# Patient Record
Sex: MALE | Race: WHITE | NOT HISPANIC OR LATINO
[De-identification: names, ages, dates, MRNs, and addresses within clinical notes are randomized per-mention and may not be internally consistent; named-entity substitution may affect disease eponyms.]

---

## 2017-01-03 ENCOUNTER — APPOINTMENT (OUTPATIENT)
Dept: GASTROENTEROLOGY | Facility: HOSPITAL | Age: 57
End: 2017-01-03

## 2020-07-17 ENCOUNTER — OUTPATIENT (OUTPATIENT)
Dept: OUTPATIENT SERVICES | Facility: HOSPITAL | Age: 60
LOS: 1 days | End: 2020-07-17

## 2020-07-17 VITALS
OXYGEN SATURATION: 100 % | HEART RATE: 65 BPM | RESPIRATION RATE: 16 BRPM | SYSTOLIC BLOOD PRESSURE: 161 MMHG | HEIGHT: 68 IN | TEMPERATURE: 98 F | DIASTOLIC BLOOD PRESSURE: 66 MMHG | WEIGHT: 207.9 LBS

## 2020-07-17 DIAGNOSIS — N43.3 HYDROCELE, UNSPECIFIED: ICD-10-CM

## 2020-07-17 DIAGNOSIS — I10 ESSENTIAL (PRIMARY) HYPERTENSION: ICD-10-CM

## 2020-07-17 DIAGNOSIS — N43.2 OTHER HYDROCELE: ICD-10-CM

## 2020-07-17 DIAGNOSIS — E11.9 TYPE 2 DIABETES MELLITUS WITHOUT COMPLICATIONS: ICD-10-CM

## 2020-07-17 RX ORDER — CHLORHEXIDINE GLUCONATE 213 G/1000ML
1 SOLUTION TOPICAL
Qty: 118 | Refills: 0
Start: 2020-07-17 | End: 2020-07-17

## 2020-07-17 NOTE — H&P PST ADULT - NEGATIVE OPHTHALMOLOGIC SYMPTOMS
no indicators present
no blurred vision R/no blurred vision L/no loss of vision L/no loss of vision R

## 2020-07-17 NOTE — H&P PST ADULT - NSICDXPASTMEDICALHX_GEN_ALL_CORE_FT
PAST MEDICAL HISTORY:  Diabetes mellitus     Gastritis     Hyperlipidemia     Hypertension PAST MEDICAL HISTORY:  Diabetes mellitus     Gastritis     Hyperlipidemia     Hypertension     NIKKI (obstructive sleep apnea)

## 2020-07-17 NOTE — H&P PST ADULT - PAIN SCALE PREFERRED, PROFILE
direct patient care (not related to procedure)/additional history taking/interpretation of diagnostic studies/documentation/consultation with other physicians/consult w/ pt's family directly relating to pts condition/telephone consultation with the patient's family
none

## 2020-07-17 NOTE — H&P PST ADULT - NSICDXPROBLEM_GEN_ALL_CORE_FT
PROBLEM DIAGNOSES  Problem: Hydrocele  Assessment and Plan: Preoperative instructions (see paper chart) given to patient with understanding verbalized via telephone with assist of [employer]  services ID# [id number] for scheduled left hydrocelectomy. Patient instructions to include picking up e-script rx for dynahex-4 soap from patient designated pharmacy and using it as instructed.    Problem: Diabetes mellitus  Assessment and Plan: Patient instructed via telephone with assist of [employer]  services ID# [id number] to skip hypoglycemics the morning of surgery. Patient verbalized understanding     Problem: Hypertension  Assessment and Plan: Patient instructed via telephone with assist of [employer]  services ID# [id number] to take Enalapril 10mg with a sip of water the morning of surgery. Patient verbalized understanding PROBLEM DIAGNOSES  Problem: NIKKI (obstructive sleep apnea)  Assessment and Plan: Perioperative nikki precautions to be maintained.     Problem: Hydrocele  Assessment and Plan: Preoperative instructions (see paper chart) given to patient with understanding verbalized via telephone with assist of [employer]  services ID# [id number] for scheduled left hydrocelectomy. Patient instructions to include picking up e-script rx for dynahex-4 soap from patient designated pharmacy and using it as instructed.    Problem: Diabetes mellitus  Assessment and Plan: Patient instructed via telephone with assist of [employer]  services ID# [id number] to skip hypoglycemics the morning of surgery. Patient verbalized understanding     Problem: Hypertension  Assessment and Plan: Patient instructed via telephone with assist of [employer]  services ID# [id number] to take Enalapril 10mg with a sip of water the morning of surgery. Patient verbalized understanding

## 2020-07-17 NOTE — H&P PST ADULT - NS PRO AD NO ADVANCE DIRECTIVE
Patient Education   Bronchitis  Otitis media aguda con infecciÃ³n (niÃ±o)    Richardson hijo tiene tacos infecciÃ³n del oÃ­do (otitis media aguda) causada por bacterias o un hongo. El oÃ­do medio es el espacio que se encuentra detrÃ¡s del [place]. La trompa de Josep conecta el oÃ­do con el pasaje nasal. Las trompas de Josep ayudan a drenar el lÃ­quido de [place]. TambiÃ©n mantienen el SEAT 4a [employer] presiÃ³n dentro de los oÃ­dos y fuera de los oÃ­dos. Estas trompas son mÃ¡s cortas y estÃ¡n ubicadas de manera mÃ¡s horizontal en los niÃ±os, por eso, es mÃ¡s probable que se bloqueen. Un bloqueo hace que se acumulen lÃ­quido y presiÃ³n en el oÃ­do medio. En mason lÃ­quido, pueden crecer bacterias u hongos y provocar tacos infecciÃ³n de oÃ­do. Esta infecciÃ³n suele conocerse murphy âdolor de oÃ­doâ. Richardson principal sÃ­ntoma es el dolor en el oÃ­do. Otros sÃ­ntomas pueden incluir tirarse de la oreja, estar mÃ¡s molesto que lo habitual, tener menos apetito, vÃ³glendy o diarrea. TambiÃ©n puede que richardson hijo tenga dificultades para oÃ­r carlos. Es posible que richardson hijo haya tenido jodi tacos infecciÃ³n respiratoria. Tacos infecciÃ³n de oÃ­do puede desaparecer por sÃ­ chavez. O puede que richardson hijo necesite sonia medicamentos. Eleazar Real que se vaya la infecciÃ³n, es posible que richardson hijo siga teniendo lÃ­quido en el oÃ­do medio, el cual puede tardar semanas o meses en desaparecer. Gemma tasha perÃ­odo, es posible que richardson hijo tenga tacos pÃ©rdida temporal de la audiciÃ³n, hodan el carlee de los sÃ­ntomas del dolor de oÃ­do deberÃ­an desaparecer. Cuidados en richardson casa  Siga estos consejos para cuidar de richardson hijo en richardson casa:  Â· El proveedor de atenciÃ³n mÃ©dica probablemente le recetarÃ¡ medicamentos para aliviar el dolor. TambiÃ©n es posible que le recete antibiÃ³ticos o antifÃºngicos para tratar la infecciÃ³n. Pueden ser medicamentos lÃ­quidos que el niÃ±o deberÃ¡ sonia por la boca. O puede que conner gotas para colocarle en los oÃ­dos.  Siga las instrucciones del proveedor al darle estos medicamentos a richardson hijo.  Â· Dado que las infecciones de oÃ­do pueden desaparecer por sÃ­ solas, es posible que el proveedor sugiera esperar algunos dÃ­as antes de darle medicamentos para la infecciÃ³n a richardson hijo. Â· Para aliviar el dolor, rody que richardson hijo descanse sentado en posiciÃ³n recta. Puede colocarle compresas calientes o frÃ­as contra la oreja para ayudar a calmar el dolor. Â· Mantenga el oÃ­do [address] que richardson hijo use tacos gorra de baÃ±o al ducharse o baÃ±arse. Â· Evite fumar cerca de richardson hijo, ya que el humo de segunda mano aumenta los riesgos de tener infecciones de [employer]. Â· AsegÃºrese de que richardson hijo reciba todas las vacunas que corresponda. Â· Cuando le dÃ© el biberÃ³n, richardson bebÃ© no debe estar [place]. (Esta posiciÃ³n puede causar infecciÃ³n del oÃ­do medio porque permite que la [place] pase hacia las trompas de [place]). Â· Si estÃ¡ amamantando a richardson bebÃ©, siga haciÃ©ndolo hasta que richardson hijo tenga entre seis y [address] [facility]. Para aplicar las gotas en los oÃ­dos:  1. Coloque el frasco en [place] si guarda el medicamento en el refrigerador. Las gotas frÃ­as en el oÃ­do causan molestias. 2. Rody que el niÃ±o se acueste sobre tacos superficie plana. Suavemente, sostenga la delvin del niÃ±o hacia un lado. 3. Quite toda secreciÃ³n que pudiese tener el oÃ­do con un paÃ±uelo de papel o un hisopo de algodÃ³n limpios. Limpie solo el oÃ­do externo. No coloque el hisopo de algodÃ³n dentro del canal auditivo. 4. Con suavidad, tire del lÃ³bulo de la oreja hacia arriba y [place] atrÃ¡s para enderezar el canal auditivo. 5. Sostenga el gotero a alrededor de un centÃ­metro del canal auditivo para evitar que el gotero se contamine. [address] contra el costado del canal auditivo. 6. Rody que richardson hijo se quede [facility] o radha minutos para que el medicamento pueda entrar en el canal auditivo. Si richardson hijo no tiene dolor, masajÃ©tomasa suavemente el oÃ­do externo, cerca de la abertura.   7. Limpie el exceso de medicamento del oÃ­do externo con Kevin Rily jose de algodÃ³n limpia. Visitas de control  Programe tacos visita de control con el proveedor de atenciÃ³n mÃ©dica de richardson hijo o segÃºn se le haya indicado. Richardson hijo necesitarÃ¡ que vuelvan a revisarle el oÃ­do para asegurarse de que la infecciÃ³n se ha resuelto. Consulte a richardson mÃ©dico para saber cuÃ¡ndo querrÃ­a volver a tangela a richardson hijo. Nota especial para los padres  Si richardson hijo sigue teniendo dolor de oÃ­do, puede que deban colocarle tubos en los oÃ­dos. El proveedor le colocarÃ¡ pequeÃ±os tubos en el tÃ­mpano de richardson hijo para ayudar a impedir que el lÃ­quido siga acumulÃ¡ndose. [place] procedimiento es simple y [place] CuÃ¡ndo debe buscar atenciÃ³n mÃ©dica  A menos que el proveedor de atenciÃ³n mÃ©dica de richardson hijo le haya indicado otra cosa, llame enseguida al proveedor de atenciÃ³n mÃ©dica de richardson hijo si el niÃ±o presenta cualquiera de los siguientes sÃ­ntomas:  Â· Richardson hijo tiene radha meses de edad o menos y tiene tacos temperatura de 100.4Âº F (38Âº C) o mÃ¡s. (Busque atenciÃ³n mÃ©dica de inmediato. La fiebre en un bebÃ© pequeÃ±o puede significar tacos infecciÃ³n peligrosa). Â· Richardson hijo tiene menos de [address] y richardson fiebre de 100.4Â° F (38Â° C) continÃºa por mÃ¡s de un dÃ­a. Â· Richardson hijo tiene [address] o mÃ¡s y tiene fiebre de 100.4Âº F (38Âº C) que continÃºa por mÃ¡s de radha dÃ­as. Â· Richardson hijo, de cualquier edad, tiene fiebre a repeticiÃ³n por encima de los 104Â° F (40Â° C). TambiÃ©n llame inmediatamente al proveedor de atenciÃ³n mÃ©dica de richardson hijo si se presenta cualquiera de las siguientes situaciones:  Â· SÃ­ntomas nuevos, especialmente, inflamaciÃ³n alrededor de la oreja o debilidad en los mÃºsculos de la tg. Â· Dolor muy brian. Â· La infecciÃ³n parece empeorar en lugar de mejorar. Â· Dolor en el alli. Â· Richardson hijo se ve muy enfermo (no actÃºa murphy siempre). Â· Fiebre o dolor que no mejora con antibiÃ³ticos despuÃ©s de 48 horas. Â© [address] The [address] [place] [address] [address] Todos los derechos reservados.  Esta informaciÃ³n no pretende sustituir la atenciÃ³n mÃ©dica profesional. SÃ³lo richardson mÃ©dico puede diagnosticar y tratar un problema de arvind. No

## 2020-07-17 NOTE — H&P PST ADULT - HISTORY OF PRESENT ILLNESS
59year old male with pmhx of Diabetes Mellitus Type 2, gastritis, hypertension and hyperlipidemia communicated to via telephone with assist of [employer]  services ID#[id number] Cristobal for purpose of preoperative assessment/instructions for schedule left hydrocelectomy on 7/21/2020 59year old male with pmhx of Diabetes Mellitus Type 2, gastritis, hypertension, NIKKI and hyperlipidemia communicated to via telephone with assist of [employer]  services ID#[id number] Cristobal for purpose of preoperative assessment/instructions for schedule left hydrocelectomy on 7/21/2020

## 2020-07-17 NOTE — H&P PST ADULT - NEGATIVE RESPIRATORY AND THORAX SYMPTOMS
[Good] : being in good health [Regular Exercise] : regular exercise [Healthy Diet] : a healthy diet [Last Pap Smear ___] : last Papanicolaou cytology done [unfilled] [Last Mammogram ___] : last mammogram done [unfilled] [Postmenopausal] : is postmenopausal [Colonoscopy Within 10 Years] : a colonoscopy within the past ten years [Menarche Age: ____] : age at menarche was [unfilled] [Dyspareunia] : dyspareunia [Post-Menopause, No Sxs] : post-menopausal, currently without symptoms [unknown] : the patient is unsure of the date of her LMP [Sexually Active] : is sexually active [Monogamous] : is monogamous [Male ___] : [unfilled] male [Burning] : no burning [Itching] : no itching [Mass] : no mass [Lesion] : no lesion [Stinging] : no stinging [Soreness] : no soreness [Discharge] : no discharge [Localized Pain] : no localized pain [Mass (___cm)] : no palpable mass [Diffused Pain] : no diffused pain [Nipple Discharge] : no nipple discharge [Skin Color Change] : no skin color change [Hot Flashes] : no hot flashes [Night Sweats] : no night sweats [Vaginal Itching] : no vaginal itching [Mood Changes] : no mood changes no wheezing/no dyspnea/no hemoptysis/no pleuritic chest pain/no cough

## 2020-07-17 NOTE — H&P PST ADULT - RX
CPAP machine was prescribed but  patient states he declined CPAP machine was prescribed but patient states he declined

## 2020-07-18 ENCOUNTER — APPOINTMENT (OUTPATIENT)
Dept: DISASTER EMERGENCY | Facility: CLINIC | Age: 60
End: 2020-07-18

## 2020-07-18 DIAGNOSIS — Z01.818 ENCOUNTER FOR OTHER PREPROCEDURAL EXAMINATION: ICD-10-CM

## 2020-07-19 LAB — SARS-COV-2 N GENE NPH QL NAA+PROBE: NOT DETECTED

## 2020-07-20 ENCOUNTER — TRANSCRIPTION ENCOUNTER (OUTPATIENT)
Age: 60
End: 2020-07-20

## 2020-07-21 ENCOUNTER — RESULT REVIEW (OUTPATIENT)
Age: 60
End: 2020-07-21

## 2020-07-21 ENCOUNTER — OUTPATIENT (OUTPATIENT)
Dept: OUTPATIENT SERVICES | Facility: HOSPITAL | Age: 60
LOS: 1 days | End: 2020-07-21
Payer: COMMERCIAL

## 2020-07-21 VITALS
TEMPERATURE: 98 F | RESPIRATION RATE: 16 BRPM | OXYGEN SATURATION: 100 % | DIASTOLIC BLOOD PRESSURE: 66 MMHG | SYSTOLIC BLOOD PRESSURE: 161 MMHG | HEART RATE: 65 BPM

## 2020-07-21 VITALS
HEART RATE: 62 BPM | OXYGEN SATURATION: 100 % | SYSTOLIC BLOOD PRESSURE: 152 MMHG | TEMPERATURE: 99 F | RESPIRATION RATE: 15 BRPM

## 2020-07-21 DIAGNOSIS — N43.3 HYDROCELE, UNSPECIFIED: ICD-10-CM

## 2020-07-21 DIAGNOSIS — G47.33 OBSTRUCTIVE SLEEP APNEA (ADULT) (PEDIATRIC): ICD-10-CM

## 2020-07-21 LAB
GLUCOSE BLDC GLUCOMTR-MCNC: 163 MG/DL — HIGH (ref 70–99)
GLUCOSE BLDC GLUCOMTR-MCNC: 216 MG/DL — HIGH (ref 70–99)

## 2020-07-21 PROCEDURE — 55040 REMOVAL OF HYDROCELE: CPT

## 2020-07-21 PROCEDURE — 82962 GLUCOSE BLOOD TEST: CPT

## 2020-07-21 PROCEDURE — 88302 TISSUE EXAM BY PATHOLOGIST: CPT

## 2020-07-21 PROCEDURE — 55500 REMOVAL OF HYDROCELE: CPT | Mod: AS

## 2020-07-21 PROCEDURE — 88302 TISSUE EXAM BY PATHOLOGIST: CPT | Mod: 26

## 2020-07-21 RX ORDER — CIPROFLOXACIN LACTATE 400MG/40ML
1 VIAL (ML) INTRAVENOUS
Qty: 6 | Refills: 0
Start: 2020-07-21 | End: 2020-07-23

## 2020-07-21 RX ORDER — ACETAMINOPHEN 500 MG
1000 TABLET ORAL ONCE
Refills: 0 | Status: DISCONTINUED | OUTPATIENT
Start: 2020-07-21 | End: 2020-07-29

## 2020-07-21 RX ORDER — SODIUM CHLORIDE 9 MG/ML
3 INJECTION INTRAMUSCULAR; INTRAVENOUS; SUBCUTANEOUS EVERY 8 HOURS
Refills: 0 | Status: DISCONTINUED | OUTPATIENT
Start: 2020-07-21 | End: 2020-07-21

## 2020-07-21 NOTE — ASU DISCHARGE PLAN (ADULT/PEDIATRIC) - CARE PROVIDER_API CALL
Stiven Mcduffie  UROLOGY  [address] [place] [address]  Phone: [phone]  Fax: [phone]  Follow Up Time:

## 2020-07-21 NOTE — ASU PATIENT PROFILE, ADULT - NSALCOHOLTYPE_GEN__A_CORE_SD
Group Topic: BH Check-in/Symptom Rating    Date: 3/12/2020  Start Time: 0830  End Time: 0900  Facilitators: Brianna Theodore LPC    Focus: Symptoms and Goals  Number in attendance: 7    The purpose was for pt to assess current symptoms and set a goal for the day.    Pt did not attend group.    Brianna Theodore MS, LPC-IT       liquor/only during holidays

## 2020-07-21 NOTE — ASU DISCHARGE PLAN (ADULT/PEDIATRIC) - PAIN MANAGEMENT
Prescriptions electronically submitted to pharmacy from [place]/Take over the counter pain medication

## 2020-07-21 NOTE — ASU DISCHARGE PLAN (ADULT/PEDIATRIC) - ASU DC SPECIAL INSTRUCTIONSFT
Please follow-up with Dr. Mcduffie on Thursday 7/23/20 to remove your penrose drain. Please take your prescribed antibiotics as directed. You may take over the counter Tylenol or Motrin for your pain in addition to your prescribed narcotic pain medication.

## 2020-11-02 NOTE — ASU PREOP CHECKLIST - DNR CLARIFICATION FORM COMPLETED

## 2020-11-25 ENCOUNTER — INPATIENT (INPATIENT)
Facility: HOSPITAL | Age: 60
LOS: 7 days | Discharge: HOME | DRG: 418 | End: 2020-12-03
Attending: SPECIALIST | Admitting: SPECIALIST
Payer: COMMERCIAL

## 2020-11-25 VITALS
WEIGHT: 210.1 LBS | SYSTOLIC BLOOD PRESSURE: 176 MMHG | RESPIRATION RATE: 16 BRPM | DIASTOLIC BLOOD PRESSURE: 76 MMHG | OXYGEN SATURATION: 97 % | HEART RATE: 78 BPM | TEMPERATURE: 99 F | HEIGHT: 68 IN

## 2020-11-25 DIAGNOSIS — E11.9 TYPE 2 DIABETES MELLITUS WITHOUT COMPLICATIONS: ICD-10-CM

## 2020-11-25 DIAGNOSIS — I10 ESSENTIAL (PRIMARY) HYPERTENSION: ICD-10-CM

## 2020-11-25 DIAGNOSIS — Z29.9 ENCOUNTER FOR PROPHYLACTIC MEASURES, UNSPECIFIED: ICD-10-CM

## 2020-11-25 DIAGNOSIS — K85.90 ACUTE PANCREATITIS WITHOUT NECROSIS OR INFECTION, UNSPECIFIED: ICD-10-CM

## 2020-11-25 DIAGNOSIS — E78.5 HYPERLIPIDEMIA, UNSPECIFIED: ICD-10-CM

## 2020-11-25 DIAGNOSIS — N28.1 CYST OF KIDNEY, ACQUIRED: ICD-10-CM

## 2020-11-25 DIAGNOSIS — D72.829 ELEVATED WHITE BLOOD CELL COUNT, UNSPECIFIED: ICD-10-CM

## 2020-11-25 PROBLEM — G47.33 OBSTRUCTIVE SLEEP APNEA (ADULT) (PEDIATRIC): Chronic | Status: ACTIVE | Noted: 2020-07-21

## 2020-11-25 PROBLEM — K29.70 GASTRITIS, UNSPECIFIED, WITHOUT BLEEDING: Chronic | Status: ACTIVE | Noted: 2020-07-17

## 2020-11-25 LAB
24R-OH-CALCIDIOL SERPL-MCNC: 39.4 NG/ML — SIGNIFICANT CHANGE UP (ref 30–80)
A1C WITH ESTIMATED AVERAGE GLUCOSE RESULT: 9.2 % — HIGH (ref 4–5.6)
ALBUMIN SERPL ELPH-MCNC: 3.5 G/DL — SIGNIFICANT CHANGE UP (ref 3.5–5)
ALBUMIN SERPL ELPH-MCNC: 3.5 G/DL — SIGNIFICANT CHANGE UP (ref 3.5–5)
ALP SERPL-CCNC: 67 U/L — SIGNIFICANT CHANGE UP (ref 40–120)
ALP SERPL-CCNC: 68 U/L — SIGNIFICANT CHANGE UP (ref 40–120)
ALT FLD-CCNC: 19 U/L DA — SIGNIFICANT CHANGE UP (ref 10–60)
ALT FLD-CCNC: 19 U/L DA — SIGNIFICANT CHANGE UP (ref 10–60)
AMMONIA BLD-MCNC: 26 UMOL/L — SIGNIFICANT CHANGE UP (ref 11–32)
AMPHET UR-MCNC: NEGATIVE — SIGNIFICANT CHANGE UP
AMYLASE P1 CFR SERPL: 529 U/L — HIGH (ref 25–115)
ANION GAP SERPL CALC-SCNC: 14 MMOL/L — SIGNIFICANT CHANGE UP (ref 5–17)
ANION GAP SERPL CALC-SCNC: 15 MMOL/L — SIGNIFICANT CHANGE UP (ref 5–17)
APPEARANCE UR: CLEAR — SIGNIFICANT CHANGE UP
AST SERPL-CCNC: 17 U/L — SIGNIFICANT CHANGE UP (ref 10–40)
AST SERPL-CCNC: 18 U/L — SIGNIFICANT CHANGE UP (ref 10–40)
BARBITURATES UR SCN-MCNC: NEGATIVE — SIGNIFICANT CHANGE UP
BASOPHILS # BLD AUTO: 0.03 K/UL — SIGNIFICANT CHANGE UP (ref 0–0.2)
BASOPHILS # BLD AUTO: 0.03 K/UL — SIGNIFICANT CHANGE UP (ref 0–0.2)
BASOPHILS NFR BLD AUTO: 0.1 % — SIGNIFICANT CHANGE UP (ref 0–2)
BASOPHILS NFR BLD AUTO: 0.2 % — SIGNIFICANT CHANGE UP (ref 0–2)
BENZODIAZ UR-MCNC: NEGATIVE — SIGNIFICANT CHANGE UP
BILIRUB SERPL-MCNC: 0.7 MG/DL — SIGNIFICANT CHANGE UP (ref 0.2–1.2)
BILIRUB SERPL-MCNC: 0.7 MG/DL — SIGNIFICANT CHANGE UP (ref 0.2–1.2)
BILIRUB UR-MCNC: NEGATIVE — SIGNIFICANT CHANGE UP
BUN SERPL-MCNC: 25 MG/DL — HIGH (ref 7–18)
BUN SERPL-MCNC: 30 MG/DL — HIGH (ref 7–18)
CA-I BLD-SCNC: 1.25 MMOL/L — SIGNIFICANT CHANGE UP (ref 1.12–1.3)
CALCIUM SERPL-MCNC: 8.5 MG/DL — SIGNIFICANT CHANGE UP (ref 8.4–10.5)
CALCIUM SERPL-MCNC: 8.9 MG/DL — SIGNIFICANT CHANGE UP (ref 8.4–10.5)
CHLORIDE SERPL-SCNC: 100 MMOL/L — SIGNIFICANT CHANGE UP (ref 96–108)
CHLORIDE SERPL-SCNC: 106 MMOL/L — SIGNIFICANT CHANGE UP (ref 96–108)
CHOLEST SERPL-MCNC: 139 MG/DL — SIGNIFICANT CHANGE UP
CO2 SERPL-SCNC: 20 MMOL/L — LOW (ref 22–31)
CO2 SERPL-SCNC: 21 MMOL/L — LOW (ref 22–31)
COCAINE METAB.OTHER UR-MCNC: NEGATIVE — SIGNIFICANT CHANGE UP
COLOR SPEC: YELLOW — SIGNIFICANT CHANGE UP
CREAT SERPL-MCNC: 0.99 MG/DL — SIGNIFICANT CHANGE UP (ref 0.5–1.3)
CREAT SERPL-MCNC: 1.11 MG/DL — SIGNIFICANT CHANGE UP (ref 0.5–1.3)
CRP SERPL-MCNC: 4.68 MG/DL — HIGH (ref 0–0.4)
DIFF PNL FLD: NEGATIVE — SIGNIFICANT CHANGE UP
EOSINOPHIL # BLD AUTO: 0 K/UL — SIGNIFICANT CHANGE UP (ref 0–0.5)
EOSINOPHIL # BLD AUTO: 0.2 K/UL — SIGNIFICANT CHANGE UP (ref 0–0.5)
EOSINOPHIL NFR BLD AUTO: 0 % — SIGNIFICANT CHANGE UP (ref 0–6)
EOSINOPHIL NFR BLD AUTO: 1.1 % — SIGNIFICANT CHANGE UP (ref 0–6)
ERYTHROCYTE [SEDIMENTATION RATE] IN BLOOD: 6 MM/HR — SIGNIFICANT CHANGE UP (ref 0–20)
ESTIMATED AVERAGE GLUCOSE: 217 MG/DL — HIGH (ref 68–114)
ETHANOL SERPL-MCNC: 5 MG/DL — SIGNIFICANT CHANGE UP (ref 0–10)
ETHANOL SERPL-MCNC: <3 MG/DL — SIGNIFICANT CHANGE UP (ref 0–10)
FERRITIN SERPL-MCNC: 197 NG/ML — SIGNIFICANT CHANGE UP (ref 30–400)
FOLATE SERPL-MCNC: 15.1 NG/ML — SIGNIFICANT CHANGE UP
GGT SERPL-CCNC: 17 U/L — SIGNIFICANT CHANGE UP (ref 9–50)
GLUCOSE BLDC GLUCOMTR-MCNC: 148 MG/DL — HIGH (ref 70–99)
GLUCOSE BLDC GLUCOMTR-MCNC: 202 MG/DL — HIGH (ref 70–99)
GLUCOSE BLDC GLUCOMTR-MCNC: 217 MG/DL — HIGH (ref 70–99)
GLUCOSE BLDC GLUCOMTR-MCNC: 224 MG/DL — HIGH (ref 70–99)
GLUCOSE SERPL-MCNC: 235 MG/DL — HIGH (ref 70–99)
GLUCOSE SERPL-MCNC: 339 MG/DL — HIGH (ref 70–99)
GLUCOSE UR QL: 1000 MG/DL
HCT VFR BLD CALC: 50.8 % — HIGH (ref 39–50)
HCT VFR BLD CALC: 51.8 % — HIGH (ref 39–50)
HDLC SERPL-MCNC: 48 MG/DL — SIGNIFICANT CHANGE UP
HGB BLD-MCNC: 16.1 G/DL — SIGNIFICANT CHANGE UP (ref 13–17)
HGB BLD-MCNC: 16.7 G/DL — SIGNIFICANT CHANGE UP (ref 13–17)
IMM GRANULOCYTES NFR BLD AUTO: 0.3 % — SIGNIFICANT CHANGE UP (ref 0–1.5)
IMM GRANULOCYTES NFR BLD AUTO: 0.5 % — SIGNIFICANT CHANGE UP (ref 0–1.5)
KETONES UR-MCNC: ABNORMAL
LDH SERPL L TO P-CCNC: 215 U/L — SIGNIFICANT CHANGE UP (ref 120–225)
LEUKOCYTE ESTERASE UR-ACNC: NEGATIVE — SIGNIFICANT CHANGE UP
LIDOCAIN IGE QN: 6435 U/L — HIGH (ref 73–393)
LIDOCAIN IGE QN: HIGH U/L (ref 73–393)
LIPID PNL WITH DIRECT LDL SERPL: 77 MG/DL — SIGNIFICANT CHANGE UP
LYMPHOCYTES # BLD AUTO: 0.74 K/UL — LOW (ref 1–3.3)
LYMPHOCYTES # BLD AUTO: 0.86 K/UL — LOW (ref 1–3.3)
LYMPHOCYTES # BLD AUTO: 3.7 % — LOW (ref 13–44)
LYMPHOCYTES # BLD AUTO: 4.9 % — LOW (ref 13–44)
MAGNESIUM SERPL-MCNC: 2.3 MG/DL — SIGNIFICANT CHANGE UP (ref 1.6–2.6)
MCHC RBC-ENTMCNC: 28 PG — SIGNIFICANT CHANGE UP (ref 27–34)
MCHC RBC-ENTMCNC: 28.6 PG — SIGNIFICANT CHANGE UP (ref 27–34)
MCHC RBC-ENTMCNC: 31.7 GM/DL — LOW (ref 32–36)
MCHC RBC-ENTMCNC: 32.2 GM/DL — SIGNIFICANT CHANGE UP (ref 32–36)
MCV RBC AUTO: 88.3 FL — SIGNIFICANT CHANGE UP (ref 80–100)
MCV RBC AUTO: 88.9 FL — SIGNIFICANT CHANGE UP (ref 80–100)
METHADONE UR-MCNC: NEGATIVE — SIGNIFICANT CHANGE UP
MONOCYTES # BLD AUTO: 0.65 K/UL — SIGNIFICANT CHANGE UP (ref 0–0.9)
MONOCYTES # BLD AUTO: 1.33 K/UL — HIGH (ref 0–0.9)
MONOCYTES NFR BLD AUTO: 3.7 % — SIGNIFICANT CHANGE UP (ref 2–14)
MONOCYTES NFR BLD AUTO: 6.6 % — SIGNIFICANT CHANGE UP (ref 2–14)
NEUTROPHILS # BLD AUTO: 15.6 K/UL — HIGH (ref 1.8–7.4)
NEUTROPHILS # BLD AUTO: 18.04 K/UL — HIGH (ref 1.8–7.4)
NEUTROPHILS NFR BLD AUTO: 89.3 % — HIGH (ref 43–77)
NEUTROPHILS NFR BLD AUTO: 89.6 % — HIGH (ref 43–77)
NITRITE UR-MCNC: NEGATIVE — SIGNIFICANT CHANGE UP
NON HDL CHOLESTEROL: 91 MG/DL — SIGNIFICANT CHANGE UP
NRBC # BLD: 0 /100 WBCS — SIGNIFICANT CHANGE UP (ref 0–0)
NRBC # BLD: 0 /100 WBCS — SIGNIFICANT CHANGE UP (ref 0–0)
OPIATES UR-MCNC: POSITIVE
PCP SPEC-MCNC: SIGNIFICANT CHANGE UP
PCP UR-MCNC: NEGATIVE — SIGNIFICANT CHANGE UP
PH UR: 5 — SIGNIFICANT CHANGE UP (ref 5–8)
PHOSPHATE SERPL-MCNC: 4 MG/DL — SIGNIFICANT CHANGE UP (ref 2.5–4.5)
PLATELET # BLD AUTO: 224 K/UL — SIGNIFICANT CHANGE UP (ref 150–400)
PLATELET # BLD AUTO: 241 K/UL — SIGNIFICANT CHANGE UP (ref 150–400)
POTASSIUM SERPL-MCNC: 4.2 MMOL/L — SIGNIFICANT CHANGE UP (ref 3.5–5.3)
POTASSIUM SERPL-MCNC: 4.5 MMOL/L — SIGNIFICANT CHANGE UP (ref 3.5–5.3)
POTASSIUM SERPL-SCNC: 4.2 MMOL/L — SIGNIFICANT CHANGE UP (ref 3.5–5.3)
POTASSIUM SERPL-SCNC: 4.5 MMOL/L — SIGNIFICANT CHANGE UP (ref 3.5–5.3)
PROCALCITONIN SERPL-MCNC: 0.27 NG/ML — HIGH (ref 0.02–0.1)
PROT SERPL-MCNC: 7.2 G/DL — SIGNIFICANT CHANGE UP (ref 6–8.3)
PROT SERPL-MCNC: 7.4 G/DL — SIGNIFICANT CHANGE UP (ref 6–8.3)
PROT UR-MCNC: NEGATIVE — SIGNIFICANT CHANGE UP
RBC # BLD: 5.75 M/UL — SIGNIFICANT CHANGE UP (ref 4.2–5.8)
RBC # BLD: 5.83 M/UL — HIGH (ref 4.2–5.8)
RBC # FLD: 13.4 % — SIGNIFICANT CHANGE UP (ref 10.3–14.5)
RBC # FLD: 13.7 % — SIGNIFICANT CHANGE UP (ref 10.3–14.5)
SARS-COV-2 RNA SPEC QL NAA+PROBE: SIGNIFICANT CHANGE UP
SODIUM SERPL-SCNC: 136 MMOL/L — SIGNIFICANT CHANGE UP (ref 135–145)
SODIUM SERPL-SCNC: 140 MMOL/L — SIGNIFICANT CHANGE UP (ref 135–145)
SP GR SPEC: 1.01 — SIGNIFICANT CHANGE UP (ref 1.01–1.02)
THC UR QL: NEGATIVE — SIGNIFICANT CHANGE UP
TRIGL SERPL-MCNC: 71 MG/DL — SIGNIFICANT CHANGE UP
TRIGL SERPL-MCNC: 95 MG/DL — SIGNIFICANT CHANGE UP
TROPONIN I SERPL-MCNC: <0.015 NG/ML — SIGNIFICANT CHANGE UP (ref 0–0.04)
TSH SERPL-MCNC: 0.82 UU/ML — SIGNIFICANT CHANGE UP (ref 0.34–4.82)
URATE SERPL-MCNC: 6.1 MG/DL — SIGNIFICANT CHANGE UP (ref 3.4–8.8)
UROBILINOGEN FLD QL: NEGATIVE — SIGNIFICANT CHANGE UP
VIT B12 SERPL-MCNC: 704 PG/ML — SIGNIFICANT CHANGE UP (ref 232–1245)
WBC # BLD: 17.43 K/UL — HIGH (ref 3.8–10.5)
WBC # BLD: 20.21 K/UL — HIGH (ref 3.8–10.5)
WBC # FLD AUTO: 17.43 K/UL — HIGH (ref 3.8–10.5)
WBC # FLD AUTO: 20.21 K/UL — HIGH (ref 3.8–10.5)

## 2020-11-25 PROCEDURE — 71045 X-RAY EXAM CHEST 1 VIEW: CPT | Mod: 26

## 2020-11-25 PROCEDURE — 99222 1ST HOSP IP/OBS MODERATE 55: CPT

## 2020-11-25 PROCEDURE — 76705 ECHO EXAM OF ABDOMEN: CPT | Mod: 26

## 2020-11-25 PROCEDURE — 74177 CT ABD & PELVIS W/CONTRAST: CPT | Mod: 26

## 2020-11-25 PROCEDURE — 99285 EMERGENCY DEPT VISIT HI MDM: CPT

## 2020-11-25 PROCEDURE — 47563 LAPARO CHOLECYSTECTOMY/GRAPH: CPT | Mod: AS

## 2020-11-25 RX ORDER — GABAPENTIN 400 MG/1
100 CAPSULE ORAL THREE TIMES A DAY
Refills: 0 | Status: DISCONTINUED | OUTPATIENT
Start: 2020-11-25 | End: 2020-12-02

## 2020-11-25 RX ORDER — GABAPENTIN 400 MG/1
0 CAPSULE ORAL
Qty: 0 | Refills: 0 | DISCHARGE

## 2020-11-25 RX ORDER — MORPHINE SULFATE 50 MG/1
4 CAPSULE, EXTENDED RELEASE ORAL ONCE
Refills: 0 | Status: DISCONTINUED | OUTPATIENT
Start: 2020-11-25 | End: 2020-11-25

## 2020-11-25 RX ORDER — ROSUVASTATIN CALCIUM 5 MG/1
1 TABLET ORAL
Qty: 0 | Refills: 0 | DISCHARGE

## 2020-11-25 RX ORDER — GLUCAGON INJECTION, SOLUTION 0.5 MG/.1ML
1 INJECTION, SOLUTION SUBCUTANEOUS ONCE
Refills: 0 | Status: DISCONTINUED | OUTPATIENT
Start: 2020-11-25 | End: 2020-12-02

## 2020-11-25 RX ORDER — DESOXIMETASONE 0.05 %
0 CREAM (GRAM) TOPICAL
Qty: 0 | Refills: 4 | DISCHARGE

## 2020-11-25 RX ORDER — ATORVASTATIN CALCIUM 80 MG/1
20 TABLET, FILM COATED ORAL AT BEDTIME
Refills: 0 | Status: DISCONTINUED | OUTPATIENT
Start: 2020-11-25 | End: 2020-12-02

## 2020-11-25 RX ORDER — ONDANSETRON 8 MG/1
4 TABLET, FILM COATED ORAL EVERY 4 HOURS
Refills: 0 | Status: DISCONTINUED | OUTPATIENT
Start: 2020-11-25 | End: 2020-12-02

## 2020-11-25 RX ORDER — DAPAGLIFLOZIN 10 MG/1
0 TABLET, FILM COATED ORAL
Qty: 0 | Refills: 2 | DISCHARGE

## 2020-11-25 RX ORDER — MORPHINE SULFATE 50 MG/1
1 CAPSULE, EXTENDED RELEASE ORAL EVERY 4 HOURS
Refills: 0 | Status: DISCONTINUED | OUTPATIENT
Start: 2020-11-25 | End: 2020-11-30

## 2020-11-25 RX ORDER — ROSUVASTATIN CALCIUM 5 MG/1
0 TABLET ORAL
Qty: 0 | Refills: 3 | DISCHARGE

## 2020-11-25 RX ORDER — INSULIN LISPRO 100/ML
VIAL (ML) SUBCUTANEOUS EVERY 6 HOURS
Refills: 0 | Status: DISCONTINUED | OUTPATIENT
Start: 2020-11-25 | End: 2020-11-29

## 2020-11-25 RX ORDER — SODIUM CHLORIDE 9 MG/ML
1000 INJECTION, SOLUTION INTRAVENOUS
Refills: 0 | Status: DISCONTINUED | OUTPATIENT
Start: 2020-11-25 | End: 2020-11-26

## 2020-11-25 RX ORDER — PANTOPRAZOLE SODIUM 20 MG/1
40 TABLET, DELAYED RELEASE ORAL DAILY
Refills: 0 | Status: DISCONTINUED | OUTPATIENT
Start: 2020-11-25 | End: 2020-12-02

## 2020-11-25 RX ORDER — SODIUM CHLORIDE 9 MG/ML
1000 INJECTION INTRAMUSCULAR; INTRAVENOUS; SUBCUTANEOUS ONCE
Refills: 0 | Status: COMPLETED | OUTPATIENT
Start: 2020-11-25 | End: 2020-11-25

## 2020-11-25 RX ORDER — DEXTROSE 50 % IN WATER 50 %
15 SYRINGE (ML) INTRAVENOUS ONCE
Refills: 0 | Status: DISCONTINUED | OUTPATIENT
Start: 2020-11-25 | End: 2020-12-02

## 2020-11-25 RX ORDER — LIRAGLUTIDE 6 MG/ML
0 INJECTION SUBCUTANEOUS
Qty: 0 | Refills: 0 | DISCHARGE

## 2020-11-25 RX ORDER — ONDANSETRON 8 MG/1
4 TABLET, FILM COATED ORAL ONCE
Refills: 0 | Status: COMPLETED | OUTPATIENT
Start: 2020-11-25 | End: 2020-11-25

## 2020-11-25 RX ORDER — PIPERACILLIN AND TAZOBACTAM 4; .5 G/20ML; G/20ML
3.38 INJECTION, POWDER, LYOPHILIZED, FOR SOLUTION INTRAVENOUS ONCE
Refills: 0 | Status: COMPLETED | OUTPATIENT
Start: 2020-11-25 | End: 2020-11-25

## 2020-11-25 RX ORDER — HYDRALAZINE HCL 50 MG
5 TABLET ORAL EVERY 4 HOURS
Refills: 0 | Status: DISCONTINUED | OUTPATIENT
Start: 2020-11-25 | End: 2020-11-26

## 2020-11-25 RX ORDER — MORPHINE SULFATE 50 MG/1
1 CAPSULE, EXTENDED RELEASE ORAL ONCE
Refills: 0 | Status: DISCONTINUED | OUTPATIENT
Start: 2020-11-25 | End: 2020-11-25

## 2020-11-25 RX ORDER — ENOXAPARIN SODIUM 100 MG/ML
40 INJECTION SUBCUTANEOUS DAILY
Refills: 0 | Status: DISCONTINUED | OUTPATIENT
Start: 2020-11-25 | End: 2020-12-02

## 2020-11-25 RX ORDER — HYDROMORPHONE HYDROCHLORIDE 2 MG/ML
0.5 INJECTION INTRAMUSCULAR; INTRAVENOUS; SUBCUTANEOUS EVERY 6 HOURS
Refills: 0 | Status: DISCONTINUED | OUTPATIENT
Start: 2020-11-25 | End: 2020-11-27

## 2020-11-25 RX ORDER — METFORMIN HYDROCHLORIDE 850 MG/1
1 TABLET ORAL
Qty: 0 | Refills: 0 | DISCHARGE

## 2020-11-25 RX ADMIN — HYDROMORPHONE HYDROCHLORIDE 0.5 MILLIGRAM(S): 2 INJECTION INTRAMUSCULAR; INTRAVENOUS; SUBCUTANEOUS at 11:49

## 2020-11-25 RX ADMIN — GABAPENTIN 100 MILLIGRAM(S): 400 CAPSULE ORAL at 22:23

## 2020-11-25 RX ADMIN — Medication 2: at 11:17

## 2020-11-25 RX ADMIN — ONDANSETRON 4 MILLIGRAM(S): 8 TABLET, FILM COATED ORAL at 08:29

## 2020-11-25 RX ADMIN — MORPHINE SULFATE 1 MILLIGRAM(S): 50 CAPSULE, EXTENDED RELEASE ORAL at 08:32

## 2020-11-25 RX ADMIN — PANTOPRAZOLE SODIUM 40 MILLIGRAM(S): 20 TABLET, DELAYED RELEASE ORAL at 11:54

## 2020-11-25 RX ADMIN — ONDANSETRON 4 MILLIGRAM(S): 8 TABLET, FILM COATED ORAL at 04:42

## 2020-11-25 RX ADMIN — HYDROMORPHONE HYDROCHLORIDE 0.5 MILLIGRAM(S): 2 INJECTION INTRAMUSCULAR; INTRAVENOUS; SUBCUTANEOUS at 18:00

## 2020-11-25 RX ADMIN — HYDROMORPHONE HYDROCHLORIDE 0.5 MILLIGRAM(S): 2 INJECTION INTRAMUSCULAR; INTRAVENOUS; SUBCUTANEOUS at 11:19

## 2020-11-25 RX ADMIN — MORPHINE SULFATE 4 MILLIGRAM(S): 50 CAPSULE, EXTENDED RELEASE ORAL at 04:42

## 2020-11-25 RX ADMIN — PIPERACILLIN AND TAZOBACTAM 3.38 GRAM(S): 4; .5 INJECTION, POWDER, LYOPHILIZED, FOR SOLUTION INTRAVENOUS at 03:18

## 2020-11-25 RX ADMIN — ENOXAPARIN SODIUM 40 MILLIGRAM(S): 100 INJECTION SUBCUTANEOUS at 11:54

## 2020-11-25 RX ADMIN — SODIUM CHLORIDE 1000 MILLILITER(S): 9 INJECTION INTRAMUSCULAR; INTRAVENOUS; SUBCUTANEOUS at 01:20

## 2020-11-25 RX ADMIN — ATORVASTATIN CALCIUM 20 MILLIGRAM(S): 80 TABLET, FILM COATED ORAL at 22:24

## 2020-11-25 RX ADMIN — Medication 2: at 17:13

## 2020-11-25 RX ADMIN — MORPHINE SULFATE 1 MILLIGRAM(S): 50 CAPSULE, EXTENDED RELEASE ORAL at 23:00

## 2020-11-25 RX ADMIN — MORPHINE SULFATE 4 MILLIGRAM(S): 50 CAPSULE, EXTENDED RELEASE ORAL at 03:48

## 2020-11-25 RX ADMIN — GABAPENTIN 100 MILLIGRAM(S): 400 CAPSULE ORAL at 13:57

## 2020-11-25 RX ADMIN — MORPHINE SULFATE 1 MILLIGRAM(S): 50 CAPSULE, EXTENDED RELEASE ORAL at 10:19

## 2020-11-25 RX ADMIN — SODIUM CHLORIDE 150 MILLILITER(S): 9 INJECTION, SOLUTION INTRAVENOUS at 11:22

## 2020-11-25 RX ADMIN — Medication 2: at 08:29

## 2020-11-25 RX ADMIN — MORPHINE SULFATE 1 MILLIGRAM(S): 50 CAPSULE, EXTENDED RELEASE ORAL at 22:23

## 2020-11-25 RX ADMIN — MORPHINE SULFATE 1 MILLIGRAM(S): 50 CAPSULE, EXTENDED RELEASE ORAL at 20:30

## 2020-11-25 RX ADMIN — PIPERACILLIN AND TAZOBACTAM 200 GRAM(S): 4; .5 INJECTION, POWDER, LYOPHILIZED, FOR SOLUTION INTRAVENOUS at 03:18

## 2020-11-25 RX ADMIN — MORPHINE SULFATE 1 MILLIGRAM(S): 50 CAPSULE, EXTENDED RELEASE ORAL at 19:48

## 2020-11-25 RX ADMIN — MORPHINE SULFATE 4 MILLIGRAM(S): 50 CAPSULE, EXTENDED RELEASE ORAL at 05:29

## 2020-11-25 RX ADMIN — MORPHINE SULFATE 4 MILLIGRAM(S): 50 CAPSULE, EXTENDED RELEASE ORAL at 03:18

## 2020-11-25 RX ADMIN — HYDROMORPHONE HYDROCHLORIDE 0.5 MILLIGRAM(S): 2 INJECTION INTRAMUSCULAR; INTRAVENOUS; SUBCUTANEOUS at 17:12

## 2020-11-25 NOTE — H&P ADULT - HISTORY OF PRESENT ILLNESS
Patient is a 59 year old male [place] Irish speaking from home live with wife walk independantly with a PMH of HTN, HLD, DM, NIKKI, h/o Left Hydrocelectomy on 7/21/2020  who presented with a chief complaint of abdominal pain.  Patient states that beginning at approximately 20:00 on the evening of 11/24, after eating dinner of steak and tomatoes. patient endorsed experiencing a severe 9/10 pain, constant, non-radiating. Pt reports pain to be in epigastric and sometimes in RUQ abdominal pain that come sand goes.  Patient denies ever experiencing symptoms such as this before. Pt deneis any recent alcohol intake, drug or smokng.

## 2020-11-25 NOTE — ED PROVIDER NOTE - CONTEXT
Hpi Title: Evaluation of Skin Lesions How Severe Are Your Spot(S)?: mild Have Your Spot(S) Been Treated In The Past?: has not been treated unknown

## 2020-11-25 NOTE — ED PROVIDER NOTE - CLINICAL SUMMARY MEDICAL DECISION MAKING FREE TEXT BOX
58 y/o male with HTN, HLD, DM, presents with abdominal pain and vomiting. Will obtain labs, CT abdomen, and UA. Given morphine for pain and zofran for nausea. Will reassess. 60 y/o male with HTN, HLD, DM, presents with abdominal pain and vomiting. Will obtain labs, CT abdomen, and UA. Given morphine for pain and zofran for nausea. Will reassess.    labs show wbc 17K-given zosyn, cmp unremarkable, lipase 11K, triglycerides wnl,   CT shows Findings compatible with acute interstitial pancreatitis. No peripancreatic abscess. Mild gallbladder wall edema. Consider correlation with right upper quadrant ultrasound if there is concern for gallbladder disease.  Discussed above with patient using AMT  ID#[id number], patient agrees with plan for admission.

## 2020-11-25 NOTE — H&P ADULT - ASSESSMENT
Patient is a 59 year old male [place] speaking from home live with wife walk independantly with a PMH of HTN, HLD, DM, NIKKI, h/o Left Hydrocelectomy on 7/21/2020  who presented with a chief complaint of abdominal pain. Found  to have elevated lipase and admitted for pancreatitis.       ************PRIMARY TEAM PLEASE CONFIRM MEDS IN AM******

## 2020-11-25 NOTE — CONSULT NOTE ADULT - PROBLEM SELECTOR RECOMMENDATION 9
lipase 11,046  CT Abdomen/Pelvis with IV contrast findings compatible with acute interstitial pancreatitis. No peripancreatic abscess.  Mild gallbladder wall edema. +Steatosis also noted on CT  LFT's WNL lipase 11,046  CT Abdomen/Pelvis with IV contrast findings compatible with acute interstitial pancreatitis. No peripancreatic abscess.  Mild gallbladder wall edema. +Steatosis also noted on CT  LFT's WNL  NPO  IV hydration lipase 11,046  CT Abdomen/Pelvis with IV contrast findings compatible with acute interstitial pancreatitis. No peripancreatic abscess.  Mild gallbladder wall edema. +Steatosis also noted on CT  LFT's WNL  NPO  IV hydration  f/u US  pain management lipase 11,046  CT Abdomen/Pelvis with IV contrast findings compatible with acute interstitial pancreatitis. No peripancreatic abscess.  Mild gallbladder wall edema. +Steatosis also noted on CT  LFT's WNL  NPO  IV hydration  f/u US  pain management  monitor WBC lipase 11,046 - unclear etiology  CT Abdomen/Pelvis with IV contrast findings compatible with acute interstitial pancreatitis. No peripancreatic abscess.  Mild gallbladder wall edema. +Steatosis also noted on CT  LFT's WNL  NPO  IV hydration  f/u US  pain management  WBC trending up may need HIDA unclear etiology  lipase downtrending   CT Abdomen/Pelvis with IV contrast findings compatible with acute interstitial pancreatitis. No peripancreatic abscess.  Mild gallbladder wall edema. +Steatosis also noted on CT  LFT's WNL  NPO - can try clear liquid diet as tolerated  IV hydration  f/u US  pain management  WBC trending up may need HIDA

## 2020-11-25 NOTE — H&P ADULT - ATTENDING COMMENTS
Patient is a 59 year old male with a PMH of HTN, HLD, DM, NIKKI, h/o Left Hydrocelectomy on 7/21/2020 who presented with a chief complaint of abdominal pain.  ([place]  ID: [id number])  Patient states that beginning at approximately 20:00 on the evening of current presentation, he after eating dinner, patient endorses experiencing a severe, constant, non-radiating, epigastric and RUQ abdominal pain described as "colicky" pain.  Patient denies ever experiencing symptoms such as this before.    At time of examination in the ED, patient continued to complain of epigastric and RUQ abdominal pain.  However, patient denied any headache, dizziness, chest pain, palpitations, shortness of breath, nausea/vomiting/diarrhea.    T(C): 36.6 (11-25-20 @ 05:30), Max: 37.1 (11-25-20 @ 00:32)  T(F): 97.9 (11-25-20 @ 05:30), Max: 98.8 (11-25-20 @ 00:32)  HR: 80 (11-25-20 @ 05:30) (78 - 80)  BP: 148/62 (11-25-20 @ 05:30) (148/62 - 176/76)  RR: 15 (11-25-20 @ 05:30) (15 - 16)  SpO2: 95% (11-25-20 @ 05:30) (95% - 97%)  Wt(kg): --    P/E: As above MAR    A/P:    Acute Pancreatitis:  -Lipase= 11,046  -CT Abdomen/Pelvis with IV contrast identified findings compatible with acute interstitial pancreatitis. No peripancreatic abscess.  Mild gallbladder wall edema.  -BISAP Score= 2 (BUN>25, Age>60), Patients with a BISAP Score >0 had an increasing risk of mortality, with mortality increasing significantly with a score of 3 or greater.    -Will admit to the medical floor  -Will need to send Alcohol, Blood as well as UDS  -Will send Amylase, Lipid Panel, GGT, Vitamin D, ESR, CRP with AM labs  -If above studies entirely unremarkable, can potentially consider also sending IgG4, RF, HUGO, antismooth muscle antibodies, though will hold for now  -NPO (except meds), for now (for bowel rest) then should slowly resume PO feedings as tolerated within 24 hours  -Will order Morphine 1mg Q4H PRN  -Will start Lactated Ringer at a rate of 150mL/h (for now)  -Strict I&O's with a goal urine output of 1cc/kg/hour  -Close monitoring and correction of electrolyte derangements (including glucose) as necessary  -Will hold additional antimicrobials, for now, as there is no suspicion of infected pancreatic necrosis  -Vital Signs Q4H    Leukocytosis:  -SIRS Criteria= 1 (WBC>12,000) + no definitive source of infection (?Gallbladder)  -Patient received Zosyn 3.375gm IV Once in the ED.  -Will hold additional antimicrobials, for now  -Though will send ESR, CRP, Procalcitonin  -Will obtain Ultrasound of RUQ (with attention to Gallbladder, CBD)    DM:  -Hemoglobin A1c with AM labs  -Blood Glucose Monitoring ACHS  -Regular Insulin Sliding Scale ACHS  -NPO (except meds), for now (for bowel rest) then should slowly resume PO feedings as tolerated within 24 hours  -Patient should follow-up with Endocrinology as an outpatient after discharge    Right Renal Cysts:  -CT Abdomen/Pelvis with IV contrast identified Right renal cysts as well as too small to characterize bilateral hypodensities.  -Should obtain Bilateral Renal Sonogram  -Patient should follow-up with Urology as an outpatient after discharge    HTN:  -Will resume patient's home medication  -Vital Signs Q4H    HLD:  -Lipid Panel with AM labs  -Will resume patient's home medication    GI/DVT PPx:  -Heparin  -Pepcid

## 2020-11-25 NOTE — H&P ADULT - PROBLEM SELECTOR PLAN 1
pT P/W ABD PAIN AND elevated lipase 11,046, meet 2/3 criterion for acute pancreatitis  -CT Abdomen/Pelvis with IV contrast identified findings compatible with acute interstitial pancreatitis. No peripancreatic abscess.  Mild gallbladder wall edema.  -BISAP Score= 2 (BUN>25, Age>60), Patients with a BISAP Score >0 had an increasing risk of mortality, with mortality increasing significantly with a score of 3 or greater.  -f/u Alcohol, Blood , UDS  -f/u Amylase, Lipid Panel, GGT, Vitamin D, ESR, CRP   -If above studies entirely unremarkable, can potentially consider also sending IgG4, RF, HUGO, antismooth muscle antibodies, though will hold for now  -NPO (except meds), for now (for bowel rest) then should slowly resume PO feedings as tolerated within 24 hours  - Morphine 1mg Q4H PRN for pain managment  -Will start Lactated Ringer at a rate of 150mL/h (for now)  -Strict I&O's with a goal urine output of 1cc/kg/hour  -Close monitoring and correction of electrolyte derangements  -Will hold additional antimicrobials, for now, as there is no suspicion of infected pancreatic necrosis  -Vital Signs monitoring pT P/W ABD PAIN AND elevated lipase 11,046, meet 2/3 criterion for acute pancreatitis  -CT Abdomen/Pelvis with IV contrast identified findings compatible with acute interstitial pancreatitis. No peripancreatic abscess.  Mild gallbladder wall edema.  -BISAP Score= 2 (BUN>25, Age>60), Patients with a BISAP Score >0 had an increasing risk of mortality, with mortality increasing significantly with a score of 3 or greater.  -f/u Alcohol, Blood , UDS  -f/u Amylase, Lipid Panel, GGT, Vitamin D, ESR, CRP   -If above studies entirely unremarkable, can potentially consider also sending IgG4, RF, HUGO, antismooth muscle antibodies, though will hold for now  -NPO (except meds), for now (for bowel rest) then should slowly resume PO feedings as tolerated within 24 hours  - Morphine 1mg Q4H PRN for pain managment  -Will start Lactated Ringer at a rate of 150mL/h (for now)  -Strict I&O's with a goal urine output of 1cc/kg/hour  -Close monitoring and correction of electrolyte derangements  -Will hold additional antimicrobials, for now, as there is no suspicion of infected pancreatic necrosis  -Vital Signs monitoring  GI Dr. Hill

## 2020-11-25 NOTE — ED PROVIDER NOTE - PMH
Diabetes mellitus    Gastritis    Hyperlipidemia    Hypertension    NIKKI (obstructive sleep apnea)

## 2020-11-25 NOTE — ED ADULT NURSE NOTE - OBJECTIVE STATEMENT
pt presents to ED with complaints of generalized abdominal pain with nausea and vomiting x2 at home. Last BM at 8 pm. Denies chest pain, sob and fever.  # [id number]  name: Zita

## 2020-11-25 NOTE — H&P ADULT - PROBLEM SELECTOR PLAN 5
-Lipid Panel with AM labs  -Will resume patient's home medication STATIN , will hold fenofibrate for now

## 2020-11-25 NOTE — H&P ADULT - PROBLEM SELECTOR PLAN 3
PT ON MULTIPLE MEDICATION AT HOME , WILL HOLD ALL   Hemoglobin A1c with AM labs  -Blood Glucose Monitoring ACHS  - Sliding Scale ACHS  -NPO (except meds), for now (for bowel rest) then should slowly resume PO feedings as tolerated within 24 hours  -Patient should follow-up with Endocrinology as an outpatient after discharge

## 2020-11-25 NOTE — ED ADULT NURSE NOTE - IN THE PAST 12 MONTHS HAVE YOU USED DRUGS OTHER THAN THOSE REQUIRED FOR MEDICAL REASON?
[place] 
 
 
 [address] [facility] [address] [facility] 
[phone] Patient: Justo Dubois MRN: [id number] [id number] Visit Information Date & Time Provider Department Dept. Phone Encounter #  
 2018  8:40 AM Charu Henriquez MD [facility] [address] [id number] Upcoming Health Maintenance Date Due FOBT Q 1 YEAR AGE 50-75 2006 Influenza Age 5 to Adult 2017 BREAST CANCER SCRN MAMMOGRAM 2019 DTaP/Tdap/Td series (2 - Td) 2019 PAP AKA CERVICAL CYTOLOGY 2020 Allergies as of 2018  Review Complete On: 2018 By: Charu Henriquez MD  
 No Known Allergies Current Immunizations  Reviewed on 2017 Name Date Tdap 2009 Not reviewed this visit You Were Diagnosed With   
  
 Codes Comments Mixed hyperlipidemia    -  Primary ICD-10-CM: D54.0 ICD-9-CM: 272.2 Elevated blood pressure reading without diagnosis of hypertension     ICD-10-CM: R03.0 ICD-9-CM: 796.2 Vitals BP Pulse Temp Resp Height(growth percentile) Weight(growth percentile) 143/80 (BP 1 Location: Left arm, BP Patient Position: Sitting) 99 98.2 °F (36.8 °C) (Oral) 16 5' 9\" (1.753 m) 167 lb (75.8 kg) SpO2 BMI OB Status Smoking Status 98% 24.66 kg/m2 Postmenopausal Never Smoker Vitals History BMI and BSA Data Body Mass Index Body Surface Area  
 24.66 kg/m 2 1.92 m 2 Preferred Pharmacy Pharmacy Name Phone [address] [address] [phone] Your Updated Medication List  
  
   
This list is accurate as of: 18  9:37 AM.  Always use your most recent med list.  
  
  
  
  
 atorvastatin 10 mg tablet Commonly known as:  LIPITOR Take 0.5 Tabs by mouth daily. Prescriptions Sent to Pharmacy  Refills  
 atorvastatin (LIPITOR) 10 mg tablet 0  
 Sig: Take 0.5 Tabs by mouth daily. Class: Normal  
 Pharmacy: [id number], [address] #: [phone] Route: Oral  
  
To-Do List   
 01/30/2018 Lab:  LIPID PANEL   
  
 01/30/2018 Lab:  METABOLIC PANEL, COMPREHENSIVE Introducing [facility]! Alison Hightower introduces [employer] patient portal. Now you can access parts of your medical record, email your doctor's office, and request medication refills online. 1. In your internet browser, go to [email or web address] 2. Click on the First Time User? Click Here link in the Sign In box. You will see the New Member Sign Up page. 3. Enter your [employer] Access Code exactly as it appears below. You will not need to use this code after youve completed the sign-up process. If you do not sign up before the expiration date, you must request a new code. · [employer] Access Code: [email or web address] Expires: 4/30/2018  9:36 AM 
 
4. Enter the last four digits of your Social Security Number (xxxx) and Date of Birth (mm/dd/yyyy) as indicated and click Submit. You will be taken to the next sign-up page. 5. Create a [employer] ID. This will be your [employer] login ID and cannot be changed, so think of one that is secure and easy to remember. 6. Create a [employer] password. You can change your password at any time. 7. Enter your Password Reset Question and Answer. This can be used at a later time if you forget your password. 8. Enter your e-mail address. You will receive e-mail notification when new information is available in [address] 9. Click Sign Up. You can now view and download portions of your medical record. 10. Click the Download Summary menu link to download a portable copy of your medical information. If you have questions, please visit the Frequently Asked Questions section of the [employer] website. Remember, [employer] is NOT to be used for urgent needs. For medical emergencies, dial 911. Now available from your iPhone and Android! Please provide this summary of care documentation to your next provider. Your primary care clinician is listed as Megha Eisenberg. If you have any questions after today's visit, please call [phone]. No

## 2020-11-25 NOTE — H&P ADULT - PROBLEM SELECTOR PLAN 2
Pt has wbc 17k   SIRS Criteria= 1 (WBC>12,000) + no definitive source of infection (?Gallbladder)  -Patient received Zosyn 3.375gm IV Once in the ED.  -Will hold additional antimicrobials, for now  -f/u ESR, CRP, Procalcitonin  -f/u  Ultrasound of RUQ (with attention to Gallbladder, CBD)  monitor vitals for now

## 2020-11-25 NOTE — H&P ADULT - PROBLEM SELECTOR PLAN 6
-CT Abdomen/Pelvis with IV contrast identified Right renal cysts as well as too small to characterize bilateral hypodensities.  -Should obtain Bilateral Renal Sonogram  -Patient might need Urology as an outpatient after discharge

## 2020-11-25 NOTE — CONSULT NOTE ADULT - SUBJECTIVE AND OBJECTIVE BOX
[facility]    Patient is a 59y old  Male who presents with a chief complaint of pancreatitis (25 Nov 2020 04:59)    HPI:  Patient is a 59 year old male Bolivian speaking from home live with wife walk independantly with a PMH of HTN, HLD, DM, NIKKI, h/o Left Hydrocelectomy on 7/21/2020  who presented with a chief complaint of abdominal pain.  Patient states that beginning at approximately 20:00 on the evening of 11/24, after eating dinner of steak and tomatoes. patient endorsed experiencing a severe 9/10 pain, constant, non-radiating. Pt reports pain to be in epigastric and sometimes in RUQ abdominal pain that come sand goes.  Patient denies ever experiencing symptoms such as this before. Pt deneis any recent alcohol intake, drug or smokng.  (25 Nov 2020 04:59)    PMH/PSH:  PAST MEDICAL & SURGICAL HISTORY:  NIKKI (obstructive sleep apnea)    Gastritis    Diabetes mellitus    Hyperlipidemia    Hypertension    No significant past surgical history      FH:  FAMILY HISTORY:  Family history of diabetes mellitus        MEDS:  MEDICATIONS  (STANDING):  atorvastatin 20 milliGRAM(s) Oral at bedtime  dextrose 40% Gel 15 Gram(s) Oral once  dextrose 5%. 1000 milliLiter(s) (50 mL/Hr) IV Continuous <Continuous>  dextrose 5%. 1000 milliLiter(s) (100 mL/Hr) IV Continuous <Continuous>  enalapril 20 milliGRAM(s) Oral daily  enoxaparin Injectable 40 milliGRAM(s) SubCutaneous daily  gabapentin 100 milliGRAM(s) Oral three times a day  glucagon  Injectable 1 milliGRAM(s) IntraMuscular once  HYDROmorphone  Injectable 0.5 milliGRAM(s) IV Push every 6 hours  insulin lispro (ADMELOG) corrective regimen sliding scale   SubCutaneous every 6 hours  lactated ringers. 1000 milliLiter(s) (150 mL/Hr) IV Continuous <Continuous>  pantoprazole  Injectable 40 milliGRAM(s) IV Push daily    MEDICATIONS  (PRN):  hydrALAZINE Injectable 5 milliGRAM(s) IV Push every 4 hours PRN sbp >160  morphine  - Injectable 1 milliGRAM(s) IV Push every 4 hours PRN Severe Pain (7 - 10)  ondansetron Injectable 4 milliGRAM(s) IV Push every 4 hours PRN Nausea and/or Vomiting    Allergies    No Known Allergies    Intolerances            CONSTITUTIONAL:  No weight loss, fever, chills, weakness or fatigue.  HEENT:  Eyes:  No visual loss, blurred vision, double vision or yellow sclerae. Ears, Nose, Throat:  No hearing loss, sneezing, congestion, runny nose or sore throat.  SKIN:  No rash or itching.  CARDIOVASCULAR:  No chest pain, chest pressure or chest discomfort. No palpitations or edema.  RESPIRATORY:  No shortness of breath, cough or sputum.  GASTROINTESTINAL:  SEE HPI  GENITOURINARY:  No dysuria, hematuria, urinary frequency  NEUROLOGICAL:  No headache, dizziness, syncope, paralysis, ataxia, numbness or tingling in the extremities. No change in bowel or bladder control.  MUSCULOSKELETAL:  No muscle, back pain, joint pain or stiffness.  HEMATOLOGIC:  No anemia, bleeding or bruising.  LYMPHATICS:  No enlarged nodes. No history of splenectomy.  PSYCHIATRIC:  No history of depression or anxiety.  ENDOCRINOLOGIC:  No reports of sweating, cold or heat intolerance. No polyuria or polydipsia.      ______________________________________________________________________  PHYSICAL EXAM:  T(C): 36.6 (11-25-20 @ 07:15), Max: 37.1 (11-25-20 @ 00:32)  HR: 85 (11-25-20 @ 07:15)  BP: 151/74 (11-25-20 @ 07:15)  RR: 17 (11-25-20 @ 07:15)  SpO2: 96% (11-25-20 @ 07:15)  Wt(kg): --      GEN: NAD, normocephalic  CVS: S1S2+  CHEST: clear to auscultation  ABD: soft , nontender, nondistended, bowel sounds present  EXTR: no cyanosis, no clubbing, no edema  NEURO: Awake and alert; oriented .....  SKIN:  warm;  non icteric    ______________________________________________________________________  LABS:                        16.1   17.43 )-----------( 224      ( 25 Nov 2020 01:24 )             50.8     11-25    136  |  100  |  30<H>  ----------------------------<  339<H>  4.5   |  21<L>  |  1.11    Ca    8.9      25 Nov 2020 01:24    TPro  7.2  /  Alb  3.5  /  TBili  0.7  /  DBili  x   /  AST  17  /  ALT  19  /  AlkPhos  67  11-25    LIVER FUNCTIONS - ( 25 Nov 2020 01:24 )  Alb: 3.5 g/dL / Pro: 7.2 g/dL / ALK PHOS: 67 U/L / ALT: 19 U/L DA / AST: 17 U/L / GGT: x                       [facility]    Patient is a 59y old  Male who presents with a chief complaint of pancreatitis (25 Nov 2020 04:59)    HPI:  Patient is a 59 year old male Ghanaian speaking from home live with wife walk independantly with a PMH of HTN, HLD, DM, NIKKI, h/o Left Hydrocelectomy on 7/21/2020  who presented with a chief complaint of abdominal pain.  Patient states that beginning at approximately 20:00 on the evening of 11/24, after eating dinner of steak and tomatoes. patient endorsed experiencing a severe 9/10 pain, constant, non-radiating. Pt reports pain to be in epigastric and sometimes in RUQ abdominal pain that come sand goes.  Patient denies ever experiencing symptoms such as this before. Pt deneis any recent alcohol intake, drug or smokng.  (25 Nov 2020 04:59)    PMH/PSH:  PAST MEDICAL & SURGICAL HISTORY:  NIKKI (obstructive sleep apnea)    Gastritis    Diabetes mellitus    Hyperlipidemia    Hypertension    No significant past surgical history      FH:  FAMILY HISTORY:  Family history of diabetes mellitus        MEDS:  MEDICATIONS  (STANDING):  atorvastatin 20 milliGRAM(s) Oral at bedtime  dextrose 40% Gel 15 Gram(s) Oral once  dextrose 5%. 1000 milliLiter(s) (50 mL/Hr) IV Continuous <Continuous>  dextrose 5%. 1000 milliLiter(s) (100 mL/Hr) IV Continuous <Continuous>  enalapril 20 milliGRAM(s) Oral daily  enoxaparin Injectable 40 milliGRAM(s) SubCutaneous daily  gabapentin 100 milliGRAM(s) Oral three times a day  glucagon  Injectable 1 milliGRAM(s) IntraMuscular once  HYDROmorphone  Injectable 0.5 milliGRAM(s) IV Push every 6 hours  insulin lispro (ADMELOG) corrective regimen sliding scale   SubCutaneous every 6 hours  lactated ringers. 1000 milliLiter(s) (150 mL/Hr) IV Continuous <Continuous>  pantoprazole  Injectable 40 milliGRAM(s) IV Push daily    MEDICATIONS  (PRN):  hydrALAZINE Injectable 5 milliGRAM(s) IV Push every 4 hours PRN sbp >160  morphine  - Injectable 1 milliGRAM(s) IV Push every 4 hours PRN Severe Pain (7 - 10)  ondansetron Injectable 4 milliGRAM(s) IV Push every 4 hours PRN Nausea and/or Vomiting    Allergies    No Known Allergies    Intolerances            CONSTITUTIONAL:  No weight loss, fever, chills, weakness or fatigue.  HEENT:  Eyes:  No visual loss, blurred vision, double vision or yellow sclerae. Ears, Nose, Throat:  No hearing loss, sneezing, congestion, runny nose or sore throat.  SKIN:  No rash or itching.  CARDIOVASCULAR:  No chest pain, chest pressure or chest discomfort. No palpitations or edema.  RESPIRATORY:  No shortness of breath, cough or sputum.  GASTROINTESTINAL:  SEE HPI  GENITOURINARY:  No dysuria, hematuria, urinary frequency  NEUROLOGICAL:  No headache, dizziness, syncope, paralysis, ataxia, numbness or tingling in the extremities. No change in bowel or bladder control.  MUSCULOSKELETAL:  No muscle, back pain, joint pain or stiffness.  HEMATOLOGIC:  No anemia, bleeding or bruising.  LYMPHATICS:  No enlarged nodes. No history of splenectomy.  PSYCHIATRIC:  No history of depression or anxiety.  ENDOCRINOLOGIC:  No reports of sweating, cold or heat intolerance. No polyuria or polydipsia.      ______________________________________________________________________  PHYSICAL EXAM:  T(C): 36.6 (11-25-20 @ 07:15), Max: 37.1 (11-25-20 @ 00:32)  HR: 85 (11-25-20 @ 07:15)  BP: 151/74 (11-25-20 @ 07:15)  RR: 17 (11-25-20 @ 07:15)  SpO2: 96% (11-25-20 @ 07:15)  Wt(kg): --      GEN: NAD, normocephalic  CVS: S1S2+  CHEST: clear to auscultation  ABD: soft , diffuse tenderness, nondistended, bowel sounds present, + guarding  EXTR: no cyanosis, no clubbing, no edema  NEURO: Awake and alert; non-focal exam   SKIN:  warm;  non icteric    ______________________________________________________________________  LABS:                        16.1   17.43 )-----------( 224      ( 25 Nov 2020 01:24 )             50.8     11-25    136  |  100  |  30<H>  ----------------------------<  339<H>  4.5   |  21<L>  |  1.11    Ca    8.9      25 Nov 2020 01:24    TPro  7.2  /  Alb  3.5  /  TBili  0.7  /  DBili  x   /  AST  17  /  ALT  19  /  AlkPhos  67  11-25    LIVER FUNCTIONS - ( 25 Nov 2020 01:24 )  Alb: 3.5 g/dL / Pro: 7.2 g/dL / ALK PHOS: 67 U/L / ALT: 19 U/L DA / AST: 17 U/L / GGT: x

## 2020-11-25 NOTE — CONSULT NOTE ADULT - ASSESSMENT
GI asked to evaluate this 59 year old Prydein[place] speaking male from home lives with wife walks independently.  PMH of HTN, HLD, DM, NIKKI, and  h/o Left Hydrocelectomy on 7/21/2020  who presented with a chief complaint of abdominal pain. Found  to have elevated lipase and admitted for pancreatitis. GI asked to evaluate this 59 year old Malaysian speaking male from home lives with wife walks independently.  PMH of HTN, HLD, DM, NIKKI, and  h/o Left Hydrocelectomy on 7/21/2020  who presented with a chief complaint of abdominal pain. Found  to have elevated lipase and admitted for pancreatitis.     Malaysian  # [id number]

## 2020-11-25 NOTE — H&P ADULT - NSHPPHYSICALEXAM_GEN_ALL_CORE
PHYSICAL EXAM:  GENERAL: , speaks in full sentences, no signs of respiratory distress, in distress from abd pain  HEAD:  Atraumatic, Normocephalic  EYES: EOMI, PERRLA, conjunctiva and sclera clear  NECK: Supple, No JVD  CHEST/LUNG: Clear to auscultation bilaterally; No wheeze; No crackles; No accessory muscles used  HEART: Regular rate and rhythm; No murmurs;   ABDOMEN: Soft, tender in epigastric region, distended; Bowel sounds sluggish; No guarding  EXTREMITIES:  2+ Peripheral Pulses, No cyanosis or edema  PSYCH: AAOx3  NEUROLOGY: no-focal  SKIN: No rashes or lesions

## 2020-11-25 NOTE — H&P ADULT - NSICDXPASTMEDICALHX_GEN_ALL_CORE_FT
PAST MEDICAL HISTORY:  Diabetes mellitus     Gastritis     Hyperlipidemia     Hypertension     NIKKI (obstructive sleep apnea)

## 2020-11-25 NOTE — ED PROVIDER NOTE - OBJECTIVE STATEMENT
58 y/o male h/o HTN, HLD, DM, presents with abdominal pain, onset 8pm. Endorses pain is mostly on the R side of abdomen. Reports vomiting but no diarrhea. Denies any fever, chest pain, cough, urinary sx, similar pain in the past, hx of abdominal surgery, sick contacts, or recent travel.

## 2020-11-26 LAB
ALBUMIN SERPL ELPH-MCNC: 2.8 G/DL — LOW (ref 3.5–5)
ALP SERPL-CCNC: 50 U/L — SIGNIFICANT CHANGE UP (ref 40–120)
ALT FLD-CCNC: 12 U/L DA — SIGNIFICANT CHANGE UP (ref 10–60)
ANION GAP SERPL CALC-SCNC: 14 MMOL/L — SIGNIFICANT CHANGE UP (ref 5–17)
AST SERPL-CCNC: 17 U/L — SIGNIFICANT CHANGE UP (ref 10–40)
BILIRUB SERPL-MCNC: 0.9 MG/DL — SIGNIFICANT CHANGE UP (ref 0.2–1.2)
BUN SERPL-MCNC: 16 MG/DL — SIGNIFICANT CHANGE UP (ref 7–18)
CALCIUM SERPL-MCNC: 8.7 MG/DL — SIGNIFICANT CHANGE UP (ref 8.4–10.5)
CHLORIDE SERPL-SCNC: 104 MMOL/L — SIGNIFICANT CHANGE UP (ref 96–108)
CO2 SERPL-SCNC: 23 MMOL/L — SIGNIFICANT CHANGE UP (ref 22–31)
CREAT SERPL-MCNC: 0.71 MG/DL — SIGNIFICANT CHANGE UP (ref 0.5–1.3)
CULTURE RESULTS: SIGNIFICANT CHANGE UP
GLUCOSE BLDC GLUCOMTR-MCNC: 135 MG/DL — HIGH (ref 70–99)
GLUCOSE BLDC GLUCOMTR-MCNC: 139 MG/DL — HIGH (ref 70–99)
GLUCOSE BLDC GLUCOMTR-MCNC: 150 MG/DL — HIGH (ref 70–99)
GLUCOSE SERPL-MCNC: 157 MG/DL — HIGH (ref 70–99)
HCT VFR BLD CALC: 45.5 % — SIGNIFICANT CHANGE UP (ref 39–50)
HCV AB S/CO SERPL IA: 0.06 S/CO — SIGNIFICANT CHANGE UP (ref 0–0.99)
HCV AB SERPL-IMP: SIGNIFICANT CHANGE UP
HGB BLD-MCNC: 14.5 G/DL — SIGNIFICANT CHANGE UP (ref 13–17)
LACTATE SERPL-SCNC: 1.1 MMOL/L — SIGNIFICANT CHANGE UP (ref 0.7–2)
MCHC RBC-ENTMCNC: 28.5 PG — SIGNIFICANT CHANGE UP (ref 27–34)
MCHC RBC-ENTMCNC: 31.9 GM/DL — LOW (ref 32–36)
MCV RBC AUTO: 89.4 FL — SIGNIFICANT CHANGE UP (ref 80–100)
NRBC # BLD: 0 /100 WBCS — SIGNIFICANT CHANGE UP (ref 0–0)
PLATELET # BLD AUTO: 191 K/UL — SIGNIFICANT CHANGE UP (ref 150–400)
POTASSIUM SERPL-MCNC: 4 MMOL/L — SIGNIFICANT CHANGE UP (ref 3.5–5.3)
POTASSIUM SERPL-SCNC: 4 MMOL/L — SIGNIFICANT CHANGE UP (ref 3.5–5.3)
PROT SERPL-MCNC: 6.4 G/DL — SIGNIFICANT CHANGE UP (ref 6–8.3)
RBC # BLD: 5.09 M/UL — SIGNIFICANT CHANGE UP (ref 4.2–5.8)
RBC # FLD: 14.1 % — SIGNIFICANT CHANGE UP (ref 10.3–14.5)
SARS-COV-2 IGG SERPL QL IA: NEGATIVE — SIGNIFICANT CHANGE UP
SARS-COV-2 IGM SERPL IA-ACNC: 0.07 INDEX — SIGNIFICANT CHANGE UP
SODIUM SERPL-SCNC: 141 MMOL/L — SIGNIFICANT CHANGE UP (ref 135–145)
SPECIMEN SOURCE: SIGNIFICANT CHANGE UP
WBC # BLD: 27.19 K/UL — HIGH (ref 3.8–10.5)
WBC # FLD AUTO: 27.19 K/UL — HIGH (ref 3.8–10.5)

## 2020-11-26 PROCEDURE — 99233 SBSQ HOSP IP/OBS HIGH 50: CPT | Mod: GC

## 2020-11-26 RX ORDER — PIPERACILLIN AND TAZOBACTAM 4; .5 G/20ML; G/20ML
3.38 INJECTION, POWDER, LYOPHILIZED, FOR SOLUTION INTRAVENOUS EVERY 8 HOURS
Refills: 0 | Status: DISCONTINUED | OUTPATIENT
Start: 2020-11-26 | End: 2020-11-28

## 2020-11-26 RX ORDER — PIPERACILLIN AND TAZOBACTAM 4; .5 G/20ML; G/20ML
3.38 INJECTION, POWDER, LYOPHILIZED, FOR SOLUTION INTRAVENOUS ONCE
Refills: 0 | Status: COMPLETED | OUTPATIENT
Start: 2020-11-26 | End: 2020-11-26

## 2020-11-26 RX ORDER — HYDRALAZINE HCL 50 MG
25 TABLET ORAL THREE TIMES A DAY
Refills: 0 | Status: DISCONTINUED | OUTPATIENT
Start: 2020-11-26 | End: 2020-11-27

## 2020-11-26 RX ORDER — SODIUM CHLORIDE 9 MG/ML
1000 INJECTION, SOLUTION INTRAVENOUS
Refills: 0 | Status: DISCONTINUED | OUTPATIENT
Start: 2020-11-26 | End: 2020-11-30

## 2020-11-26 RX ORDER — PIPERACILLIN AND TAZOBACTAM 4; .5 G/20ML; G/20ML
INJECTION, POWDER, LYOPHILIZED, FOR SOLUTION INTRAVENOUS
Refills: 0 | Status: DISCONTINUED | OUTPATIENT
Start: 2020-11-26 | End: 2020-11-28

## 2020-11-26 RX ADMIN — Medication 20 MILLIGRAM(S): at 06:48

## 2020-11-26 RX ADMIN — PIPERACILLIN AND TAZOBACTAM 200 GRAM(S): 4; .5 INJECTION, POWDER, LYOPHILIZED, FOR SOLUTION INTRAVENOUS at 13:38

## 2020-11-26 RX ADMIN — HYDROMORPHONE HYDROCHLORIDE 0.5 MILLIGRAM(S): 2 INJECTION INTRAMUSCULAR; INTRAVENOUS; SUBCUTANEOUS at 00:45

## 2020-11-26 RX ADMIN — GABAPENTIN 100 MILLIGRAM(S): 400 CAPSULE ORAL at 06:48

## 2020-11-26 RX ADMIN — PANTOPRAZOLE SODIUM 40 MILLIGRAM(S): 20 TABLET, DELAYED RELEASE ORAL at 11:51

## 2020-11-26 RX ADMIN — HYDROMORPHONE HYDROCHLORIDE 0.5 MILLIGRAM(S): 2 INJECTION INTRAMUSCULAR; INTRAVENOUS; SUBCUTANEOUS at 17:59

## 2020-11-26 RX ADMIN — Medication 25 MILLIGRAM(S): at 13:38

## 2020-11-26 RX ADMIN — PIPERACILLIN AND TAZOBACTAM 25 GRAM(S): 4; .5 INJECTION, POWDER, LYOPHILIZED, FOR SOLUTION INTRAVENOUS at 21:17

## 2020-11-26 RX ADMIN — MORPHINE SULFATE 1 MILLIGRAM(S): 50 CAPSULE, EXTENDED RELEASE ORAL at 12:30

## 2020-11-26 RX ADMIN — HYDROMORPHONE HYDROCHLORIDE 0.5 MILLIGRAM(S): 2 INJECTION INTRAMUSCULAR; INTRAVENOUS; SUBCUTANEOUS at 17:29

## 2020-11-26 RX ADMIN — HYDROMORPHONE HYDROCHLORIDE 0.5 MILLIGRAM(S): 2 INJECTION INTRAMUSCULAR; INTRAVENOUS; SUBCUTANEOUS at 06:48

## 2020-11-26 RX ADMIN — HYDROMORPHONE HYDROCHLORIDE 0.5 MILLIGRAM(S): 2 INJECTION INTRAMUSCULAR; INTRAVENOUS; SUBCUTANEOUS at 07:11

## 2020-11-26 RX ADMIN — ENOXAPARIN SODIUM 40 MILLIGRAM(S): 100 INJECTION SUBCUTANEOUS at 11:51

## 2020-11-26 RX ADMIN — MORPHINE SULFATE 1 MILLIGRAM(S): 50 CAPSULE, EXTENDED RELEASE ORAL at 20:19

## 2020-11-26 RX ADMIN — SODIUM CHLORIDE 150 MILLILITER(S): 9 INJECTION, SOLUTION INTRAVENOUS at 00:47

## 2020-11-26 RX ADMIN — ONDANSETRON 4 MILLIGRAM(S): 8 TABLET, FILM COATED ORAL at 11:57

## 2020-11-26 RX ADMIN — GABAPENTIN 100 MILLIGRAM(S): 400 CAPSULE ORAL at 13:38

## 2020-11-26 RX ADMIN — HYDROMORPHONE HYDROCHLORIDE 0.5 MILLIGRAM(S): 2 INJECTION INTRAMUSCULAR; INTRAVENOUS; SUBCUTANEOUS at 01:10

## 2020-11-26 RX ADMIN — HYDROMORPHONE HYDROCHLORIDE 0.5 MILLIGRAM(S): 2 INJECTION INTRAMUSCULAR; INTRAVENOUS; SUBCUTANEOUS at 14:15

## 2020-11-26 RX ADMIN — GABAPENTIN 100 MILLIGRAM(S): 400 CAPSULE ORAL at 21:17

## 2020-11-26 RX ADMIN — Medication 25 MILLIGRAM(S): at 21:17

## 2020-11-26 RX ADMIN — ATORVASTATIN CALCIUM 20 MILLIGRAM(S): 80 TABLET, FILM COATED ORAL at 21:17

## 2020-11-26 RX ADMIN — HYDROMORPHONE HYDROCHLORIDE 0.5 MILLIGRAM(S): 2 INJECTION INTRAMUSCULAR; INTRAVENOUS; SUBCUTANEOUS at 23:56

## 2020-11-26 RX ADMIN — MORPHINE SULFATE 1 MILLIGRAM(S): 50 CAPSULE, EXTENDED RELEASE ORAL at 19:49

## 2020-11-26 RX ADMIN — MORPHINE SULFATE 1 MILLIGRAM(S): 50 CAPSULE, EXTENDED RELEASE ORAL at 11:51

## 2020-11-26 RX ADMIN — HYDROMORPHONE HYDROCHLORIDE 0.5 MILLIGRAM(S): 2 INJECTION INTRAMUSCULAR; INTRAVENOUS; SUBCUTANEOUS at 13:38

## 2020-11-26 NOTE — PROGRESS NOTE ADULT - PROBLEM SELECTOR PLAN 6
-CT Abdomen/Pelvis with IV contrast identified Right renal cysts as well as too small to characterize bilateral hypodensities.  -Should obtain Bilateral Renal Sonogram  -Patient might need Urology as an outpatient after discharge - CT Abdomen/Pelvis with IV contrast identified Right renal cysts as well as too small to characterize bilateral hypodensities.  - Should obtain Bilateral Renal Sonogram  - Patient might need Urology as an outpatient after discharge

## 2020-11-26 NOTE — PROGRESS NOTE ADULT - PROBLEM SELECTOR PLAN 3
PT ON MULTIPLE MEDICATION AT HOME , WILL HOLD ALL   Hemoglobin A1c with AM labs  -Blood Glucose Monitoring ACHS  - Sliding Scale ACHS  -NPO (except meds), for now (for bowel rest) then should slowly resume PO feedings as tolerated within 24 hours  -Patient should follow-up with Endocrinology as an outpatient after discharge - Pt taking farxiga, metformin-glyburide BD, victoza at home, holding all as NPO  - farxiga has case reports of DIP  - Hemoglobin A1c 9.2  - Blood Glucose Monitoring Q6h while NPO  - Sliding Scale Q6h  - NPO (except meds) will attempt reinitiation of diet as tolerated within 24 hours    - Endocrinology f/u outpatient

## 2020-11-26 NOTE — PROGRESS NOTE ADULT - ASSESSMENT
Patient is a 59 year old male Iranian speaking from home live with wife walk independantly with a PMH of HTN, HLD, DM, NIKKI, h/o Left Hydrocelectomy on 7/21/2020  who presented with a chief complaint of abdominal pain. Found  to have elevated lipase and admitted for pancreatitis.       ************PRIMARY TEAM PLEASE CONFIRM MEDS IN AM****** Patient is 59M, Nigerien speaking, lives at home with his wife, walks independently with a PMHx of HTN, HLD, DM, NIKKI, h/o Left Hydrocelectomy on 7/21/2020 who presented with a chief complaint of severe abdominal pain x 1 day; found to have elevated lipase and acute interstitial pancreatitis on CT, likely etiology gallstones vs drug induced.

## 2020-11-26 NOTE — PROGRESS NOTE ADULT - PROBLEM SELECTOR PLAN 5
-Lipid Panel with AM labs  -Will resume patient's home medication STATIN , will hold fenofibrate for now - Lipid Panel wnl, TG 92 at admission  - Statin resumed , fenofibrate held (Class 1A risk for DIP)

## 2020-11-26 NOTE — PROGRESS NOTE ADULT - SUBJECTIVE AND OBJECTIVE BOX
PGY-1 Progress Note discussed with attending    PAGER #: [[phone]] TILL 5:00 PM  PLEASE CONTACT ON CALL TEAM:  - On Call Team (Please refer to Melodie) FROM 5:00 PM - 8:30PM  - Nightfloat Team FROM 8:30 -7:30 AM    CHIEF COMPLAINT & BRIEF HOSPITAL COURSE:    INTERVAL HPI/OVERNIGHT EVENTS:       REVIEW OF SYSTEMS:  CONSTITUTIONAL: No fever, weight loss, or fatigue  RESPIRATORY: No cough, wheezing, chills or hemoptysis; No shortness of breath  CARDIOVASCULAR: No chest pain, palpitations, dizziness, or leg swelling  GASTROINTESTINAL: No abdominal pain. No nausea, vomiting, or hematemesis; No diarrhea or constipation. No melena or hematochezia.  GENITOURINARY: No dysuria or hematuria, urinary frequency  NEUROLOGICAL: No headaches, memory loss, loss of strength, numbness, or tremors  SKIN: No itching, burning, rashes, or lesions     MEDICATIONS  (STANDING):  atorvastatin 20 milliGRAM(s) Oral at bedtime  dextrose 40% Gel 15 Gram(s) Oral once  enalapril 20 milliGRAM(s) Oral daily  enoxaparin Injectable 40 milliGRAM(s) SubCutaneous daily  gabapentin 100 milliGRAM(s) Oral three times a day  glucagon  Injectable 1 milliGRAM(s) IntraMuscular once  hydrALAZINE 25 milliGRAM(s) Oral three times a day  HYDROmorphone  Injectable 0.5 milliGRAM(s) IV Push every 6 hours  insulin lispro (ADMELOG) corrective regimen sliding scale   SubCutaneous every 6 hours  lactated ringers. 1000 milliLiter(s) (150 mL/Hr) IV Continuous <Continuous>  pantoprazole  Injectable 40 milliGRAM(s) IV Push daily  piperacillin/tazobactam IVPB. 3.375 Gram(s) IV Intermittent once  piperacillin/tazobactam IVPB..        MEDICATIONS  (PRN):  morphine  - Injectable 1 milliGRAM(s) IV Push every 4 hours PRN Severe Pain (7 - 10)  ondansetron Injectable 4 milliGRAM(s) IV Push every 4 hours PRN Nausea and/or Vomiting      Vital Signs Last 24 Hrs  T(C): 36.7 (26 Nov 2020 05:40), Max: 37.1 (25 Nov 2020 20:25)  T(F): 98 (26 Nov 2020 05:40), Max: 98.7 (25 Nov 2020 20:25)  HR: 83 (26 Nov 2020 05:40) (82 - 88)  BP: 159/62 (26 Nov 2020 05:40) (148/72 - 172/80)  BP(mean): --  RR: 18 (26 Nov 2020 05:40) (16 - 18)  SpO2: 97% (26 Nov 2020 05:40) (95% - 97%)    PHYSICAL EXAMINATION:  GENERAL: NAD, well built  HEAD:  Atraumatic, Normocephalic  EYES:  conjunctiva and sclera clear  NECK: Supple, No JVD, Normal thyroid  CHEST/LUNG: Clear to auscultation. Clear to percussion bilaterally; No rales, rhonchi, wheezing, or rubs  HEART: Regular rate and rhythm; No murmurs, rubs, or gallops  ABDOMEN: Soft, Nontender, Nondistended; Bowel sounds present  NERVOUS SYSTEM:  Alert & Oriented X3,    EXTREMITIES:  2+ Peripheral Pulses, No clubbing, cyanosis, or edema  SKIN: warm dry                          14.5   27.19 )-----------( 191      ( 26 Nov 2020 06:01 )             45.5     11-26    141  |  104  |  16  ----------------------------<  157<H>  4.0   |  23  |  0.71    Ca    8.7      26 Nov 2020 06:01  Phos  4.0     11-25  Mg     2.3     11-25    TPro  6.4  /  Alb  2.8<L>  /  TBili  0.9  /  DBili  x   /  AST  17  /  ALT  12  /  AlkPhos  50  11-26    LIVER FUNCTIONS - ( 26 Nov 2020 06:01 )  Alb: 2.8 g/dL / Pro: 6.4 g/dL / ALK PHOS: 50 U/L / ALT: 12 U/L DA / AST: 17 U/L / GGT: x           CARDIAC MARKERS ( 25 Nov 2020 01:24 )  <0.015 ng/mL / x     / x     / x     / x              CAPILLARY BLOOD GLUCOSE      RADIOLOGY & ADDITIONAL TESTS:                   PGY-1 Progress Note discussed with attending    PAGER #: [[phone]] TILL 5:00 PM  PLEASE CONTACT ON CALL TEAM:  - On Call Team (Please refer to Melodie) FROM 5:00 PM - 8:30PM  - Nightfloat Team FROM 8:30 -7:30 AM    CHIEF COMPLAINT & BRIEF HOSPITAL COURSE:  Patient is 59M, Serbian speaking, lives at home with his wife, walks independently with a PMHx of HTN, HLD, DM, NIKKI, h/o Left Hydrocelectomy on 7/21/2020 who presented with a chief complaint of sever abdominal pain x 1 day. Patient states that it began around 20:00 on 11/24, after eating a dinner of steak and tomatoes. He endorsed severe 9/10 pain, constant, located mostly in the epigastric area radiating to flanks, and associated with nausea, and vomiting. He denies prior episodes of these symptoms, any recent alcohol intake, drug or smoking.     INTERVAL HPI/OVERNIGHT EVENTS:       REVIEW OF SYSTEMS:  CONSTITUTIONAL: No fever, weight loss, or fatigue  RESPIRATORY: No cough, wheezing, chills or hemoptysis; No shortness of breath  CARDIOVASCULAR: No chest pain, palpitations, dizziness, or leg swelling  GASTROINTESTINAL: No abdominal pain. No nausea, vomiting, or hematemesis; No diarrhea or constipation. No melena or hematochezia.  GENITOURINARY: No dysuria or hematuria, urinary frequency  NEUROLOGICAL: No headaches, memory loss, loss of strength, numbness, or tremors  SKIN: No itching, burning, rashes, or lesions     MEDICATIONS  (STANDING):  atorvastatin 20 milliGRAM(s) Oral at bedtime  dextrose 40% Gel 15 Gram(s) Oral once  enalapril 20 milliGRAM(s) Oral daily  enoxaparin Injectable 40 milliGRAM(s) SubCutaneous daily  gabapentin 100 milliGRAM(s) Oral three times a day  glucagon  Injectable 1 milliGRAM(s) IntraMuscular once  hydrALAZINE 25 milliGRAM(s) Oral three times a day  HYDROmorphone  Injectable 0.5 milliGRAM(s) IV Push every 6 hours  insulin lispro (ADMELOG) corrective regimen sliding scale   SubCutaneous every 6 hours  lactated ringers. 1000 milliLiter(s) (150 mL/Hr) IV Continuous <Continuous>  pantoprazole  Injectable 40 milliGRAM(s) IV Push daily  piperacillin/tazobactam IVPB. 3.375 Gram(s) IV Intermittent once  piperacillin/tazobactam IVPB..        MEDICATIONS  (PRN):  morphine  - Injectable 1 milliGRAM(s) IV Push every 4 hours PRN Severe Pain (7 - 10)  ondansetron Injectable 4 milliGRAM(s) IV Push every 4 hours PRN Nausea and/or Vomiting      Vital Signs Last 24 Hrs  T(C): 36.7 (26 Nov 2020 05:40), Max: 37.1 (25 Nov 2020 20:25)  T(F): 98 (26 Nov 2020 05:40), Max: 98.7 (25 Nov 2020 20:25)  HR: 83 (26 Nov 2020 05:40) (82 - 88)  BP: 159/62 (26 Nov 2020 05:40) (148/72 - 172/80)  BP(mean): --  RR: 18 (26 Nov 2020 05:40) (16 - 18)  SpO2: 97% (26 Nov 2020 05:40) (95% - 97%)    PHYSICAL EXAMINATION:  GENERAL: NAD, well built  HEAD:  Atraumatic, Normocephalic  EYES:  conjunctiva and sclera clear  NECK: Supple, No JVD, Normal thyroid  CHEST/LUNG: Clear to auscultation. Clear to percussion bilaterally; No rales, rhonchi, wheezing, or rubs  HEART: Regular rate and rhythm; No murmurs, rubs, or gallops  ABDOMEN: Soft, Nontender, Nondistended; Bowel sounds present  NERVOUS SYSTEM:  Alert & Oriented X3,    EXTREMITIES:  2+ Peripheral Pulses, No clubbing, cyanosis, or edema  SKIN: warm dry                          14.5   27.19 )-----------( 191      ( 26 Nov 2020 06:01 )             45.5     11-26    141  |  104  |  16  ----------------------------<  157<H>  4.0   |  23  |  0.71    Ca    8.7      26 Nov 2020 06:01  Phos  4.0     11-25  Mg     2.3     11-25    TPro  6.4  /  Alb  2.8<L>  /  TBili  0.9  /  DBili  x   /  AST  17  /  ALT  12  /  AlkPhos  50  11-26    LIVER FUNCTIONS - ( 26 Nov 2020 06:01 )  Alb: 2.8 g/dL / Pro: 6.4 g/dL / ALK PHOS: 50 U/L / ALT: 12 U/L DA / AST: 17 U/L / GGT: x           CARDIAC MARKERS ( 25 Nov 2020 01:24 )  <0.015 ng/mL / x     / x     / x     / x              CAPILLARY BLOOD GLUCOSE      RADIOLOGY & ADDITIONAL TESTS:                   PGY-1 Progress Note discussed with attending    PAGER #: [[phone]] TILL 5:00 PM  PLEASE CONTACT ON CALL TEAM:  - On Call Team (Please refer to Melodie) FROM 5:00 PM - 8:30PM  - Nightfloat Team FROM 8:30 -7:30 AM    CHIEF COMPLAINT & BRIEF HOSPITAL COURSE:  Patient is 59M, Haitian speaking, lives at home with his wife, walks independently with a PMHx of HTN, HLD, DM, NIKKI, h/o Left Hydrocelectomy on 7/21/2020 who presented with a chief complaint of severe abdominal pain x 1 day. Patient states that it began around 20:00 on 11/24, after eating a dinner of steak and tomatoes. He endorsed severe 9/10 pain, constant, located mostly in the epigastric area radiating to flanks, and associated with nausea, and vomiting. He denies prior episodes of these symptoms, any recent alcohol intake, drug or smoking. He was found to have Ct findings consistent with acute interstitial pancreatitis, BISAP 2, Janki's 3. RUQ US was equivocal for gallstones, and patient is planned for HIDA scan 11/27. Currently on aggressive IV hydration, and pain control; started on antibiotics for worsening wbc count, pain and unknown source. Awaiting blood cultures.     INTERVAL HPI/OVERNIGHT EVENTS:   Patient was examined while he was lying in bed, Aox3, responding appropriately to questions, in NAD. He has normal bowel and bladder movements, and denies any episodes of nausea, vomiting, diarrhea.     REVIEW OF SYSTEMS:  CONSTITUTIONAL: Fatigue + No fever, weight loss  RESPIRATORY: No cough, wheezing, chills or hemoptysis; No shortness of breath  CARDIOVASCULAR: No chest pain, palpitations, dizziness, or leg swelling  GASTROINTESTINAL: Abdominal Pain +, Nausea + No vomiting, or hematemesis; No diarrhea or constipation. No melena or hematochezia.  GENITOURINARY: No dysuria or hematuria, urinary frequency  NEUROLOGICAL: No headaches, memory loss, loss of strength, numbness, or tremors  SKIN: No itching, burning, rashes, or lesions     MEDICATIONS  (STANDING):  atorvastatin 20 milliGRAM(s) Oral at bedtime  dextrose 40% Gel 15 Gram(s) Oral once  enalapril 20 milliGRAM(s) Oral daily  enoxaparin Injectable 40 milliGRAM(s) SubCutaneous daily  gabapentin 100 milliGRAM(s) Oral three times a day  glucagon  Injectable 1 milliGRAM(s) IntraMuscular once  hydrALAZINE 25 milliGRAM(s) Oral three times a day  HYDROmorphone  Injectable 0.5 milliGRAM(s) IV Push every 6 hours  insulin lispro (ADMELOG) corrective regimen sliding scale   SubCutaneous every 6 hours  lactated ringers. 1000 milliLiter(s) (150 mL/Hr) IV Continuous <Continuous>  pantoprazole  Injectable 40 milliGRAM(s) IV Push daily  piperacillin/tazobactam IVPB. 3.375 Gram(s) IV Intermittent once  piperacillin/tazobactam IVPB..        MEDICATIONS  (PRN):  morphine  - Injectable 1 milliGRAM(s) IV Push every 4 hours PRN Severe Pain (7 - 10)  ondansetron Injectable 4 milliGRAM(s) IV Push every 4 hours PRN Nausea and/or Vomiting      Vital Signs Last 24 Hrs  T(C): 36.7 (26 Nov 2020 05:40), Max: 37.1 (25 Nov 2020 20:25)  T(F): 98 (26 Nov 2020 05:40), Max: 98.7 (25 Nov 2020 20:25)  HR: 83 (26 Nov 2020 05:40) (82 - 88)  BP: 159/62 (26 Nov 2020 05:40) (148/72 - 172/80)  BP(mean): --  RR: 18 (26 Nov 2020 05:40) (16 - 18)  SpO2: 97% (26 Nov 2020 05:40) (95% - 97%)    PHYSICAL EXAMINATION:  GENERAL: Well built [place] man, appears stated age, obese, in some pain   HEAD:  Atraumatic, Normocephalic  EYES:  conjunctiva and sclera clear  NECK: Supple, No JVD, Normal thyroid  CHEST/LUNG: Clear to auscultation. Clear to percussion bilaterally; No rales, rhonchi, wheezing, or rubs  HEART: Regular rate and rhythm; No murmurs, rubs, or gallops  ABDOMEN: Soft, tender in epigastrium, RUQ, distended with fat, kim's sign +; Bowel sounds present  NERVOUS SYSTEM:  Alert & Oriented X3, no motor or sensory loss   EXTREMITIES:  2+ Peripheral Pulses, No clubbing, cyanosis, or edema  SKIN: warm dry                          14.5   27.19 )-----------( 191      ( 26 Nov 2020 06:01 )             45.5     11-26    141  |  104  |  16  ----------------------------<  157<H>  4.0   |  23  |  0.71    Ca    8.7      26 Nov 2020 06:01  Phos  4.0     11-25  Mg     2.3     11-25    TPro  6.4  /  Alb  2.8<L>  /  TBili  0.9  /  DBili  x   /  AST  17  /  ALT  12  /  AlkPhos  50  11-26    LIVER FUNCTIONS - ( 26 Nov 2020 06:01 )  Alb: 2.8 g/dL / Pro: 6.4 g/dL / ALK PHOS: 50 U/L / ALT: 12 U/L DA / AST: 17 U/L / GGT: x           CARDIAC MARKERS ( 25 Nov 2020 01:24 )  <0.015 ng/mL / x     / x     / x     / x              CAPILLARY BLOOD GLUCOSE      RADIOLOGY & ADDITIONAL TESTS:

## 2020-11-26 NOTE — PROGRESS NOTE ADULT - PROBLEM SELECTOR PLAN 1
pT P/W ABD PAIN AND elevated lipase 11,046, meet 2/3 criterion for acute pancreatitis  -CT Abdomen/Pelvis with IV contrast identified findings compatible with acute interstitial pancreatitis. No peripancreatic abscess.  Mild gallbladder wall edema.  -BISAP Score= 2 (BUN>25, Age>60), Patients with a BISAP Score >0 had an increasing risk of mortality, with mortality increasing significantly with a score of 3 or greater.  -f/u Alcohol, Blood , UDS  -f/u Amylase, Lipid Panel, GGT, Vitamin D, ESR, CRP   -If above studies entirely unremarkable, can potentially consider also sending IgG4, RF, HUGO, antismooth muscle antibodies, though will hold for now  -NPO (except meds), for now (for bowel rest) then should slowly resume PO feedings as tolerated within 24 hours  - Morphine 1mg Q4H PRN for pain managment  -Will start Lactated Ringer at a rate of 150mL/h (for now)  -Strict I&O's with a goal urine output of 1cc/kg/hour  -Close monitoring and correction of electrolyte derangements  -Will hold additional antimicrobials, for now, as there is no suspicion of infected pancreatic necrosis  -Vital Signs monitoring  GI Dr. Hill - Pt p/w abdominal pain and elevated lipase 11,046 -> downtrending  - CT Abdomen/Pelvis with IV contrast identified findings compatible with acute interstitial pancreatitis. No peripancreatic abscess.  Mild gallbladder wall edema.  - RUQ US shows biliary sludge, no evidence of GS, biliary dilatation   - HIDA Scan planned for   - BISAP Score= 2 (BUN>25, Age>60), Loraine score: 3  - COLE<3, UDS +'ve for opiates   - Amylase elevated (529)   - Lipid Panel: Wnl, T, pt on statin and fenofibrate at home  - ESR 6, CRP elevated 4.68   - Holding autoimmune workup for now: IgG4, RF, HUGO, antismooth muscle antibodies, until more common etiologies ruled out   - NPO (except meds), to advance diet as tolerated within 24 hours  - Dilaudid 0.5mg Q6h, Morphine 1mg Q4H PRN for pain management  - C/w Lactated Ringer at a rate of 150mL/h (for now)  - Strict I&O's with a goal urine output of 1cc/kg/hour  - Close monitoring and correction of electrolyte derangements  - As patient WBC I trending up to 27, and pain is worsening, started zosyn.  - Vital Signs monitoring  - GI Dr. Hill

## 2020-11-26 NOTE — PROGRESS NOTE ADULT - PROBLEM SELECTOR PLAN 4
Will resume patient's home medication ENALAPRIL  -Vital Signs Q6 - Will resume patient's home medication enalapril (reports of DIP)  - Vital Signs Q6

## 2020-11-26 NOTE — PROGRESS NOTE ADULT - PROBLEM SELECTOR PLAN 2
Pt has wbc 17k   SIRS Criteria= 1 (WBC>12,000) + no definitive source of infection (?Gallbladder)  -Patient received Zosyn 3.375gm IV Once in the ED.  -Will hold additional antimicrobials, for now  -f/u ESR, CRP, Procalcitonin  -f/u  Ultrasound of RUQ (with attention to Gallbladder, CBD)  monitor vitals for now - Pt has wbc uptrending 17k -> 27k, no fevers, abdominal pain status quo  SIRS Criteria= 1 (WBC>12,000) + no definitive source of infection (?Gallbladder)  - F/u Blood cultures   - C/w Zosyn 3.375gm for now   - ESR 6, CRP elevated 4.68, Procalcitonin 0.27  - f/u HIDA as RUQ US is equivocal  - monitor vitals for now

## 2020-11-27 LAB
ALBUMIN SERPL ELPH-MCNC: 2.4 G/DL — LOW (ref 3.5–5)
ALP SERPL-CCNC: 51 U/L — SIGNIFICANT CHANGE UP (ref 40–120)
ALT FLD-CCNC: 9 U/L DA — LOW (ref 10–60)
ANION GAP SERPL CALC-SCNC: 11 MMOL/L — SIGNIFICANT CHANGE UP (ref 5–17)
AST SERPL-CCNC: 16 U/L — SIGNIFICANT CHANGE UP (ref 10–40)
BILIRUB SERPL-MCNC: 0.9 MG/DL — SIGNIFICANT CHANGE UP (ref 0.2–1.2)
BUN SERPL-MCNC: 14 MG/DL — SIGNIFICANT CHANGE UP (ref 7–18)
CA-I BLD-SCNC: 1.23 MMOL/L — SIGNIFICANT CHANGE UP (ref 1.12–1.3)
CALCIUM SERPL-MCNC: 8.6 MG/DL — SIGNIFICANT CHANGE UP (ref 8.4–10.5)
CHLORIDE SERPL-SCNC: 99 MMOL/L — SIGNIFICANT CHANGE UP (ref 96–108)
CO2 SERPL-SCNC: 27 MMOL/L — SIGNIFICANT CHANGE UP (ref 22–31)
CREAT SERPL-MCNC: 0.51 MG/DL — SIGNIFICANT CHANGE UP (ref 0.5–1.3)
GLUCOSE BLDC GLUCOMTR-MCNC: 101 MG/DL — HIGH (ref 70–99)
GLUCOSE BLDC GLUCOMTR-MCNC: 111 MG/DL — HIGH (ref 70–99)
GLUCOSE BLDC GLUCOMTR-MCNC: 122 MG/DL — HIGH (ref 70–99)
GLUCOSE SERPL-MCNC: 103 MG/DL — HIGH (ref 70–99)
HCT VFR BLD CALC: 41.6 % — SIGNIFICANT CHANGE UP (ref 39–50)
HGB BLD-MCNC: 13.3 G/DL — SIGNIFICANT CHANGE UP (ref 13–17)
LDH SERPL L TO P-CCNC: 285 U/L — HIGH (ref 120–225)
MCHC RBC-ENTMCNC: 29 PG — SIGNIFICANT CHANGE UP (ref 27–34)
MCHC RBC-ENTMCNC: 32 GM/DL — SIGNIFICANT CHANGE UP (ref 32–36)
MCV RBC AUTO: 90.6 FL — SIGNIFICANT CHANGE UP (ref 80–100)
NRBC # BLD: 0 /100 WBCS — SIGNIFICANT CHANGE UP (ref 0–0)
PLATELET # BLD AUTO: 165 K/UL — SIGNIFICANT CHANGE UP (ref 150–400)
POTASSIUM SERPL-MCNC: 3.8 MMOL/L — SIGNIFICANT CHANGE UP (ref 3.5–5.3)
POTASSIUM SERPL-SCNC: 3.8 MMOL/L — SIGNIFICANT CHANGE UP (ref 3.5–5.3)
PROT SERPL-MCNC: 6.2 G/DL — SIGNIFICANT CHANGE UP (ref 6–8.3)
RBC # BLD: 4.59 M/UL — SIGNIFICANT CHANGE UP (ref 4.2–5.8)
RBC # FLD: 14 % — SIGNIFICANT CHANGE UP (ref 10.3–14.5)
SODIUM SERPL-SCNC: 137 MMOL/L — SIGNIFICANT CHANGE UP (ref 135–145)
WBC # BLD: 23.02 K/UL — HIGH (ref 3.8–10.5)
WBC # FLD AUTO: 23.02 K/UL — HIGH (ref 3.8–10.5)

## 2020-11-27 PROCEDURE — 78226 HEPATOBILIARY SYSTEM IMAGING: CPT | Mod: 26

## 2020-11-27 PROCEDURE — 99233 SBSQ HOSP IP/OBS HIGH 50: CPT | Mod: GC

## 2020-11-27 PROCEDURE — 99223 1ST HOSP IP/OBS HIGH 75: CPT | Mod: GC

## 2020-11-27 RX ORDER — MORPHINE SULFATE 50 MG/1
4 CAPSULE, EXTENDED RELEASE ORAL ONCE
Refills: 0 | Status: DISCONTINUED | OUTPATIENT
Start: 2020-11-27 | End: 2020-11-27

## 2020-11-27 RX ORDER — INFLUENZA VIRUS VACCINE 15; 15; 15; 15 UG/.5ML; UG/.5ML; UG/.5ML; UG/.5ML
0.5 SUSPENSION INTRAMUSCULAR ONCE
Refills: 0 | Status: COMPLETED | OUTPATIENT
Start: 2020-11-27 | End: 2020-11-27

## 2020-11-27 RX ORDER — HYDRALAZINE HCL 50 MG
50 TABLET ORAL THREE TIMES A DAY
Refills: 0 | Status: DISCONTINUED | OUTPATIENT
Start: 2020-11-27 | End: 2020-11-30

## 2020-11-27 RX ADMIN — HYDROMORPHONE HYDROCHLORIDE 0.5 MILLIGRAM(S): 2 INJECTION INTRAMUSCULAR; INTRAVENOUS; SUBCUTANEOUS at 00:26

## 2020-11-27 RX ADMIN — MORPHINE SULFATE 1 MILLIGRAM(S): 50 CAPSULE, EXTENDED RELEASE ORAL at 04:27

## 2020-11-27 RX ADMIN — PIPERACILLIN AND TAZOBACTAM 25 GRAM(S): 4; .5 INJECTION, POWDER, LYOPHILIZED, FOR SOLUTION INTRAVENOUS at 21:23

## 2020-11-27 RX ADMIN — PIPERACILLIN AND TAZOBACTAM 25 GRAM(S): 4; .5 INJECTION, POWDER, LYOPHILIZED, FOR SOLUTION INTRAVENOUS at 15:13

## 2020-11-27 RX ADMIN — GABAPENTIN 100 MILLIGRAM(S): 400 CAPSULE ORAL at 15:19

## 2020-11-27 RX ADMIN — ATORVASTATIN CALCIUM 20 MILLIGRAM(S): 80 TABLET, FILM COATED ORAL at 21:23

## 2020-11-27 RX ADMIN — GABAPENTIN 100 MILLIGRAM(S): 400 CAPSULE ORAL at 21:23

## 2020-11-27 RX ADMIN — GABAPENTIN 100 MILLIGRAM(S): 400 CAPSULE ORAL at 06:03

## 2020-11-27 RX ADMIN — MORPHINE SULFATE 4 MILLIGRAM(S): 50 CAPSULE, EXTENDED RELEASE ORAL at 12:38

## 2020-11-27 RX ADMIN — Medication 50 MILLIGRAM(S): at 21:23

## 2020-11-27 RX ADMIN — MORPHINE SULFATE 1 MILLIGRAM(S): 50 CAPSULE, EXTENDED RELEASE ORAL at 04:57

## 2020-11-27 RX ADMIN — HYDROMORPHONE HYDROCHLORIDE 0.5 MILLIGRAM(S): 2 INJECTION INTRAMUSCULAR; INTRAVENOUS; SUBCUTANEOUS at 06:33

## 2020-11-27 RX ADMIN — MORPHINE SULFATE 1 MILLIGRAM(S): 50 CAPSULE, EXTENDED RELEASE ORAL at 20:29

## 2020-11-27 RX ADMIN — PIPERACILLIN AND TAZOBACTAM 25 GRAM(S): 4; .5 INJECTION, POWDER, LYOPHILIZED, FOR SOLUTION INTRAVENOUS at 06:08

## 2020-11-27 RX ADMIN — Medication 50 MILLIGRAM(S): at 15:21

## 2020-11-27 RX ADMIN — Medication 20 MILLIGRAM(S): at 06:02

## 2020-11-27 RX ADMIN — HYDROMORPHONE HYDROCHLORIDE 0.5 MILLIGRAM(S): 2 INJECTION INTRAMUSCULAR; INTRAVENOUS; SUBCUTANEOUS at 06:03

## 2020-11-27 RX ADMIN — PANTOPRAZOLE SODIUM 40 MILLIGRAM(S): 20 TABLET, DELAYED RELEASE ORAL at 15:13

## 2020-11-27 RX ADMIN — MORPHINE SULFATE 1 MILLIGRAM(S): 50 CAPSULE, EXTENDED RELEASE ORAL at 16:58

## 2020-11-27 RX ADMIN — Medication 25 MILLIGRAM(S): at 06:02

## 2020-11-27 RX ADMIN — MORPHINE SULFATE 1 MILLIGRAM(S): 50 CAPSULE, EXTENDED RELEASE ORAL at 21:07

## 2020-11-27 RX ADMIN — MORPHINE SULFATE 1 MILLIGRAM(S): 50 CAPSULE, EXTENDED RELEASE ORAL at 16:28

## 2020-11-27 NOTE — PROGRESS NOTE ADULT - SUBJECTIVE AND OBJECTIVE BOX
[facility] Progress Note discussed with attending and covering resident      CHIEF COMPLAINT & BRIEF HOSPITAL COURSE:    Patient is a 59 y old male with a PMH of T2DM, HTN, hyperlipidemia, and NIKKI who is complaining of epigastric abdominal pain, rated 9/10, constant, and radiating to the flanks that began 3 days ago after eating steak and potatoes for dinner. In the ED, he was found to have a soft, distended abdomen with sluggish bowel sounds and tenderness in his epigastric region.  Labs showed an elevated [place] of [address], elevated BUN of 30, elevated glucose of 339, and elevated WBC count of 17.43.  His BISAP score was 2 and ARMADNO score was 3 on admission.  CT showed acute interstitial pancreatitis with no abscesses and US of liver and pancreas showed no gallstones.  Following admission, he was given dilaudid .5 mg push every 6 hours, LR at 150 ml/hr, and 1 mg morphine push as needed.  He has been NPO with small sips of water and ice cubes.  Today, he reports that he has only had mild improvement in pain since admission and that he only experiences 2 hours of pain relief after receiving dilaudid.  Due to lack of improvement of his pain, his dilaudid is being discontinued today and his pain will be managed with 1 mg morphine push every 4 hours.  He is scheduled to undergo HIDA scan today to assess for gallstone etiology.      INTERVAL HPI/OVERNIGHT EVENTS:       REVIEW OF SYSTEMS:  CONSTITUTIONAL: No fever, weight loss, or fatigue  RESPIRATORY: No cough, wheezing, chills or hemoptysis; No shortness of breath  CARDIOVASCULAR: No chest pain, palpitations, dizziness, or leg swelling  GASTROINTESTINAL: No abdominal pain. No nausea, vomiting, or hematemesis; No diarrhea or constipation. No melena or hematochezia.  GENITOURINARY: No dysuria or hematuria, urinary frequency  NEUROLOGICAL: No headaches, memory loss, loss of strength, numbness, or tremors  SKIN: No itching, burning, rashes, or lesions     MEDICATIONS  (STANDING):  atorvastatin 20 milliGRAM(s) Oral at bedtime  dextrose 40% Gel 15 Gram(s) Oral once  enoxaparin Injectable 40 milliGRAM(s) SubCutaneous daily  gabapentin 100 milliGRAM(s) Oral three times a day  glucagon  Injectable 1 milliGRAM(s) IntraMuscular once  hydrALAZINE 50 milliGRAM(s) Oral three times a day  insulin lispro (ADMELOG) corrective regimen sliding scale   SubCutaneous every 6 hours  lactated ringers. 1000 milliLiter(s) (150 mL/Hr) IV Continuous <Continuous>  pantoprazole  Injectable 40 milliGRAM(s) IV Push daily  piperacillin/tazobactam IVPB..      piperacillin/tazobactam IVPB.. 3.375 Gram(s) IV Intermittent every 8 hours    MEDICATIONS  (PRN):  morphine  - Injectable 1 milliGRAM(s) IV Push every 4 hours PRN Severe Pain (7 - 10)  ondansetron Injectable 4 milliGRAM(s) IV Push every 4 hours PRN Nausea and/or Vomiting      Vital Signs Last 24 Hrs  T(C): 36.6 (27 Nov 2020 05:15), Max: 36.8 (26 Nov 2020 20:25)  T(F): 97.9 (27 Nov 2020 05:15), Max: 98.3 (26 Nov 2020 20:25)  HR: 81 (27 Nov 2020 05:15) (81 - 88)  BP: 152/59 (27 Nov 2020 05:15) (152/59 - 163/61)  BP(mean): --  RR: 18 (27 Nov 2020 05:15) (17 - 18)  SpO2: 97% (27 Nov 2020 05:15) (94% - 98%)    PHYSICAL EXAMINATION:  GENERAL: NAD, well built  HEAD:  Atraumatic, Normocephalic  EYES:  conjunctiva and sclera clear  NECK: Supple, No JVD, Normal thyroid  CHEST/LUNG: Clear to auscultation. Clear to percussion bilaterally; No rales, rhonchi, wheezing, or rubs  HEART: Regular rate and rhythm; No murmurs, rubs, or gallops  ABDOMEN: Soft, Nontender, Nondistended; Bowel sounds present  NERVOUS SYSTEM:  Alert & Oriented X3,    EXTREMITIES:  2+ Peripheral Pulses, No clubbing, cyanosis, or edema  SKIN: warm dry                          13.3   23.02 )-----------( 165      ( 27 Nov 2020 07:10 )             41.6     11-27    137  |  99  |  14  ----------------------------<  103<H>  3.8   |  27  |  0.51    Ca    8.6      27 Nov 2020 07:10    TPro  6.2  /  Alb  2.4<L>  /  TBili  0.9  /  DBili  x   /  AST  16  /  ALT  9<L>  /  AlkPhos  51  11-27    LIVER FUNCTIONS - ( 27 Nov 2020 07:10 )  Alb: 2.4 g/dL / Pro: 6.2 g/dL / ALK PHOS: 51 U/L / ALT: 9 U/L DA / AST: 16 U/L / GGT: x                   CAPILLARY BLOOD GLUCOSE      RADIOLOGY & ADDITIONAL TESTS:                   [facility] Progress Note discussed with attending and covering resident      CHIEF COMPLAINT & BRIEF HOSPITAL COURSE:    Patient is a 59 y old male with a PMH of T2DM, HTN, hyperlipidemia, and NIKKI who is complaining of epigastric abdominal pain, rated 9/10, constant,non-radiating that began 3 days ago 11/24 after eating steak and potatoes for dinner. In the ED, he was found to have a soft, distended abdomen with sluggish bowel sounds and tenderness in epigastric region. Labs showed an elevated Lipase level of [address], elevated BUN of 30, elevated glucose of 339, and an elevated WBC count of 17.43.  His BISAP score was 2(Elevated BUN, Age >60) and ARMANDO score was 3(Elevated WBC >16k,Age >55, Glucose >200) on admission.  CT showed acute interstitial pancreatitis with no abscesses and US of gallbladder showed no gallstones.  Following admission, he was given dilaudid .5 mg push every 6 hours, LR at 150 ml/hr, and 1 mg morphine push as needed.  He has been NPO with small sips of water.  His WBC count increased to 27.3 on 11/26, however he has remained afebrile and his count went down to 20.3 today.  Today, he reports that he has had only mild improvement in pain since admission and that he only experiences 2 hours of pain relief after receiving dilaudid push. He is scheduled to undergo HIDA scan today to assess for gallstone etiology.  The Dilaudid is being discontinued today and his pain will be managed with 1 mg morphine push every 4 hours.        INTERVAL HPI/OVERNIGHT EVENTS:       REVIEW OF SYSTEMS:  CONSTITUTIONAL: No fever, weight loss, or fatigue  RESPIRATORY: No cough, wheezing, chills or hemoptysis; No shortness of breath  CARDIOVASCULAR: No chest pain, palpitations, dizziness, or leg swelling  GASTROINTESTINAL: 9/10 epigastric pain, No nausea, vomiting, or hematemesis; No diarrhea or constipation. No melena or hematochezia.  GENITOURINARY: No dysuria or hematuria, urinary frequency  NEUROLOGICAL: No headaches, memory loss, loss of strength, numbness, or tremors  SKIN: No itching, burning, rashes, or lesions     MEDICATIONS  (STANDING):  atorvastatin 20 milliGRAM(s) Oral at bedtime  dextrose 40% Gel 15 Gram(s) Oral once  enoxaparin Injectable 40 milliGRAM(s) SubCutaneous daily  gabapentin 100 milliGRAM(s) Oral three times a day  glucagon  Injectable 1 milliGRAM(s) IntraMuscular once  hydrALAZINE 50 milliGRAM(s) Oral three times a day  insulin lispro (ADMELOG) corrective regimen sliding scale   SubCutaneous every 6 hours  lactated ringers. 1000 milliLiter(s) (150 mL/Hr) IV Continuous <Continuous>  pantoprazole  Injectable 40 milliGRAM(s) IV Push daily  piperacillin/tazobactam IVPB..      piperacillin/tazobactam IVPB.. 3.375 Gram(s) IV Intermittent every 8 hours    MEDICATIONS  (PRN):  morphine  - Injectable 1 milliGRAM(s) IV Push every 4 hours PRN Severe Pain (7 - 10)  ondansetron Injectable 4 milliGRAM(s) IV Push every 4 hours PRN Nausea and/or Vomiting      Vital Signs Last 24 Hrs  T(C): 36.6 (27 Nov 2020 05:15), Max: 36.8 (26 Nov 2020 20:25)  T(F): 97.9 (27 Nov 2020 05:15), Max: 98.3 (26 Nov 2020 20:25)  HR: 81 (27 Nov 2020 05:15) (81 - 88)  BP: 152/59 (27 Nov 2020 05:15) (152/59 - 163/61)  BP(mean): --  RR: 18 (27 Nov 2020 05:15) (17 - 18)  SpO2: 97% (27 Nov 2020 05:15) (94% - 98%)    PHYSICAL EXAMINATION:  GENERAL: NAD, well built  HEAD:  Atraumatic, Normocephalic  EYES:  conjunctiva and sclera clear  NECK: Supple, No JVD, Normal thyroid  CHEST/LUNG: Clear to auscultation. Clear to percussion bilaterally; No rales, rhonchi, wheezing, or rubs  HEART: Regular rate and rhythm; No murmurs, rubs, or gallops  ABDOMEN: Soft, tenderness in epigastric region, Nondistended; Bowel sounds present  NERVOUS SYSTEM:  Alert & Oriented X3,    EXTREMITIES:  2+ Peripheral Pulses, No clubbing, cyanosis, or edema  SKIN: warm dry                          13.3   23.02 )-----------( 165      ( 27 Nov 2020 07:10 )             41.6     11-27    137  |  99  |  14  ----------------------------<  103<H>  3.8   |  27  |  0.51    Ca    8.6      27 Nov 2020 07:10    TPro  6.2  /  Alb  2.4<L>  /  TBili  0.9  /  DBili  x   /  AST  16  /  ALT  9<L>  /  AlkPhos  51  11-27    LIVER FUNCTIONS - ( 27 Nov 2020 07:10 )  Alb: 2.4 g/dL / Pro: 6.2 g/dL / ALK PHOS: 51 U/L / ALT: 9 U/L DA / AST: 16 U/L / GGT: x                   CAPILLARY BLOOD GLUCOSE      RADIOLOGY & ADDITIONAL TESTS:

## 2020-11-27 NOTE — PROGRESS NOTE ADULT - PROBLEM SELECTOR PLAN 6
- CT Abdomen/Pelvis with IV contrast identified Right renal cysts as well as too small to characterize bilateral hypodensities.  - Should obtain Bilateral Renal Sonogram  - Patient might need Urology as an outpatient after discharge - CT Abdomen/Pelvis with IV contrast identified Right renal cysts as well as too small to characterize bilateral hypodensities.  - May need Bilateral Renal Sonogram at a later date  - Nephrology outpatient f/u

## 2020-11-27 NOTE — PROGRESS NOTE ADULT - PROBLEM SELECTOR PLAN 1
- Pt p/w abdominal pain and elevated lipase 11,046 -> downtrending  - CT Abdomen/Pelvis with IV contrast identified findings compatible with acute interstitial pancreatitis. No peripancreatic abscess.  Mild gallbladder wall edema.  - RUQ US shows biliary sludge, no evidence of GS, biliary dilatation   - HIDA Scan planned for   - BISAP Score= 2 (BUN>25, Age>60), [place] score: 3  - COLE<3, UDS +'ve for opiates   - Amylase elevated (529)   - Lipid Panel: Wnl, T, pt on statin and fenofibrate at home  - ESR 6, CRP elevated 4.68   - Holding autoimmune workup for now: IgG4, RF, HUGO, antismooth muscle antibodies, until more common etiologies ruled out   - NPO (except meds), to advance diet as tolerated within 24 hours  - Dilaudid 0.5mg Q6h, Morphine 1mg Q4H PRN for pain management  - C/w Lactated Ringer at a rate of 150mL/h (for now)  - Strict I&O's with a goal urine output of 1cc/kg/hour  - Close monitoring and correction of electrolyte derangements  - As patient WBC I trending up to 27, and pain is worsening, started zosyn.  - Vital Signs monitoring  - GI Dr. Hill - Pt p/w abdominal pain and elevated lipase 11,046 -> downtrending  - CT Abdomen/Pelvis with IV contrast identified findings compatible with acute interstitial pancreatitis. No peripancreatic abscess.  Mild gallbladder wall edema.  - RUQ US shows biliary sludge, no evidence of GS, biliary dilatation   - HIDA Scan done, with 4mg morphine, awaiting results   - BISAP Score= 2 (BUN>25, Age>60), [place] score: 3  - COLE<3, UDS +'ve for opiates   - Amylase elevated (529)   - Lipid Panel: Wnl, T, pt on statin and fenofibrate at home  - ESR 6, CRP elevated 4.68   - Holding autoimmune workup for now: IgG4, RF, HUGO, antismooth muscle antibodies, until more common etiologies ruled out   - NPO (except meds), to advance diet as tolerated within 24 hours  - Dilaudid 0.5mg Q6h, Morphine 1mg Q4H PRN for pain management  - C/w Lactated Ringer at a rate of 150mL/h (for now)  - Strict I&O's with a goal urine output of 1cc/kg/hour  - Close monitoring and correction of electrolyte derangements  - As patient WBC I trending up to 27, and pain is worsening, started zosyn.  - Vital Signs monitoring  - GI Dr. Hill

## 2020-11-27 NOTE — CONSULT NOTE ADULT - ASSESSMENT
Patient is a 59 year old  Citizen of [place] speaking M from home live with wife walks independently with a PMH of HTN, HLD, DM and NIKKI and PSHx of  Left Hydrocelectomy on 7/21/2020  who presented with a chief complaint of abdominal pain since Tuesday 11/24 ,admitted to Medicine for Acute Pancreatitis. Found to have Lipase 11k--> 6k, wbc count 20 k on admission with normal lactate, TG and no h/o alcohol use or any evidence of GB stones on imaging. CT a/p noted for acute pancreatitis with no evidence of necrosis or abscess. Pt was started on Zosyn yesterday after WBC trended up to 27k. Pt has been afebrile since admission , wbc count started to trend down today. PCT 0.27. Day 2 of zosyn     #1. Acute Pancreatitis without necrosis or Abscess : Since CT a/p showed no evidence of necrosis or any abscess,there is no clear indication of Antibiotics but in the setting of worsening leucocytosis     -- will recommend continuing with Zosyn 3.375 g q8 till BCx come back ( Testing) . If BCx are negative would recommend d/c Antibiotics   -- c/w IVF   -- f/u HIDA scan to r/o Cholecystitis or GB stone   -- f/u IgG subset  -- f/u GI recommendations     #2. DM-2   --A1c 9.2   -- c/w sliding scale    Patient is a 59 year old M with a PMH of HTN, HLD, DM and NIKKI and PSHx of  Left Hydrocelectomy who presented with a chief complaint of abdominal pain, admitted to Medicine for Acute Pancreatitis. Found to have Lipase 11k--> 6k, wbc count 17.4 k on admission with normal lactate, TG and no h/o alcohol use or any evidence of GB stones on imaging. Pt was started on Zosyn yesterday after WBC trended up to 27k with concern for necrotizing process. Pt has been afebrile since admission, wbc count decreased today. PCT 0.27.       #1. Acute Pancreatitis without necrosis or Abscess. However, patient with abd U/S and CT scan suggestive of GB inflammation.    Recommend:   -- continuing with Zosyn 3.375 g q8 for now.  ( Testing) .   --f/u BCs  -- c/w IVF   -- f/u HIDA scan to r/o Cholecystitis or GB stone   -- f/u IgG subset  -- f/u GI recommendations     #2. DM-2   --A1c 9.2   -- c/w sliding scale

## 2020-11-27 NOTE — DIETITIAN INITIAL EVALUATION ADULT. - PROBLEM SELECTOR PLAN 1
pT P/W ABD PAIN AND elevated lipase 11,046, meet 2/3 criterion for acute pancreatitis  -CT Abdomen/Pelvis with IV contrast identified findings compatible with acute interstitial pancreatitis. No peripancreatic abscess.  Mild gallbladder wall edema.  -BISAP Score= 2 (BUN>25, Age>60), Patients with a BISAP Score >0 had an increasing risk of mortality, with mortality increasing significantly with a score of 3 or greater.  -f/u Alcohol, Blood , UDS  -f/u Amylase, Lipid Panel, GGT, Vitamin D, ESR, CRP   -If above studies entirely unremarkable, can potentially consider also sending IgG4, RF, HUGO, antismooth muscle antibodies, though will hold for now  -NPO (except meds), for now (for bowel rest) then should slowly resume PO feedings as tolerated within 24 hours  - Morphine 1mg Q4H PRN for pain managment  -Will start Lactated Ringer at a rate of 150mL/h (for now)  -Strict I&O's with a goal urine output of 1cc/kg/hour  -Close monitoring and correction of electrolyte derangements  -Will hold additional antimicrobials, for now, as there is no suspicion of infected pancreatic necrosis  -Vital Signs monitoring  GI Dr. Hill

## 2020-11-27 NOTE — PROGRESS NOTE ADULT - ASSESSMENT
Patient is 59M, Malagasy speaking, lives at home with his wife, walks independently with a PMHx of HTN, HLD, DM, NIKKI, h/o Left Hydrocelectomy on 7/21/2020 who presented with a chief complaint of severe abdominal pain x 1 day; found to have elevated lipase and acute interstitial pancreatitis on CT, likely etiology gallstones vs drug induced.

## 2020-11-27 NOTE — PROGRESS NOTE ADULT - SUBJECTIVE AND OBJECTIVE BOX
PGY-1 Progress Note discussed with attending    PAGER #: [[phone]] TILL 5:00 PM  PLEASE CONTACT ON CALL TEAM:  - On Call Team (Please refer to Melodie) FROM 5:00 PM - 8:30PM  - Nightfloat Team FROM 8:30 -7:30 AM    CHIEF COMPLAINT & BRIEF HOSPITAL COURSE:  Patient is 59M, Danish speaking, lives at home with his wife, walks independently with a PMHx of HTN, HLD, DM, NIKKI, h/o Left Hydrocelectomy on 7/21/2020 who presented with a chief complaint of severe abdominal pain x 1 day. Patient states that it began around 20:00 on 11/24, after eating a dinner of steak and tomatoes. He endorsed severe 9/10 pain, constant, located mostly in the epigastric area radiating to flanks, and associated with nausea, and vomiting. He denies prior episodes of these symptoms, any recent alcohol intake, drug or smoking. He was found to have Ct findings consistent with acute interstitial pancreatitis, BISAP 2, Janki's 3. RUQ US was equivocal for gallstones, and patient is planned for HIDA scan 11/27. Currently on aggressive IV hydration, and pain control; started on Zosyn for worsening wbc count, pain and unknown source. Awaiting blood cultures, results of HIDA scan.     INTERVAL HPI/OVERNIGHT EVENTS:   Prydeinig  ID: [id number]  Patient was examined while he was lying in bed, Aox3, responding appropriately to questions, in NAD. He  normal bowel and bladder movements, and denies any other acute complaints.     REVIEW OF SYSTEMS:  CONSTITUTIONAL: Fatigue + No fever, weight loss  RESPIRATORY: No cough, wheezing, chills or hemoptysis; No shortness of breath  CARDIOVASCULAR: No chest pain, palpitations, dizziness, or leg swelling  GASTROINTESTINAL: Abdominal Pain +, Nausea + No vomiting, or hematemesis; No diarrhea or constipation. No melena or hematochezia.  GENITOURINARY: No dysuria or hematuria, urinary frequency  NEUROLOGICAL: No headaches, memory loss, loss of strength, numbness, or tremors  SKIN: No itching, burning, rashes, or lesions     MEDICATIONS  (STANDING):  atorvastatin 20 milliGRAM(s) Oral at bedtime  dextrose 40% Gel 15 Gram(s) Oral once  enoxaparin Injectable 40 milliGRAM(s) SubCutaneous daily  gabapentin 100 milliGRAM(s) Oral three times a day  glucagon  Injectable 1 milliGRAM(s) IntraMuscular once  hydrALAZINE 50 milliGRAM(s) Oral three times a day  insulin lispro (ADMELOG) corrective regimen sliding scale   SubCutaneous every 6 hours  lactated ringers. 1000 milliLiter(s) (150 mL/Hr) IV Continuous <Continuous>  pantoprazole  Injectable 40 milliGRAM(s) IV Push daily  piperacillin/tazobactam IVPB..      piperacillin/tazobactam IVPB.. 3.375 Gram(s) IV Intermittent every 8 hours    MEDICATIONS  (PRN):  morphine  - Injectable 1 milliGRAM(s) IV Push every 4 hours PRN Severe Pain (7 - 10)  ondansetron Injectable 4 milliGRAM(s) IV Push every 4 hours PRN Nausea and/or Vomiting      Vital Signs Last 24 Hrs  T(C): 36.6 (27 Nov 2020 05:15), Max: 36.8 (26 Nov 2020 20:25)  T(F): 97.9 (27 Nov 2020 05:15), Max: 98.3 (26 Nov 2020 20:25)  HR: 81 (27 Nov 2020 05:15) (81 - 88)  BP: 152/59 (27 Nov 2020 05:15) (152/59 - 152/64)  BP(mean): --  RR: 18 (27 Nov 2020 05:15) (18 - 18)  SpO2: 97% (27 Nov 2020 05:15) (97% - 98%)    PHYSICAL EXAMINATION:  GENERAL: Well built Prydeinig man, appears stated age, obese, in some pain   HEAD:  Atraumatic, Normocephalic  EYES:  conjunctiva and sclera clear  NECK: Supple, No JVD, Normal thyroid  CHEST/LUNG: Clear to auscultation. Clear to percussion bilaterally; No rales, rhonchi, wheezing, or rubs  HEART: Regular rate and rhythm; No murmurs, rubs, or gallops  ABDOMEN: Soft, tender in epigastrium, RUQ, distended with fat, kim's sign +, no rebound tenderness; Bowel sounds present  NERVOUS SYSTEM:  Alert & Oriented X3, no motor or sensory loss   EXTREMITIES:  2+ Peripheral Pulses, No clubbing, cyanosis, or edema  SKIN: warm dry                        13.3   23.02 )-----------( 165      ( 27 Nov 2020 07:10 )             41.6     11-27    137  |  99  |  14  ----------------------------<  103<H>  3.8   |  27  |  0.51    Ca    8.6      27 Nov 2020 07:10    TPro  6.2  /  Alb  2.4<L>  /  TBili  0.9  /  DBili  x   /  AST  16  /  ALT  9<L>  /  AlkPhos  51  11-27    LIVER FUNCTIONS - ( 27 Nov 2020 07:10 )  Alb: 2.4 g/dL / Pro: 6.2 g/dL / ALK PHOS: 51 U/L / ALT: 9 U/L DA / AST: 16 U/L / GGT: x                   CAPILLARY BLOOD GLUCOSE      RADIOLOGY & ADDITIONAL TESTS:                   PGY-1 Progress Note discussed with attending    PAGER #: [[phone]] TILL 5:00 PM  PLEASE CONTACT ON CALL TEAM:  - On Call Team (Please refer to Melodie) FROM 5:00 PM - 8:30PM  - Nightfloat Team FROM 8:30 -7:30 AM    CHIEF COMPLAINT & BRIEF HOSPITAL COURSE:  Patient is 59M, [place]ian speaking, lives at home with his wife, walks independently with a PMHx of HTN, HLD, DM, NIKKI, h/o Left Hydrocelectomy on 7/21/2020 who presented with a chief complaint of severe abdominal pain x 1 day. Patient states that it began around 20:00 on 11/24, after eating a dinner of steak and tomatoes. He endorsed severe 9/10 pain, constant, located mostly in the epigastric area radiating to flanks, and associated with nausea, and vomiting. He denies prior episodes of these symptoms, any recent alcohol intake, drug or smoking. He was found to have Ct findings consistent with acute interstitial pancreatitis, BISAP 2, Janki's 3. RUQ US was equivocal for gallstones, and patient is planned for HIDA scan 11/27. Currently on aggressive IV hydration, and pain control; started on Zosyn for worsening wbc count, pain and unknown source. Awaiting blood cultures, results of HIDA scan.     INTERVAL HPI/OVERNIGHT EVENTS:   Prydeinig  ID: [id number]  Patient was examined while he was lying in bed, Aox3, responding appropriately to questions, in NAD. He complains of ongoing abdominal pain (increased in flanks) since admission, but it has improved. There is still breakthrough pain 3-4 hours after pain injections. He continues to be NPO, and   has no appetite. He otherwise has normal bowel and bladder movements. Completed the HIDA scan today with 4mg of morphine for sphincter of oddi contraction.     REVIEW OF SYSTEMS:  CONSTITUTIONAL: Fatigue + No fever, weight loss  RESPIRATORY: No cough, wheezing, chills or hemoptysis; No shortness of breath  CARDIOVASCULAR: No chest pain, palpitations, dizziness, or leg swelling  GASTROINTESTINAL: Abdominal Pain +, Nausea + No vomiting, or hematemesis; No diarrhea or constipation. No melena or hematochezia.  GENITOURINARY: No dysuria or hematuria, urinary frequency  NEUROLOGICAL: No headaches, memory loss, loss of strength, numbness, or tremors  SKIN: No itching, burning, rashes, or lesions     MEDICATIONS  (STANDING):  atorvastatin 20 milliGRAM(s) Oral at bedtime  dextrose 40% Gel 15 Gram(s) Oral once  enoxaparin Injectable 40 milliGRAM(s) SubCutaneous daily  gabapentin 100 milliGRAM(s) Oral three times a day  glucagon  Injectable 1 milliGRAM(s) IntraMuscular once  hydrALAZINE 50 milliGRAM(s) Oral three times a day  insulin lispro (ADMELOG) corrective regimen sliding scale   SubCutaneous every 6 hours  lactated ringers. 1000 milliLiter(s) (150 mL/Hr) IV Continuous <Continuous>  pantoprazole  Injectable 40 milliGRAM(s) IV Push daily  piperacillin/tazobactam IVPB..      piperacillin/tazobactam IVPB.. 3.375 Gram(s) IV Intermittent every 8 hours    MEDICATIONS  (PRN):  morphine  - Injectable 1 milliGRAM(s) IV Push every 4 hours PRN Severe Pain (7 - 10)  ondansetron Injectable 4 milliGRAM(s) IV Push every 4 hours PRN Nausea and/or Vomiting      Vital Signs Last 24 Hrs  T(C): 36.6 (27 Nov 2020 05:15), Max: 36.8 (26 Nov 2020 20:25)  T(F): 97.9 (27 Nov 2020 05:15), Max: 98.3 (26 Nov 2020 20:25)  HR: 81 (27 Nov 2020 05:15) (81 - 88)  BP: 152/59 (27 Nov 2020 05:15) (152/59 - 152/64)  BP(mean): --  RR: 18 (27 Nov 2020 05:15) (18 - 18)  SpO2: 97% (27 Nov 2020 05:15) (97% - 98%)    PHYSICAL EXAMINATION:  GENERAL: Well built Prydeinig man, appears stated age, obese, in some pain   HEAD:  Atraumatic, Normocephalic  EYES:  conjunctiva and sclera clear  NECK: Supple, No JVD, Normal thyroid  CHEST/LUNG: Clear to auscultation. Clear to percussion bilaterally; No rales, rhonchi, wheezing, or rubs  HEART: Regular rate and rhythm; No murmurs, rubs, or gallops  ABDOMEN: Soft, tender in epigastrium, RUQ, distended with fat, kim's sign +, no rebound tenderness; Bowel sounds present  NERVOUS SYSTEM:  Alert & Oriented X3, no motor or sensory loss   EXTREMITIES:  2+ Peripheral Pulses, No clubbing, cyanosis, or edema  SKIN: warm dry                        13.3   23.02 )-----------( 165      ( 27 Nov 2020 07:10 )             41.6     11-27    137  |  99  |  14  ----------------------------<  103<H>  3.8   |  27  |  0.51    Ca    8.6      27 Nov 2020 07:10    TPro  6.2  /  Alb  2.4<L>  /  TBili  0.9  /  DBili  x   /  AST  16  /  ALT  9<L>  /  AlkPhos  51  11-27    LIVER FUNCTIONS - ( 27 Nov 2020 07:10 )  Alb: 2.4 g/dL / Pro: 6.2 g/dL / ALK PHOS: 51 U/L / ALT: 9 U/L DA / AST: 16 U/L / GGT: x                   CAPILLARY BLOOD GLUCOSE      RADIOLOGY & ADDITIONAL TESTS:

## 2020-11-27 NOTE — DIETITIAN INITIAL EVALUATION ADULT. - FACTORS AFF FOOD INTAKE
other (specify)/vomiting/pain/weakness, abdominal pain, acute pancreatitis w/out infection/necrosis, gastritis, diabetes, HTN, hyperlipidemia,  h/o Left Hydrocelectomy on 7/21/20

## 2020-11-27 NOTE — DIETITIAN INITIAL EVALUATION ADULT. - PERTINENT MEDS FT
MEDICATIONS  (STANDING):  atorvastatin 20 milliGRAM(s) Oral at bedtime  dextrose 40% Gel 15 Gram(s) Oral once  enoxaparin Injectable 40 milliGRAM(s) SubCutaneous daily  gabapentin 100 milliGRAM(s) Oral three times a day  glucagon  Injectable 1 milliGRAM(s) IntraMuscular once  hydrALAZINE 50 milliGRAM(s) Oral three times a day  insulin lispro (ADMELOG) corrective regimen sliding scale   SubCutaneous every 6 hours  lactated ringers. 1000 milliLiter(s) (150 mL/Hr) IV Continuous <Continuous>  pantoprazole  Injectable 40 milliGRAM(s) IV Push daily  piperacillin/tazobactam IVPB..      piperacillin/tazobactam IVPB.. 3.375 Gram(s) IV Intermittent every 8 hours    MEDICATIONS  (PRN):  morphine  - Injectable 1 milliGRAM(s) IV Push every 4 hours PRN Severe Pain (7 - 10)  ondansetron Injectable 4 milliGRAM(s) IV Push every 4 hours PRN Nausea and/or Vomiting

## 2020-11-27 NOTE — PROGRESS NOTE ADULT - PROBLEM SELECTOR PLAN 2
- Pt has wbc uptrending 17k -> 27k, no fevers, abdominal pain status quo  SIRS Criteria= 1 (WBC>12,000) + no definitive source of infection (?Gallbladder)  - F/u Blood cultures   - C/w Zosyn 3.375gm for now   - ESR 6, CRP elevated 4.68, Procalcitonin 0.27  - f/u HIDA as RUQ US is equivocal  - monitor vitals for now

## 2020-11-27 NOTE — PROGRESS NOTE ADULT - ASSESSMENT
Patient is 59M, [place]izean speaking, lives at home with his wife, walks independently with a PMHx of HTN, HLD, DM, NIKKI, h/o Left Hydrocelectomy on 7/21/2020 who presented with a chief complaint of severe abdominal pain x 1 day; found to have elevated lipase and acute interstitial pancreatitis on CT, likely etiology gallstones vs drug induced.

## 2020-11-27 NOTE — PROGRESS NOTE ADULT - PROBLEM SELECTOR PLAN 3
- Pt taking farxiga, metformin-glyburide BD, victoza at home, holding all as NPO  - farxiga has case reports of DIP  - Hemoglobin A1c 9.2  - Blood Glucose Monitoring Q6h while NPO  - Sliding Scale Q6h  - NPO (except meds) will attempt reinitiation of diet as tolerated within 24 hours    - Endocrinology f/u outpatient

## 2020-11-27 NOTE — DIETITIAN INITIAL EVALUATION ADULT. - OTHER INFO
Patient from home lives family. Visited pt. alert but in mild pain, presently NPO with IV fluids, pending HIDA scan today, followed by GI/team, on pain management, skin intact, no edema.

## 2020-11-27 NOTE — CONSULT NOTE ADULT - SUBJECTIVE AND OBJECTIVE BOX
HPI:  Patient is a 59 year old male Brazilian speaking from home live with wife walk independantly with a PMH of HTN, HLD, DM, NIKKI, h/o Left Hydrocelectomy on 7/21/2020  who presented with a chief complaint of abdominal pain.  Patient states that beginning at approximately 20:00 on the evening of 11/24, after eating dinner of steak and tomatoes. patient endorsed experiencing a severe 9/10 pain, constant, non-radiating. Pt reports pain to be in epigastric and sometimes in RUQ abdominal pain that come sand goes.  Patient denies ever experiencing symptoms such as this before. Pt deneis any recent alcohol intake, drug or smokng.  (25 Nov 2020 04:59)      PAST MEDICAL & SURGICAL HISTORY:  NIKKI (obstructive sleep apnea)    Gastritis    Diabetes mellitus    Hyperlipidemia    Hypertension    No significant past surgical history        MEDS:  atorvastatin 20 milliGRAM(s) Oral at bedtime  dextrose 40% Gel 15 Gram(s) Oral once  enoxaparin Injectable 40 milliGRAM(s) SubCutaneous daily  gabapentin 100 milliGRAM(s) Oral three times a day  glucagon  Injectable 1 milliGRAM(s) IntraMuscular once  hydrALAZINE 50 milliGRAM(s) Oral three times a day  insulin lispro (ADMELOG) corrective regimen sliding scale   SubCutaneous every 6 hours  lactated ringers. 1000 milliLiter(s) IV Continuous <Continuous>  morphine  - Injectable 1 milliGRAM(s) IV Push every 4 hours PRN  ondansetron Injectable 4 milliGRAM(s) IV Push every 4 hours PRN  pantoprazole  Injectable 40 milliGRAM(s) IV Push daily  piperacillin/tazobactam IVPB..      piperacillin/tazobactam IVPB.. 3.375 Gram(s) IV Intermittent every 8 hours      ALLERGIES  No Known Allergies      SOCIAL HISTORY:    FAMILY HISTORY:  Family history of diabetes mellitus        ROS:    General:	    Skin:  	  HEENT:    Respiratory and Thorax:  	  Cardiovascular:	    Gastrointestinal:	    Genitourinary:	    Musculoskeletal:	    Neurological:	    Psychiatric:	    Hematology/Lymphatics:	    Endocrine:	    PHYSICAL EXAM:    Vital Signs Last 24 Hrs  T(C): 36.6 (27 Nov 2020 05:15), Max: 36.8 (26 Nov 2020 20:25)  T(F): 97.9 (27 Nov 2020 05:15), Max: 98.3 (26 Nov 2020 20:25)  HR: 81 (27 Nov 2020 05:15) (81 - 88)  BP: 152/59 (27 Nov 2020 05:15) (152/59 - 163/61)  BP(mean): --  RR: 18 (27 Nov 2020 05:15) (17 - 18)  SpO2: 97% (27 Nov 2020 05:15) (94% - 98%)      Gen:    HEENT:    Neck:    Lymph Nodes:    Breasts:    Back:    Chest/Thorax:    Cardiovascular:    Gastrointestinal:    Genitourinary:    Rectal:    Extremities:    Vascular:    Neurological:    Skin:    Psychiatric:    LABS/DIAGNOSTIC TESTS:                          13.3   23.02 )-----------( 165      ( 27 Nov 2020 07:10 )             41.6     WBC Count: 23.02 K/uL (11-27 @ 07:10)  WBC Count: 27.19 K/uL (11-26 @ 06:01)  WBC Count: 20.21 K/uL (11-25 @ 09:53)  WBC Count: 17.43 K/uL (11-25 @ 01:24)      11-27    137  |  99  |  14  ----------------------------<  103<H>  3.8   |  27  |  0.51    Ca    8.6      27 Nov 2020 07:10    TPro  6.2  /  Alb  2.4<L>  /  TBili  0.9  /  DBili  x   /  AST  16  /  ALT  9<L>  /  AlkPhos  51  11-27          LIVER FUNCTIONS - ( 27 Nov 2020 07:10 )  Alb: 2.4 g/dL / Pro: 6.2 g/dL / ALK PHOS: 51 U/L / ALT: 9 U/L DA / AST: 16 U/L / GGT: x                 LACTATE:Lactate, Blood: 1.1 mmol/L (11-26 @ 18:49)      ABG -     CULTURES:     Culture - Urine (collected 11-25-20 @ 06:29)  Source: .Urine Clean Catch (Midstream)  Final Report (11-26-20 @ 06:18):    <10,000 CFU/mL Normal Urogenital Raiza        RADIOLOGY               HPI:  Cuban  ID : [id number]   Patient is a 59 year old  Cuban speaking M from home live with wife walks independently with a PMH of HTN, HLD, DM and NIKKI and PSHx of  Left Hydrocelectomy on 7/21/2020  who presented with a chief complaint of abdominal pain since Tuesday 11/24.  Patient states that beginning at approximately 20:00 on the evening of 11/24, after eating dinner of steak and tomatoes, he started experiencing a severe 9/10  abd pain, constant, non-radiating along with 2 episodes of Vomiting but no fever, chills , diarrhea or any other complaint at that time. Pt never had any previous episode like this before. Denies taking any new drug or alcohol .   At the time of ID encounter, Pt states his abd pain has improved from before but pt is not able to lie on the left or right side due to pain. Denies any CP, SOB, Nausea, vomiting, diarrhea or any other complaint at this time     PAST MEDICAL & SURGICAL HISTORY:  NIKKI (obstructive sleep apnea)    Gastritis    Diabetes mellitus    Hyperlipidemia    Hypertension    Diabetes      SURGICAL SURGERY : L Hydrocelectomy         MEDS:  atorvastatin 20 milliGRAM(s) Oral at bedtime  dextrose 40% Gel 15 Gram(s) Oral once  enoxaparin Injectable 40 milliGRAM(s) SubCutaneous daily  gabapentin 100 milliGRAM(s) Oral three times a day  glucagon  Injectable 1 milliGRAM(s) IntraMuscular once  hydrALAZINE 50 milliGRAM(s) Oral three times a day  insulin lispro (ADMELOG) corrective regimen sliding scale   SubCutaneous every 6 hours  lactated ringers. 1000 milliLiter(s) IV Continuous <Continuous>  morphine  - Injectable 1 milliGRAM(s) IV Push every 4 hours PRN  ondansetron Injectable 4 milliGRAM(s) IV Push every 4 hours PRN  pantoprazole  Injectable 40 milliGRAM(s) IV Push daily  piperacillin/tazobactam IVPB..      piperacillin/tazobactam IVPB.. 3.375 Gram(s) IV Intermittent every 8 hours      ALLERGIES  No Known Allergies      SOCIAL HISTORY: No smoking, Alcohol or Drug use . Was born in [facility] , came to [place] in [place]. Lives with wife .     FAMILY HISTORY:  Family history of diabetes mellitus        ROS:    General: No fever , chills 	    Skin: No rash, itching   	  HEENT: no oral ulcers, eyes or ear pain or discharge     Respiratory and Thorax: no SOB, wheezing   	  Cardiovascular: no CP, palpitations, leg swelling     Gastrointestinal:	Abd pain + , no nausea, vomiting, diarrhea  or constipation     Genitourinary: no dysuria, increased frequency or hesitancy     Musculoskeletal:	 no joint or muscle pain     Neurological: no headache or numbness 	    Endocrine: No heat or cold intolerance     PHYSICAL EXAM:    Vital Signs Last 24 Hrs  T(C): 36.6 (27 Nov 2020 05:15), Max: 36.8 (26 Nov 2020 20:25)  T(F): 97.9 (27 Nov 2020 05:15), Max: 98.3 (26 Nov 2020 20:25)  HR: 81 (27 Nov 2020 05:15) (81 - 88)  BP: 152/59 (27 Nov 2020 05:15) (152/59 - 163/61)  BP(mean): --  RR: 18 (27 Nov 2020 05:15) (17 - 18)  SpO2: 97% (27 Nov 2020 05:15) (94% - 98%)      Gen: WD mod obese M in mild distress due to pain     HEENT: Normal oral mucosa with good oral hygiene , conjunctiva clear     Neck: supple, no palpable lymph nodes , no thyroid enlargement     Lymph Nodes: not palpable     Back: No spinal tenderness     Chest/Thorax: Clear to auscultate b/l , no wheezing or rales     Cardiovascular: Normal rate and rhythm, normal S1 S2 heard , no murmur     Gastrointestinal: Soft ,non- distended, mild RUQ, epigastric and RLQ tenderness , BS not heard     Extremities: no clubbing , cyanosis or edema     Neurological: AAOx 3 , normal strength and sensations     Skin: no rash       LABS/DIAGNOSTIC TESTS:                          13.3   23.02 )-----------( 165      ( 27 Nov 2020 07:10 )             41.6     WBC Count: 23.02 K/uL (11-27 @ 07:10)  WBC Count: 27.19 K/uL (11-26 @ 06:01)  WBC Count: 20.21 K/uL (11-25 @ 09:53)  WBC Count: 17.43 K/uL (11-25 @ 01:24)      11-27    137  |  99  |  14  ----------------------------<  103<H>  3.8   |  27  |  0.51    Ca    8.6      27 Nov 2020 07:10    TPro  6.2  /  Alb  2.4<L>  /  TBili  0.9  /  DBili  x   /  AST  16  /  ALT  9<L>  /  AlkPhos  51  11-27          LIVER FUNCTIONS - ( 27 Nov 2020 07:10 )  Alb: 2.4 g/dL / Pro: 6.2 g/dL / ALK PHOS: 51 U/L / ALT: 9 U/L DA / AST: 16 U/L / GGT: x                 LACTATE:Lactate, Blood: 1.1 mmol/L (11-26 @ 18:49)      CULTURES:     Culture - Urine (collected 11-25-20 @ 06:29)  Source: .Urine Clean Catch (Midstream)  Final Report (11-26-20 @ 06:18):    <10,000 CFU/mL Normal Urogenital Raiza        RADIOLOGY:     < from: CT Abdomen and Pelvis w/ IV Cont (11.25.20 @ 04:05) >    EXAM:  CT ABDOMEN AND PELVIS IC                            Lower Thorax: No consolidation or effusion. No cardiomegaly or pericardial effusion. Coronary arterycalcification and/or stenting.    Liver: Steatosis.  Biliary: No significant dilatation. Mild gallbladder wall edema. Consider correlation with right upper quadrant ultrasound if there is concern for gallbladder disease.  Spleen: No suspicious lesions.  Pancreas: Edematous pancreas with peripancreatic stranding and mild fluid extending along the retroperitoneum spaces, compatible with acute pancreatitis. No evidence of necrosis. No significant ductal dilatation.  Adrenals: Normal.  Kidneys: Symmetrically enhancing without hydronephrosis. Right renal cysts as well as too small to characterize bilateral hypodensities.  Vessels: Normal caliber. Atherosclerotic disease of the aorta and its branches.    GI tract: No evidence of small bowel obstruction. No significant wall thickening or inflammatory changes though detailed evaluation distal GI tract is limited secondary to inadequate distention. Normal appendix.    Peritoneum/retroperitoneum and mesentery: Trace ascites. No free air. No organized fluid collection. No adenopathy.    Pelvic organs/Bladder: No significant prostatomegaly. Mildly distended bladder.    Abdominal wall: A small fat containing umbilical hernia is noted.  Bones and soft tissues: Mild multilevel degenerative changes of the spine are noted.    IMPRESSION:    Findings compatible with acute interstitial pancreatitis. No peripancreatic abscess.    Mild gallbladder wall edema. Consider correlation with right upper quadrant ultrasound if there is concern for gallbladderdisease.      < from: US Hepatic & Pancreatic (11.25.20 @ 14:37) >  INTERPRETATION:  Pancreatitis. Right-sided abdominal pain.    Right upper quadrant ultrasound.    Gallbladder sludge noted. No gallstones, significant gallbladder wall thickening or pericholecystic fluid. No biliary dilatation. Common duct measures 5 mm. Diffuse fatty liver noted. Pancreas not adequately visualized. Right kidney 13.3 cm with 2 simple cysts largest measuring up to  2.1 cm. No ascites.    Impression: Gallbladder sludge. If there is suspicion for cholecystitis, recommend HIDA scan. No biliary dilatation. Limited evaluation pancreas.        < from: Xray Chest 1 View-PORTABLE IMMEDIATE (Xray Chest 1 View-PORTABLE IMMEDIATE .) (11.25.20 @ 06:26) >  Impression: The examination limited by poor inspiratory effort, resulting in vascular crowding. No gross pulmonary consolidation, pleural effusion or pneumothorax                     HPI/Course in Hospital (Hx obtained through chart and with Botswanan  ID : [id number]):     Patient is a 59 year old  Botswanan speaking M from home, lives with wife, walks independently with a PMH of HTN, HLD, DM and NIKKI and PSHx of  Left Hydrocelectomy on 7/21/2020 who presented with a chief complaint of abdominal pain since 11/24.  Patient states that around 20:00 on the evening of 11/24, after eating dinner of steak and tomatoes, he started experiencing severe 9/10  abd pain which was constant, non-radiating along with 2 episodes of Vomiting but no fever, chills, diarrhea or other complaints. Pt never had any previous episodes like this. Denies taking any new drugs or alcohol. Pt admitted 11/25 with Dx of pancreatitis.    At the time of ID encounter, Pt states his abd pain has improved but pt is not able to lie on the left or right side due to pain. Denies CP, SOB, nausea, vomiting, diarrhea or any other complaints. Pt started on zosyn yesterday b/o concern for possible necrotizing pancreatitis.     PAST MEDICAL & SURGICAL HISTORY:  NIKKI (obstructive sleep apnea)    Gastritis    Diabetes mellitus    Hyperlipidemia    Hypertension       SURGICAL SURGERY : L Hydrocelectomy       MEDS:  atorvastatin 20 milliGRAM(s) Oral at bedtime  dextrose 40% Gel 15 Gram(s) Oral once  enoxaparin Injectable 40 milliGRAM(s) SubCutaneous daily  gabapentin 100 milliGRAM(s) Oral three times a day  glucagon  Injectable 1 milliGRAM(s) IntraMuscular once  hydrALAZINE 50 milliGRAM(s) Oral three times a day  insulin lispro (ADMELOG) corrective regimen sliding scale   SubCutaneous every 6 hours  lactated ringers. 1000 milliLiter(s) IV Continuous <Continuous>  morphine  - Injectable 1 milliGRAM(s) IV Push every 4 hours PRN  ondansetron Injectable 4 milliGRAM(s) IV Push every 4 hours PRN  pantoprazole  Injectable 40 milliGRAM(s) IV Push daily  piperacillin/tazobactam IVPB..  (D2)     piperacillin/tazobactam IVPB.. 3.375 Gram(s) IV Intermittent every 8 hours      ALLERGIES  No Known Allergies      SOCIAL HISTORY: No smoking, Alcohol or Drug use . Was born in [facility] , came [place] in [place]. Lives with wife; retired.     FAMILY HISTORY:  Family history of diabetes mellitus        ROS:    General: No fever , chills 	    Skin: No rash, itching   	  HEENT: no oral ulcers, eyes or ear pain or discharge     Respiratory and Thorax: no SOB, wheezing   	  Cardiovascular: no CP, palpitations, leg swelling     Gastrointestinal:	Abd pain + , no nausea or vomiting since admission, no diarrhea  or constipation     Genitourinary: no dysuria, increased frequency or hesitancy     Musculoskeletal:	 no joint or muscle pain     Neurological: no headache or numbness 	    Endocrine: No heat or cold intolerance     PHYSICAL EXAM:    Vital Signs Last 24 Hrs  T(C): 36.6 (27 Nov 2020 05:15), Max: 36.8 (26 Nov 2020 20:25)  T(F): 97.9 (27 Nov 2020 05:15), Max: 98.3 (26 Nov 2020 20:25)  HR: 81 (27 Nov 2020 05:15) (81 - 88)  BP: 152/59 (27 Nov 2020 05:15) (152/59 - 163/61)  BP(mean): --  RR: 18 (27 Nov 2020 05:15) (17 - 18)  SpO2: 97% (27 Nov 2020 05:15) (94% - 98%)      Gen: WD mod obese M in mild distress due to pain     HEENT: Normal oral mucosa with good oral hygiene , conjunctivae clear     Neck: supple, no thyroid enlargement     Lymph Nodes: no enlarged submental, submandibular, supraclav LNs palpable     Back: No spinal tenderness     Chest/Thorax: Clear to auscultation b/l , no wheezing or rales     Cardiovascular: S1 S2 regular with no murmurs appreciated     Gastrointestinal: Soft ,non- distended, mod RUQ, epigastric and RLQ tenderness , BS not heard     Extremities: no clubbing , cyanosis or edema     Neurological: AAOx 3 , normal strength and sensations     Skin: no rash       LABS/DIAGNOSTIC TESTS:                          13.3   23.02 )-----------( 165      ( 27 Nov 2020 07:10 )             41.6     WBC Count: 23.02 K/uL (11-27 @ 07:10)  WBC Count: 27.19 K/uL (11-26 @ 06:01)  WBC Count: 20.21 K/uL (11-25 @ 09:53)  WBC Count: 17.43 K/uL (11-25 @ 01:24)      11-27    137  |  99  |  14  ----------------------------<  103<H>  3.8   |  27  |  0.51    Ca    8.6      27 Nov 2020 07:10    TPro  6.2  /  Alb  2.4<L>  /  TBili  0.9  /  DBili  x   /  AST  16  /  ALT  9<L>  /  AlkPhos  51  11-27          LIVER FUNCTIONS - ( 27 Nov 2020 07:10 )  Alb: 2.4 g/dL / Pro: 6.2 g/dL / ALK PHOS: 51 U/L / ALT: 9 U/L DA / AST: 16 U/L / GGT: x         Urinalysis (11.25.20 @ 04:16)   Glucose Qualitative, Urine: 1000 mg/dL   Blood, Urine: Negative   pH Urine: 5.0   Color: Yellow   Urine Appearance: Clear   Bilirubin: Negative   Ketone - Urine: Moderate   Specific Gravity: 1.015   Protein, Urine: Negative   Urobilinogen: Negative   Nitrite: Negative   Leukocyte Esterase Concentration: Negative     LACTATE:Lactate, Blood: 1.1 mmol/L (11-26 @ 18:49)      CULTURES:     Culture - Urine (collected 11-25-20 @ 06:29)  Source: .Urine Clean Catch (Midstream)  Final Report (11-26-20 @ 06:18):    <10,000 CFU/mL Normal Urogenital Raiza        RADIOLOGY:     < from: CT Abdomen and Pelvis w/ IV Cont (11.25.20 @ 04:05) >    EXAM:  CT ABDOMEN AND PELVIS IC                            Lower Thorax: No consolidation or effusion. No cardiomegaly or pericardial effusion. Coronary arterycalcification and/or stenting.    Liver: Steatosis.  Biliary: No significant dilatation. Mild gallbladder wall edema. Consider correlation with right upper quadrant ultrasound if there is concern for gallbladder disease.  Spleen: No suspicious lesions.  Pancreas: Edematous pancreas with peripancreatic stranding and mild fluid extending along the retroperitoneum spaces, compatible with acute pancreatitis. No evidence of necrosis. No significant ductal dilatation.  Adrenals: Normal.  Kidneys: Symmetrically enhancing without hydronephrosis. Right renal cysts as well as too small to characterize bilateral hypodensities.  Vessels: Normal caliber. Atherosclerotic disease of the aorta and its branches.    GI tract: No evidence of small bowel obstruction. No significant wall thickening or inflammatory changes though detailed evaluation distal GI tract is limited secondary to inadequate distention. Normal appendix.    Peritoneum/retroperitoneum and mesentery: Trace ascites. No free air. No organized fluid collection. No adenopathy.    Pelvic organs/Bladder: No significant prostatomegaly. Mildly distended bladder.    Abdominal wall: A small fat containing umbilical hernia is noted.  Bones and soft tissues: Mild multilevel degenerative changes of the spine are noted.    IMPRESSION:    Findings compatible with acute interstitial pancreatitis. No peripancreatic abscess.    Mild gallbladder wall edema. Consider correlation with right upper quadrant ultrasound if there is concern for gallbladderdisease.      < from: US Hepatic & Pancreatic (11.25.20 @ 14:37) >  INTERPRETATION:  Pancreatitis. Right-sided abdominal pain.  ________________________________________________________________________________________________________________________________________  < from: US Hepatic & Pancreatic (11.25.20 @ 14:37) >  EXAM:  US LIVER AND PANCREAS                            PROCEDURE DATE:  11/25/2020          INTERPRETATION:  Pancreatitis. Right-sided abdominal pain.    Right upper quadrant ultrasound.    Gallbladder sludge noted. No gallstones, significant gallbladder wall thickening or pericholecystic fluid. No biliary dilatation. Common duct measures 5 mm. Diffuse fatty liver noted. Pancreas not adequately visualized. Right kidney 13.3 cm with 2 simple cysts largest measuring up to  2.1 cm. No ascites.    Impression: Gallbladder sludge. If there is suspicion for cholecystitis, recommend HIDA scan. No biliary dilatation. Limited evaluation pancreas.    ________________________________________________________________________________________________________________________________________________________    < from: Xray Chest 1 View-PORTABLE IMMEDIATE (Xray Chest 1 View-PORTABLE IMMEDIATE .) (11.25.20 @ 06:26) >  Impression: The examination limited by poor inspiratory effort, resulting in vascular crowding. No gross pulmonary consolidation, pleural effusion or pneumothorax

## 2020-11-27 NOTE — PATIENT PROFILE ADULT - [id number]_GEN_A_NUR
routine screenings/medication therapy/adequate rest/diet modification/blood glucose testing/activity

## 2020-11-28 LAB
ANION GAP SERPL CALC-SCNC: 15 MMOL/L — SIGNIFICANT CHANGE UP (ref 5–17)
BUN SERPL-MCNC: 14 MG/DL — SIGNIFICANT CHANGE UP (ref 7–18)
CALCIUM SERPL-MCNC: 8.7 MG/DL — SIGNIFICANT CHANGE UP (ref 8.4–10.5)
CHLORIDE SERPL-SCNC: 98 MMOL/L — SIGNIFICANT CHANGE UP (ref 96–108)
CO2 SERPL-SCNC: 23 MMOL/L — SIGNIFICANT CHANGE UP (ref 22–31)
CREAT SERPL-MCNC: 0.52 MG/DL — SIGNIFICANT CHANGE UP (ref 0.5–1.3)
GLUCOSE BLDC GLUCOMTR-MCNC: 106 MG/DL — HIGH (ref 70–99)
GLUCOSE BLDC GLUCOMTR-MCNC: 107 MG/DL — HIGH (ref 70–99)
GLUCOSE BLDC GLUCOMTR-MCNC: 124 MG/DL — HIGH (ref 70–99)
GLUCOSE BLDC GLUCOMTR-MCNC: 158 MG/DL — HIGH (ref 70–99)
GLUCOSE SERPL-MCNC: 103 MG/DL — HIGH (ref 70–99)
HCT VFR BLD CALC: 42.9 % — SIGNIFICANT CHANGE UP (ref 39–50)
HGB BLD-MCNC: 13.4 G/DL — SIGNIFICANT CHANGE UP (ref 13–17)
MCHC RBC-ENTMCNC: 28.2 PG — SIGNIFICANT CHANGE UP (ref 27–34)
MCHC RBC-ENTMCNC: 31.2 GM/DL — LOW (ref 32–36)
MCV RBC AUTO: 90.1 FL — SIGNIFICANT CHANGE UP (ref 80–100)
NRBC # BLD: 0 /100 WBCS — SIGNIFICANT CHANGE UP (ref 0–0)
PLATELET # BLD AUTO: 193 K/UL — SIGNIFICANT CHANGE UP (ref 150–400)
POTASSIUM SERPL-MCNC: 3.5 MMOL/L — SIGNIFICANT CHANGE UP (ref 3.5–5.3)
POTASSIUM SERPL-SCNC: 3.5 MMOL/L — SIGNIFICANT CHANGE UP (ref 3.5–5.3)
RBC # BLD: 4.76 M/UL — SIGNIFICANT CHANGE UP (ref 4.2–5.8)
RBC # FLD: 13.9 % — SIGNIFICANT CHANGE UP (ref 10.3–14.5)
SODIUM SERPL-SCNC: 136 MMOL/L — SIGNIFICANT CHANGE UP (ref 135–145)
WBC # BLD: 21 K/UL — HIGH (ref 3.8–10.5)
WBC # FLD AUTO: 21 K/UL — HIGH (ref 3.8–10.5)

## 2020-11-28 PROCEDURE — 99233 SBSQ HOSP IP/OBS HIGH 50: CPT

## 2020-11-28 PROCEDURE — 99233 SBSQ HOSP IP/OBS HIGH 50: CPT | Mod: GC

## 2020-11-28 RX ORDER — SODIUM CHLORIDE 9 MG/ML
1000 INJECTION, SOLUTION INTRAVENOUS
Refills: 0 | Status: DISCONTINUED | OUTPATIENT
Start: 2020-11-28 | End: 2020-11-30

## 2020-11-28 RX ORDER — CYCLOBENZAPRINE HYDROCHLORIDE 10 MG/1
5 TABLET, FILM COATED ORAL ONCE
Refills: 0 | Status: COMPLETED | OUTPATIENT
Start: 2020-11-28 | End: 2020-11-28

## 2020-11-28 RX ADMIN — ENOXAPARIN SODIUM 40 MILLIGRAM(S): 100 INJECTION SUBCUTANEOUS at 12:11

## 2020-11-28 RX ADMIN — ATORVASTATIN CALCIUM 20 MILLIGRAM(S): 80 TABLET, FILM COATED ORAL at 21:16

## 2020-11-28 RX ADMIN — PIPERACILLIN AND TAZOBACTAM 25 GRAM(S): 4; .5 INJECTION, POWDER, LYOPHILIZED, FOR SOLUTION INTRAVENOUS at 05:24

## 2020-11-28 RX ADMIN — MORPHINE SULFATE 1 MILLIGRAM(S): 50 CAPSULE, EXTENDED RELEASE ORAL at 02:21

## 2020-11-28 RX ADMIN — GABAPENTIN 100 MILLIGRAM(S): 400 CAPSULE ORAL at 13:14

## 2020-11-28 RX ADMIN — CYCLOBENZAPRINE HYDROCHLORIDE 5 MILLIGRAM(S): 10 TABLET, FILM COATED ORAL at 23:01

## 2020-11-28 RX ADMIN — PANTOPRAZOLE SODIUM 40 MILLIGRAM(S): 20 TABLET, DELAYED RELEASE ORAL at 12:12

## 2020-11-28 RX ADMIN — MORPHINE SULFATE 1 MILLIGRAM(S): 50 CAPSULE, EXTENDED RELEASE ORAL at 17:40

## 2020-11-28 RX ADMIN — MORPHINE SULFATE 1 MILLIGRAM(S): 50 CAPSULE, EXTENDED RELEASE ORAL at 21:16

## 2020-11-28 RX ADMIN — Medication 1: at 17:40

## 2020-11-28 RX ADMIN — MORPHINE SULFATE 1 MILLIGRAM(S): 50 CAPSULE, EXTENDED RELEASE ORAL at 08:52

## 2020-11-28 RX ADMIN — MORPHINE SULFATE 1 MILLIGRAM(S): 50 CAPSULE, EXTENDED RELEASE ORAL at 13:10

## 2020-11-28 RX ADMIN — MORPHINE SULFATE 1 MILLIGRAM(S): 50 CAPSULE, EXTENDED RELEASE ORAL at 13:40

## 2020-11-28 RX ADMIN — MORPHINE SULFATE 1 MILLIGRAM(S): 50 CAPSULE, EXTENDED RELEASE ORAL at 09:22

## 2020-11-28 RX ADMIN — GABAPENTIN 100 MILLIGRAM(S): 400 CAPSULE ORAL at 21:16

## 2020-11-28 RX ADMIN — MORPHINE SULFATE 1 MILLIGRAM(S): 50 CAPSULE, EXTENDED RELEASE ORAL at 21:46

## 2020-11-28 RX ADMIN — MORPHINE SULFATE 1 MILLIGRAM(S): 50 CAPSULE, EXTENDED RELEASE ORAL at 18:10

## 2020-11-28 RX ADMIN — SODIUM CHLORIDE 150 MILLILITER(S): 9 INJECTION, SOLUTION INTRAVENOUS at 15:28

## 2020-11-28 RX ADMIN — Medication 50 MILLIGRAM(S): at 13:15

## 2020-11-28 RX ADMIN — GABAPENTIN 100 MILLIGRAM(S): 400 CAPSULE ORAL at 05:24

## 2020-11-28 RX ADMIN — MORPHINE SULFATE 1 MILLIGRAM(S): 50 CAPSULE, EXTENDED RELEASE ORAL at 01:42

## 2020-11-28 RX ADMIN — Medication 50 MILLIGRAM(S): at 05:24

## 2020-11-28 RX ADMIN — Medication 50 MILLIGRAM(S): at 21:16

## 2020-11-28 NOTE — PROGRESS NOTE ADULT - SUBJECTIVE AND OBJECTIVE BOX
Subjective:     Objective:      MEDS  atorvastatin 20 milliGRAM(s) Oral at bedtime  dextrose 40% Gel 15 Gram(s) Oral once  enoxaparin Injectable 40 milliGRAM(s) SubCutaneous daily  gabapentin 100 milliGRAM(s) Oral three times a day  glucagon  Injectable 1 milliGRAM(s) IntraMuscular once  hydrALAZINE 50 milliGRAM(s) Oral three times a day  influenza   Vaccine 0.5 milliLiter(s) IntraMuscular once  insulin lispro (ADMELOG) corrective regimen sliding scale   SubCutaneous every 6 hours  lactated ringers. 1000 milliLiter(s) IV Continuous <Continuous>  morphine  - Injectable 1 milliGRAM(s) IV Push every 4 hours PRN  ondansetron Injectable 4 milliGRAM(s) IV Push every 4 hours PRN  pantoprazole  Injectable 40 milliGRAM(s) IV Push daily  piperacillin/tazobactam IVPB..      piperacillin/tazobactam IVPB.. 3.375 Gram(s) IV Intermittent every 8 hours      PHYSICAL EXAM:    Vital Signs Last 24 Hrs  T(C): 36.6 (28 Nov 2020 05:20), Max: 37.1 (27 Nov 2020 20:07)  T(F): 97.9 (28 Nov 2020 05:20), Max: 98.7 (27 Nov 2020 20:07)  HR: 69 (28 Nov 2020 13:16) (69 - 80)  BP: 142/58 (28 Nov 2020 13:16) (141/63 - 151/59)  BP(mean): --  RR: 17 (28 Nov 2020 05:20) (16 - 18)  SpO2: 97% (28 Nov 2020 05:20) (97% - 98%)    GEN:    HEENT:    CHEST/Respiratory:    Cardiovascular:    Gastrointestinal:    Genitourinary:    Extremities:     Neurological:    Skin:      LABS/DIAGNOSTIC TESTS                            13.4   21.00 )-----------( 193      ( 28 Nov 2020 07:29 )             42.9       WBC Count: 21.00 K/uL (11-28 @ 07:29)  WBC Count: 23.02 K/uL (11-27 @ 07:10)  WBC Count: 27.19 K/uL (11-26 @ 06:01)      11-28    136  |  98  |  14  ----------------------------<  103<H>  3.5   |  23  |  0.52    Ca    8.7      28 Nov 2020 07:29    TPro  6.2  /  Alb  2.4<L>  /  TBili  0.9  /  DBili  x   /  AST  16  /  ALT  9<L>  /  AlkPhos  51  11-27        CULTURES    Culture - Blood (collected 11-26-20 @ 18:55)  Source: .Blood Blood-Peripheral  Preliminary Report (11-27-20 @ 19:01):    No growth to date.    Culture - Blood (collected 11-26-20 @ 18:55)  Source: .Blood Blood-Peripheral  Preliminary Report (11-27-20 @ 19:01):    No growth to date.    Culture - Urine (collected 11-25-20 @ 06:29)  Source: .Urine Clean Catch (Midstream)  Final Report (11-26-20 @ 06:18):    <10,000 CFU/mL Normal Urogenital Raiza          RADIOLOGY  < from: CT Abdomen and Pelvis w/ IV Cont (11.25.20 @ 04:05) >  EXAM:  CT ABDOMEN AND PELVIS IC                            PROCEDURE DATE:  11/25/2020          INTERPRETATION:  CT ABDOMEN AND PELVIS WITH CONTRAST    INDICATION: Abdominal pain.    TECHNIQUE: Contrast enhanced CT of the abdomen and pelvis. Imagesare reformatted in the sagittal and coronal planes.    90 mL of Omnipaque 350 contrast material was injected IV.    COMPARISON: None.    FINDINGS:    Lower Thorax: No consolidation or effusion. No cardiomegaly or pericardial effusion. Coronary arterycalcification and/or stenting.    Liver: Steatosis.  Biliary: No significant dilatation. Mild gallbladder wall edema. Consider correlation with right upper quadrant ultrasound if there is concern for gallbladder disease.  Spleen: No suspicious lesions.  Pancreas: Edematous pancreas with peripancreatic stranding and mild fluid extending along the retroperitoneum spaces, compatible with acute pancreatitis. No evidence of necrosis. No significant ductal dilatation.  Adrenals: Normal.  Kidneys: Symmetrically enhancing without hydronephrosis. Right renal cysts as well as too small to characterize bilateral hypodensities.  Vessels: Normal caliber. Atherosclerotic disease of the aorta and its branches.    GI tract: No evidence of small bowel obstruction. No significant wall thickening or inflammatory changes though detailed evaluation distal GI tract is limited secondary to inadequate distention. Normal appendix.    Peritoneum/retroperitoneum and mesentery: Trace ascites. No free air. No organized fluid collection. No adenopathy.    Pelvic organs/Bladder: No significant prostatomegaly. Mildly distended bladder.    Abdominal wall: A small fat containing umbilical hernia is noted.  Bones and soft tissues: Mild multilevel degenerative changes of the spine are noted.    IMPRESSION:    Findings compatible with acute interstitial pancreatitis. No peripancreatic abscess.    Mild gallbladder wall edema. Consider correlation with right upper quadrant ultrasound if there is concern for gallbladderdisease.    _____________________________________________________________________________________________________________________________________________    < from: US Hepatic & Pancreatic (11.25.20 @ 14:37) >  EXAM:  US LIVER AND PANCREAS                            PROCEDURE DATE:  11/25/2020          INTERPRETATION:  Pancreatitis. Right-sided abdominal pain.    Right upper quadrant ultrasound.    Gallbladder sludge noted. No gallstones, significant gallbladder wall thickening or pericholecystic fluid. No biliary dilatation. Common duct measures 5 mm. Diffuse fatty liver noted. Pancreas not adequately visualized. Right kidney 13.3 cm with 2 simple cysts largest measuring up to  2.1 cm. No ascites.    Impression: Gallbladder sludge. If there is suspicion for cholecystitis, recommend HIDA scan. No biliary dilatation. Limited evaluation pancreas.    _________________________________________________________________________________________________________________________________________________________________    < from: NM Hepatobiliary Imaging (11.27.20 @ 14:07) >  EXAM:  NM HEPATOBILIARY IMG                            PROCEDURE DATE:  11/27/2020          INTERPRETATION:  CLINICAL INFORMATION: 59 year old male with right upper quadrant abdominal pain and pancreatitis, referred to evaluate for acute cholecystitis.    RADIOPHARMACEUTICAL: 3.3 mCi and 2.9 mCi Tc-99m-Mebrofenin, I.V.; 2 doses    TECHNIQUE: Dynamic imaging of the anterior abdomen was performed for 1 hour following injection of radiotracer. Morphine 4 mg I.V. and a second dose of radiotracer were administered at 1 hour.  Dynamic imaging was continued for 1 hour followed by static images of the abdomen in the anterior, right lateral and right anterior oblique projections.    COMPARISON: No previous hepatobiliary scan for comparison    FINDINGS: There is prompt, homogeneous uptake of radiotracer by the hepatocytes. Activity is first seen in the bowel at 20 minutes. The gallbladder is not visualized in the first hour of imaging, but was seen 10 minutes after the administration of morphine.There is good clearance of activity from the liver at the end of the study.    IMPRESSION: Normal morphine-augmented hepatobiliary scan.    No radionuclide evidence of acute cholecystitis.                               [place]  ID #[id number]    Subjective: Patient says he still is not feeling well--c/o abdominal pain although abdomen less tense    Objective:      MEDS  atorvastatin 20 milliGRAM(s) Oral at bedtime  dextrose 40% Gel 15 Gram(s) Oral once  enoxaparin Injectable 40 milliGRAM(s) SubCutaneous daily  gabapentin 100 milliGRAM(s) Oral three times a day  glucagon  Injectable 1 milliGRAM(s) IntraMuscular once  hydrALAZINE 50 milliGRAM(s) Oral three times a day  influenza   Vaccine 0.5 milliLiter(s) IntraMuscular once  insulin lispro (ADMELOG) corrective regimen sliding scale   SubCutaneous every 6 hours  lactated ringers. 1000 milliLiter(s) IV Continuous <Continuous>  morphine  - Injectable 1 milliGRAM(s) IV Push every 4 hours PRN  ondansetron Injectable 4 milliGRAM(s) IV Push every 4 hours PRN  pantoprazole  Injectable 40 milliGRAM(s) IV Push daily  piperacillin/tazobactam IVPB..  (D3)    piperacillin/tazobactam IVPB.. 3.375 Gram(s) IV Intermittent every 8 hours      PHYSICAL EXAM:    Vital Signs Last 24 Hrs  T(C): 36.6 (28 Nov 2020 05:20), Max: 37.1 (27 Nov 2020 20:07)  T(F): 97.9 (28 Nov 2020 05:20), Max: 98.7 (27 Nov 2020 20:07)  HR: 69 (28 Nov 2020 13:16) (69 - 80)  BP: 142/58 (28 Nov 2020 13:16) (141/63 - 151/59)  BP(mean): --  RR: 17 (28 Nov 2020 05:20) (16 - 18)  SpO2: 97% (28 Nov 2020 05:20) (97% - 98%)    Gen: WD mod obese M in mild distress due to pain     HEENT: Normal oral mucosa with good oral hygiene , conjunctivae clear     Neck: supple, no thyroid enlargement     Chest/Thorax: Clear to auscultation b/l    Cardiovascular: S1 S2 regular with no murmurs appreciated     Gastrointestinal: Soft ,non- distended, less tense vs yesterday, mild RUQ, epigastric and RLQ tenderness , BS present     Extremities: no clubbing , cyanosis or edema     Neurological: AAOx 3     Skin: no rash           LABS/DIAGNOSTIC TESTS                        13.4   21.00 )-----------( 193      ( 28 Nov 2020 07:29 )             42.9       WBC Count: 21.00 K/uL (11-28 @ 07:29)  WBC Count: 23.02 K/uL (11-27 @ 07:10)  WBC Count: 27.19 K/uL (11-26 @ 06:01)      11-28    136  |  98  |  14  ----------------------------<  103<H>  3.5   |  23  |  0.52    Ca    8.7      28 Nov 2020 07:29    TPro  6.2  /  Alb  2.4<L>  /  TBili  0.9  /  DBili  x   /  AST  16  /  ALT  9<L>  /  AlkPhos  51  11-27        CULTURES    Culture - Blood (collected 11-26-20 @ 18:55)  Source: .Blood Blood-Peripheral  Preliminary Report (11-27-20 @ 19:01):    No growth to date.    Culture - Blood (collected 11-26-20 @ 18:55)  Source: .Blood Blood-Peripheral  Preliminary Report (11-27-20 @ 19:01):    No growth to date.    Culture - Urine (collected 11-25-20 @ 06:29)  Source: .Urine Clean Catch (Midstream)  Final Report (11-26-20 @ 06:18):    <10,000 CFU/mL Normal Urogenital Raiza          RADIOLOGY  < from: CT Abdomen and Pelvis w/ IV Cont (11.25.20 @ 04:05) >  EXAM:  CT ABDOMEN AND PELVIS IC                            PROCEDURE DATE:  11/25/2020          INTERPRETATION:  CT ABDOMEN AND PELVIS WITH CONTRAST    INDICATION: Abdominal pain.    TECHNIQUE: Contrast enhanced CT of the abdomen and pelvis. Imagesare reformatted in the sagittal and coronal planes.    90 mL of Omnipaque 350 contrast material was injected IV.    COMPARISON: None.    FINDINGS:    Lower Thorax: No consolidation or effusion. No cardiomegaly or pericardial effusion. Coronary arterycalcification and/or stenting.    Liver: Steatosis.  Biliary: No significant dilatation. Mild gallbladder wall edema. Consider correlation with right upper quadrant ultrasound if there is concern for gallbladder disease.  Spleen: No suspicious lesions.  Pancreas: Edematous pancreas with peripancreatic stranding and mild fluid extending along the retroperitoneum spaces, compatible with acute pancreatitis. No evidence of necrosis. No significant ductal dilatation.  Adrenals: Normal.  Kidneys: Symmetrically enhancing without hydronephrosis. Right renal cysts as well as too small to characterize bilateral hypodensities.  Vessels: Normal caliber. Atherosclerotic disease of the aorta and its branches.    GI tract: No evidence of small bowel obstruction. No significant wall thickening or inflammatory changes though detailed evaluation distal GI tract is limited secondary to inadequate distention. Normal appendix.    Peritoneum/retroperitoneum and mesentery: Trace ascites. No free air. No organized fluid collection. No adenopathy.    Pelvic organs/Bladder: No significant prostatomegaly. Mildly distended bladder.    Abdominal wall: A small fat containing umbilical hernia is noted.  Bones and soft tissues: Mild multilevel degenerative changes of the spine are noted.    IMPRESSION:    Findings compatible with acute interstitial pancreatitis. No peripancreatic abscess.    Mild gallbladder wall edema. Consider correlation with right upper quadrant ultrasound if there is concern for gallbladderdisease.    _____________________________________________________________________________________________________________________________________________    < from: US Hepatic & Pancreatic (11.25.20 @ 14:37) >  EXAM:  US LIVER AND PANCREAS                            PROCEDURE DATE:  11/25/2020          INTERPRETATION:  Pancreatitis. Right-sided abdominal pain.    Right upper quadrant ultrasound.    Gallbladder sludge noted. No gallstones, significant gallbladder wall thickening or pericholecystic fluid. No biliary dilatation. Common duct measures 5 mm. Diffuse fatty liver noted. Pancreas not adequately visualized. Right kidney 13.3 cm with 2 simple cysts largest measuring up to  2.1 cm. No ascites.    Impression: Gallbladder sludge. If there is suspicion for cholecystitis, recommend HIDA scan. No biliary dilatation. Limited evaluation pancreas.    _________________________________________________________________________________________________________________________________________________________________    < from: NM Hepatobiliary Imaging (11.27.20 @ 14:07) >  EXAM:  NM HEPATOBILIARY IMG                            PROCEDURE DATE:  11/27/2020          INTERPRETATION:  CLINICAL INFORMATION: 59 year old male with right upper quadrant abdominal pain and pancreatitis, referred to evaluate for acute cholecystitis.    RADIOPHARMACEUTICAL: 3.3 mCi and 2.9 mCi Tc-99m-Mebrofenin, I.V.; 2 doses    TECHNIQUE: Dynamic imaging of the anterior abdomen was performed for 1 hour following injection of radiotracer. Morphine 4 mg I.V. and a second dose of radiotracer were administered at 1 hour.  Dynamic imaging was continued for 1 hour followed by static images of the abdomen in the anterior, right lateral and right anterior oblique projections.    COMPARISON: No previous hepatobiliary scan for comparison    FINDINGS: There is prompt, homogeneous uptake of radiotracer by the hepatocytes. Activity is first seen in the bowel at 20 minutes. The gallbladder is not visualized in the first hour of imaging, but was seen 10 minutes after the administration of morphine.There is good clearance of activity from the liver at the end of the study.    IMPRESSION: Normal morphine-augmented hepatobiliary scan.    No radionuclide evidence of acute cholecystitis.

## 2020-11-28 NOTE — PROGRESS NOTE ADULT - ASSESSMENT
INCOMPLETE NOTE---MAS    Patient is a 59 year old M with a PMH of HTN, HLD, DM and NIKKI and PSHx of  Left Hydrocelectomy who presented with a chief complaint of abdominal pain, admitted to Medicine for Acute Pancreatitis. Found to have Lipase 11k--> 6k, wbc count 17.4 k on admission with normal lactate, TG and no h/o alcohol use or any evidence of GB stones on imaging. Pt was started on Zosyn after WBC noted to increase to 27k with concern for necrotizing process. Pt afebrile with decreasing WBC. CT abd does not demonstrate presence of necrotizing pancreatitis or abscess. Abd U/S and CT abd suggestive of GB inflammation. However, HIDA scan negative.       #1. Acute Pancreatitis without necrosis or abscess.     Recommend:   -- d/c Zosyn   --advance diet as tolerated Patient is a 59 year old M with a PMH of HTN, HLD, DM and NIKKI and PSHx of  Left Hydrocelectomy who presented with a chief complaint of abdominal pain, admitted to Medicine for Acute Pancreatitis. Found to have Lipase 11k--> 6k, wbc count 17.4 k on admission with normal lactate, TG and no h/o alcohol use or any evidence of GB stones on imaging. Pt was started on Zosyn after WBC noted to increase to 27k with concern for necrotizing process. Pt afebrile with slowly decreasing WBC. BCs negative. CT abd does not demonstrate presence of necrotizing pancreatitis or abscess. Although abd U/S and CT abd suggestive of GB inflammation, HIDA scan negative.       #1. Acute Pancreatitis without necrosis or abscess. Patient improving.    Recommend:   -- d/c Zosyn   --advance diet as tolerated

## 2020-11-28 NOTE — CONSULT NOTE ADULT - SUBJECTIVE AND OBJECTIVE BOX
Patient is a 59y old  Male who presents with a chief complaint of pancreatitis (27 Nov 2020 14:13)      HPI:  Patient is a 59 year old male [place] speaking from home live with wife walk independantly with a PMH of HTN, HLD, DM, NIKKI, h/o Left Hydrocelectomy on 7/21/2020  who presented with a chief complaint of abdominal pain.  Patient states that beginning at approximately 20:00 on the evening of 11/24, after eating dinner of steak and tomatoes. patient endorsed experiencing a severe 9/10 pain, constant, non-radiating. Pt reports pain to be in epigastric and sometimes in RUQ abdominal pain that come sand goes.  Patient denies ever experiencing symptoms such as this before. Pt deneis any recent alcohol intake, drug or smokng.  (25 Nov 2020 04:59). Found to have acute pancreatitis and elevated A1c. Pt takes victoza and oral dm meds as out pt.       PAST MEDICAL & SURGICAL HISTORY:  NIKKI (obstructive sleep apnea)    Gastritis    Diabetes mellitus    Hyperlipidemia    Hypertension    No significant past surgical history           MEDICATIONS  (STANDING):  atorvastatin 20 milliGRAM(s) Oral at bedtime  dextrose 40% Gel 15 Gram(s) Oral once  enoxaparin Injectable 40 milliGRAM(s) SubCutaneous daily  gabapentin 100 milliGRAM(s) Oral three times a day  glucagon  Injectable 1 milliGRAM(s) IntraMuscular once  hydrALAZINE 50 milliGRAM(s) Oral three times a day  influenza   Vaccine 0.5 milliLiter(s) IntraMuscular once  insulin lispro (ADMELOG) corrective regimen sliding scale   SubCutaneous every 6 hours  lactated ringers. 1000 milliLiter(s) (150 mL/Hr) IV Continuous <Continuous>  pantoprazole  Injectable 40 milliGRAM(s) IV Push daily  piperacillin/tazobactam IVPB..      piperacillin/tazobactam IVPB.. 3.375 Gram(s) IV Intermittent every 8 hours    MEDICATIONS  (PRN):  morphine  - Injectable 1 milliGRAM(s) IV Push every 4 hours PRN Severe Pain (7 - 10)  ondansetron Injectable 4 milliGRAM(s) IV Push every 4 hours PRN Nausea and/or Vomiting      FAMILY HISTORY:  Family history of diabetes mellitus        SOCIAL HISTORY:      REVIEW OF SYSTEMS:  CONSTITUTIONAL: No fever, weight loss, or fatigue  EYES: No eye pain, visual disturbances, or discharge  ENT:  No difficulty hearing, tinnitus, vertigo; No sinus or throat pain  NECK: No pain or stiffness  RESPIRATORY: No cough, wheezing, chills or hemoptysis; No Shortness of Breath  CARDIOVASCULAR: No chest pain, palpitations, passing out, dizziness, or leg swelling  GASTROINTESTINAL: No abdominal or epigastric pain. No nausea, vomiting, or hematemesis; No diarrhea or constipation. No melena or hematochezia.  GENITOURINARY: No dysuria, frequency, hematuria, or incontinence  NEUROLOGICAL: No headaches, memory loss, loss of strength, numbness, or tremors  SKIN: No itching, burning, rashes, or lesions   LYMPH Nodes: No enlarged glands  ENDOCRINE: No heat or cold intolerance; No hair loss  MUSCULOSKELETAL: No joint pain or swelling; No muscle, back, or extremity pain  PSYCHIATRIC: No depression, anxiety, mood swings, or difficulty sleeping  HEME/LYMPH: No easy bruising, or bleeding gums  ALLERGY AND IMMUNOLOGIC: No hives or eczema	        Vital Signs Last 24 Hrs  T(C): 36.6 (28 Nov 2020 05:20), Max: 37.1 (27 Nov 2020 20:07)  T(F): 97.9 (28 Nov 2020 05:20), Max: 98.7 (27 Nov 2020 20:07)  HR: 80 (28 Nov 2020 05:20) (73 - 80)  BP: 151/59 (28 Nov 2020 05:20) (141/63 - 151/59)  BP(mean): --  RR: 17 (28 Nov 2020 05:20) (16 - 18)  SpO2: 97% (28 Nov 2020 05:20) (97% - 98%)      Constitutional:    HEENT: nad    Neck:  No JVD, bruits or thyromegaly    Respiratory:  Clear without rales or rhonchi    Cardiovascular:  RR without murmur, rub or gallop.    Gastrointestinal: Soft without hepatosplenomegaly.    Extremities: without cyanosis, clubbing or edema.    Neurological:  Oriented   x 3     . No gross sensory or motor defects.        LABS:                        13.4   21.00 )-----------( 193      ( 28 Nov 2020 07:29 )             42.9     11-28    136  |  98  |  14  ----------------------------<  103<H>  3.5   |  23  |  0.52    Ca    8.7      28 Nov 2020 07:29    TPro  6.2  /  Alb  2.4<L>  /  TBili  0.9  /  DBili  x   /  AST  16  /  ALT  9<L>  /  AlkPhos  51  11-27      A1C with Estimated Average Glucose (11.25.20 @ 13:23)    A1C with Estimated Average Glucose Result: 9.2: Method: Immunoassay          CAPILLARY BLOOD GLUCOSE      POCT Blood Glucose.: 124 mg/dL (28 Nov 2020 11:50)  POCT Blood Glucose.: 107 mg/dL (28 Nov 2020 06:03)  POCT Blood Glucose.: 106 mg/dL (27 Nov 2020 23:59)  POCT Blood Glucose.: 111 mg/dL (27 Nov 2020 18:10)      RADIOLOGY & ADDITIONAL STUDIES:

## 2020-11-28 NOTE — CONSULT NOTE ADULT - PROBLEM SELECTOR RECOMMENDATION 9
poorly controlled as out pt  currently decent as pt NPO  D/C VICTOZA out pt meds- due to pancreatitis  adjust meds when po intake resumes  consider basal insulin and oral dm meds upon d/c  d/w pts wife over the phone

## 2020-11-28 NOTE — CONSULT NOTE ADULT - ASSESSMENT
Patient is a 59 year old male [place] speaking from home live with wife walk independantly with a PMH of HTN, HLD, DM, NIKKI, h/o Left Hydrocelectomy on 7/21/2020  who presented with a chief complaint of abdominal pain. Found to have acute pancreatitis and elevated A1c. Pt takes victoza and oral dm meds as out pt.

## 2020-11-28 NOTE — PROGRESS NOTE ADULT - ASSESSMENT
Acute pancreatitis possibly drug induced ( Fibrate, SGLT2 inhibitors)  Uncontrolled DM  HTN  HLD       Plan:  Cont IVF  Advance diet to Clear liquid   Monitor for abd pain   Can stop Zosyn, clinically patient improved   Cont current pain management  Zofran for nausea   Follow up IgG levels  Follow up with GI recs  Cont Insulin sliding scale  Endocrine consult appreciated   Cont current meds for HTN with holding parameters   HIDA scan neg  Lovenox for DVT ppx   Full code

## 2020-11-28 NOTE — PROGRESS NOTE ADULT - SUBJECTIVE AND OBJECTIVE BOX
Patient is a 59y old  Male who presents with a chief complaint of pancreatitis (28 Nov 2020 13:21)    Patient was seen and examined at bedside   Pain is improving 6/10, denies nausea/vomiting  Maldivian  used [id number]      INTERVAL HPI/OVERNIGHT EVENTS:  T(C): 36.7 (11-28-20 @ 14:42), Max: 37.1 (11-27-20 @ 20:07)  HR: 73 (11-28-20 @ 14:42) (69 - 80)  BP: 141/59 (11-28-20 @ 14:42) (141/59 - 151/59)  RR: 17 (11-28-20 @ 14:42) (16 - 17)  SpO2: 97% (11-28-20 @ 14:42) (97% - 98%)  Wt(kg): --  I&O's Summary      REVIEW OF SYSTEMS: denies fever, chills, SOB, palpitations, chest pain, abdominal pain, nausea, vomitting, diarrhea, constipation, dizziness    MEDICATIONS  (STANDING):  atorvastatin 20 milliGRAM(s) Oral at bedtime  dextrose 40% Gel 15 Gram(s) Oral once  enoxaparin Injectable 40 milliGRAM(s) SubCutaneous daily  gabapentin 100 milliGRAM(s) Oral three times a day  glucagon  Injectable 1 milliGRAM(s) IntraMuscular once  hydrALAZINE 50 milliGRAM(s) Oral three times a day  influenza   Vaccine 0.5 milliLiter(s) IntraMuscular once  insulin lispro (ADMELOG) corrective regimen sliding scale   SubCutaneous every 6 hours  lactated ringers. 1000 milliLiter(s) (150 mL/Hr) IV Continuous <Continuous>  lactated ringers. 1000 milliLiter(s) (150 mL/Hr) IV Continuous <Continuous>  pantoprazole  Injectable 40 milliGRAM(s) IV Push daily    MEDICATIONS  (PRN):  morphine  - Injectable 1 milliGRAM(s) IV Push every 4 hours PRN Severe Pain (7 - 10)  ondansetron Injectable 4 milliGRAM(s) IV Push every 4 hours PRN Nausea and/or Vomiting      PHYSICAL EXAM:  GENERAL: NAD, well-groomed, well-developed  HEAD:  Atraumatic, Normocephalic  EYES: EOMI, PERRLA, conjunctiva and sclera clear  ENMT: No tonsillar erythema, exudates, or enlargement; Moist mucous membranes, Good dentition, No lesions  NECK: Supple, No JVD, Normal thyroid  NERVOUS SYSTEM:  Alert & Oriented X3, Good concentration; Motor Strength 5/5 B/L upper and lower extremities; DTRs 2+ intact and symmetric  CHEST/LUNG: Clear to percussion bilaterally; No rales, rhonchi, wheezing, or rubs  HEART: Regular rate and rhythm; No murmurs, rubs, or gallops  ABDOMEN: Soft, Nontender, Nondistended; Bowel sounds present  EXTREMITIES:  2+ Peripheral Pulses, No clubbing, cyanosis, or edema  LYMPH: No lymphadenopathy noted  SKIN: No rashes or lesions  LABS:                        13.4   21.00 )-----------( 193      ( 28 Nov 2020 07:29 )             42.9     11-28    136  |  98  |  14  ----------------------------<  103<H>  3.5   |  23  |  0.52    Ca    8.7      28 Nov 2020 07:29    TPro  6.2  /  Alb  2.4<L>  /  TBili  0.9  /  DBili  x   /  AST  16  /  ALT  9<L>  /  AlkPhos  51  11-27        CAPILLARY BLOOD GLUCOSE      POCT Blood Glucose.: 124 mg/dL (28 Nov 2020 11:50)  POCT Blood Glucose.: 107 mg/dL (28 Nov 2020 06:03)  POCT Blood Glucose.: 106 mg/dL (27 Nov 2020 23:59)  POCT Blood Glucose.: 111 mg/dL (27 Nov 2020 18:10)         Patient is a 59y old  Male who presents with a chief complaint of pancreatitis (28 Nov 2020 13:21)    Patient was seen and examined at bedside   Pain is improving 6/10, denies nausea/vomiting  Colombian  used [id number]      INTERVAL HPI/OVERNIGHT EVENTS:  T(C): 36.7 (11-28-20 @ 14:42), Max: 37.1 (11-27-20 @ 20:07)  HR: 73 (11-28-20 @ 14:42) (69 - 80)  BP: 141/59 (11-28-20 @ 14:42) (141/59 - 151/59)  RR: 17 (11-28-20 @ 14:42) (16 - 17)  SpO2: 97% (11-28-20 @ 14:42) (97% - 98%)  Wt(kg): --  I&O's Summary      REVIEW OF SYSTEMS: denies fever, chills, SOB, palpitations, chest pain, dizziness    MEDICATIONS  (STANDING):  atorvastatin 20 milliGRAM(s) Oral at bedtime  dextrose 40% Gel 15 Gram(s) Oral once  enoxaparin Injectable 40 milliGRAM(s) SubCutaneous daily  gabapentin 100 milliGRAM(s) Oral three times a day  glucagon  Injectable 1 milliGRAM(s) IntraMuscular once  hydrALAZINE 50 milliGRAM(s) Oral three times a day  influenza   Vaccine 0.5 milliLiter(s) IntraMuscular once  insulin lispro (ADMELOG) corrective regimen sliding scale   SubCutaneous every 6 hours  lactated ringers. 1000 milliLiter(s) (150 mL/Hr) IV Continuous <Continuous>  lactated ringers. 1000 milliLiter(s) (150 mL/Hr) IV Continuous <Continuous>  pantoprazole  Injectable 40 milliGRAM(s) IV Push daily    MEDICATIONS  (PRN):  morphine  - Injectable 1 milliGRAM(s) IV Push every 4 hours PRN Severe Pain (7 - 10)  ondansetron Injectable 4 milliGRAM(s) IV Push every 4 hours PRN Nausea and/or Vomiting      PHYSICAL EXAM:  GENERAL: in very mild distress  NERVOUS SYSTEM:  Alert & Oriented X3, Good concentration; Motor Strength 5/5 B/L upper and lower extremities  CHEST/LUNG: Clear to auscultation bilaterally; No rales, rhonchi, wheezing, or rubs  HEART: Regular rate and rhythm; No murmurs, rubs, or gallops  ABDOMEN: Soft, mild epigastric tenderness, Nondistended; Bowel sounds present  EXTREMITIES:  2+ Peripheral Pulses, No clubbing, cyanosis, or edema  SKIN: No rashes or lesions    LABS:                        13.4   21.00 )-----------( 193      ( 28 Nov 2020 07:29 )             42.9       136  |  98  |  14  ----------------------------<  103<H>  3.5   |  23  |  0.52    Ca    8.7      28 Nov 2020 07:29    TPro  6.2  /  Alb  2.4<L>  /  TBili  0.9  /  DBili  x   /  AST  16  /  ALT  9<L>  /  AlkPhos  51  11-27        CAPILLARY BLOOD GLUCOSE      POCT Blood Glucose.: 124 mg/dL (28 Nov 2020 11:50)  POCT Blood Glucose.: 107 mg/dL (28 Nov 2020 06:03)  POCT Blood Glucose.: 106 mg/dL (27 Nov 2020 23:59)  POCT Blood Glucose.: 111 mg/dL (27 Nov 2020 18:10)

## 2020-11-29 ENCOUNTER — TRANSCRIPTION ENCOUNTER (OUTPATIENT)
Age: 60
End: 2020-11-29

## 2020-11-29 LAB
ANION GAP SERPL CALC-SCNC: 16 MMOL/L — SIGNIFICANT CHANGE UP (ref 5–17)
BUN SERPL-MCNC: 11 MG/DL — SIGNIFICANT CHANGE UP (ref 7–18)
CALCIUM SERPL-MCNC: 8.7 MG/DL — SIGNIFICANT CHANGE UP (ref 8.4–10.5)
CHLORIDE SERPL-SCNC: 99 MMOL/L — SIGNIFICANT CHANGE UP (ref 96–108)
CO2 SERPL-SCNC: 22 MMOL/L — SIGNIFICANT CHANGE UP (ref 22–31)
CREAT SERPL-MCNC: 0.44 MG/DL — LOW (ref 0.5–1.3)
GLUCOSE BLDC GLUCOMTR-MCNC: 104 MG/DL — HIGH (ref 70–99)
GLUCOSE BLDC GLUCOMTR-MCNC: 108 MG/DL — HIGH (ref 70–99)
GLUCOSE BLDC GLUCOMTR-MCNC: 115 MG/DL — HIGH (ref 70–99)
GLUCOSE BLDC GLUCOMTR-MCNC: 118 MG/DL — HIGH (ref 70–99)
GLUCOSE BLDC GLUCOMTR-MCNC: 131 MG/DL — HIGH (ref 70–99)
GLUCOSE SERPL-MCNC: 108 MG/DL — HIGH (ref 70–99)
HCT VFR BLD CALC: 40.1 % — SIGNIFICANT CHANGE UP (ref 39–50)
HGB BLD-MCNC: 12.7 G/DL — LOW (ref 13–17)
MCHC RBC-ENTMCNC: 27.9 PG — SIGNIFICANT CHANGE UP (ref 27–34)
MCHC RBC-ENTMCNC: 31.7 GM/DL — LOW (ref 32–36)
MCV RBC AUTO: 87.9 FL — SIGNIFICANT CHANGE UP (ref 80–100)
NRBC # BLD: 0 /100 WBCS — SIGNIFICANT CHANGE UP (ref 0–0)
PLATELET # BLD AUTO: 223 K/UL — SIGNIFICANT CHANGE UP (ref 150–400)
POTASSIUM SERPL-MCNC: 3.3 MMOL/L — LOW (ref 3.5–5.3)
POTASSIUM SERPL-SCNC: 3.3 MMOL/L — LOW (ref 3.5–5.3)
RBC # BLD: 4.56 M/UL — SIGNIFICANT CHANGE UP (ref 4.2–5.8)
RBC # FLD: 14 % — SIGNIFICANT CHANGE UP (ref 10.3–14.5)
SODIUM SERPL-SCNC: 137 MMOL/L — SIGNIFICANT CHANGE UP (ref 135–145)
WBC # BLD: 15.88 K/UL — HIGH (ref 3.8–10.5)
WBC # FLD AUTO: 15.88 K/UL — HIGH (ref 3.8–10.5)

## 2020-11-29 PROCEDURE — 99233 SBSQ HOSP IP/OBS HIGH 50: CPT | Mod: GC

## 2020-11-29 RX ORDER — TRAMADOL HYDROCHLORIDE 50 MG/1
50 TABLET ORAL EVERY 8 HOURS
Refills: 0 | Status: DISCONTINUED | OUTPATIENT
Start: 2020-11-29 | End: 2020-11-30

## 2020-11-29 RX ORDER — INSULIN LISPRO 100/ML
VIAL (ML) SUBCUTANEOUS
Refills: 0 | Status: DISCONTINUED | OUTPATIENT
Start: 2020-11-29 | End: 2020-12-02

## 2020-11-29 RX ADMIN — MORPHINE SULFATE 1 MILLIGRAM(S): 50 CAPSULE, EXTENDED RELEASE ORAL at 09:24

## 2020-11-29 RX ADMIN — ENOXAPARIN SODIUM 40 MILLIGRAM(S): 100 INJECTION SUBCUTANEOUS at 11:32

## 2020-11-29 RX ADMIN — GABAPENTIN 100 MILLIGRAM(S): 400 CAPSULE ORAL at 05:17

## 2020-11-29 RX ADMIN — TRAMADOL HYDROCHLORIDE 50 MILLIGRAM(S): 50 TABLET ORAL at 18:35

## 2020-11-29 RX ADMIN — ATORVASTATIN CALCIUM 20 MILLIGRAM(S): 80 TABLET, FILM COATED ORAL at 22:11

## 2020-11-29 RX ADMIN — MORPHINE SULFATE 1 MILLIGRAM(S): 50 CAPSULE, EXTENDED RELEASE ORAL at 15:00

## 2020-11-29 RX ADMIN — MORPHINE SULFATE 1 MILLIGRAM(S): 50 CAPSULE, EXTENDED RELEASE ORAL at 14:25

## 2020-11-29 RX ADMIN — PANTOPRAZOLE SODIUM 40 MILLIGRAM(S): 20 TABLET, DELAYED RELEASE ORAL at 11:33

## 2020-11-29 RX ADMIN — Medication 50 MILLIGRAM(S): at 05:18

## 2020-11-29 RX ADMIN — MORPHINE SULFATE 1 MILLIGRAM(S): 50 CAPSULE, EXTENDED RELEASE ORAL at 05:47

## 2020-11-29 RX ADMIN — MORPHINE SULFATE 1 MILLIGRAM(S): 50 CAPSULE, EXTENDED RELEASE ORAL at 20:06

## 2020-11-29 RX ADMIN — Medication 50 MILLIGRAM(S): at 22:11

## 2020-11-29 RX ADMIN — MORPHINE SULFATE 1 MILLIGRAM(S): 50 CAPSULE, EXTENDED RELEASE ORAL at 10:00

## 2020-11-29 RX ADMIN — Medication 50 MILLIGRAM(S): at 14:41

## 2020-11-29 RX ADMIN — MORPHINE SULFATE 1 MILLIGRAM(S): 50 CAPSULE, EXTENDED RELEASE ORAL at 19:36

## 2020-11-29 RX ADMIN — MORPHINE SULFATE 1 MILLIGRAM(S): 50 CAPSULE, EXTENDED RELEASE ORAL at 05:17

## 2020-11-29 RX ADMIN — SODIUM CHLORIDE 150 MILLILITER(S): 9 INJECTION, SOLUTION INTRAVENOUS at 09:21

## 2020-11-29 RX ADMIN — TRAMADOL HYDROCHLORIDE 50 MILLIGRAM(S): 50 TABLET ORAL at 17:33

## 2020-11-29 RX ADMIN — GABAPENTIN 100 MILLIGRAM(S): 400 CAPSULE ORAL at 14:41

## 2020-11-29 RX ADMIN — GABAPENTIN 100 MILLIGRAM(S): 400 CAPSULE ORAL at 22:11

## 2020-11-29 NOTE — DISCHARGE NOTE PROVIDER - NSDCMRMEDTOKEN_GEN_ALL_CORE_FT
enalapril 20 mg oral tablet: 1 tab(s) orally once a day  Farxiga 10 mg oral tablet: 1 tab(s) orally once a day  FENOFIBRATE 145 MG TABLET: TAKE 1 TABLET BY MOUTH EVERY DAY  GABAPENTIN 100 MG CAPSULE: TAKE ONE CAPSULE BY MOUTH 3 TIMES A DAY  GLYBURIDE-METFORMIN 5-500 MG: TAKE 2 TABLETS BY MOUTH TWICE A DAY  metFORMIN 500 mg oral tablet: 1 tab(s) orally 2 times a day  rosuvastatin 5 mg oral tablet: 1 tab(s) orally once a day  VICTOZA 3-LINSEY 18 MG/3 ML PEN: INJECT 1.8MG SUBCUTANEOUSLY EVERY DAY   enalapril 20 mg oral tablet: 1 tab(s) orally once a day  Farxiga 10 mg oral tablet: 1 tab(s) orally once a day  FENOFIBRATE 145 MG TABLET: TAKE 1 TABLET BY MOUTH EVERY DAY  GABAPENTIN 100 MG CAPSULE: TAKE ONE CAPSULE BY MOUTH 3 TIMES A DAY  GLYBURIDE-METFORMIN 5-500 MG: TAKE 2 TABLETS BY MOUTH TWICE A DAY  metFORMIN 500 mg oral tablet: 1 tab(s) orally 2 times a day  rosuvastatin 5 mg oral tablet: 1 tab(s) orally once a day  Tylenol Extra Strength 500 mg oral tablet: 1 tab(s) orally every 6 hours, As Needed -for moderate pain MDD:4   VICTOZA 3-LINSEY 18 MG/3 ML PEN: INJECT 1.8MG SUBCUTANEOUSLY EVERY DAY   enalapril 20 mg oral tablet: 1 tab(s) orally once a day  FENOFIBRATE 145 MG TABLET: TAKE 1 TABLET BY MOUTH EVERY DAY  GABAPENTIN 100 MG CAPSULE: TAKE ONE CAPSULE BY MOUTH 3 TIMES A DAY  glucometer (per patient&#x27;s insurance): Test blood sugars four times a day. Dispense #1 glucometer.  lancets: 1 application subcutaneously 4 times a day   Lantus Solostar Pen 100 units/mL subcutaneous solution: 15 unit(s) subcutaneous once a day (at bedtime)   metFORMIN 500 mg oral tablet: 1 tab(s) orally 2 times a day  rosuvastatin 5 mg oral tablet: 1 tab(s) orally once a day  Tylenol Extra Strength 500 mg oral tablet: 1 tab(s) orally every 6 hours, As Needed -for moderate pain MDD:4    alcohol swabs : Apply topically to affected area 4 times a day   enalapril 20 mg oral tablet: 1 tab(s) orally once a day  GABAPENTIN 100 MG CAPSULE: TAKE ONE CAPSULE BY MOUTH 3 TIMES A DAY  glucometer (per patient&#x27;s insurance): Test blood sugars four times a day. Dispense #1 glucometer.  lancets: 1 application subcutaneously 4 times a day   Lantus Solostar Pen 100 units/mL subcutaneous solution: 15 unit(s) subcutaneous once a day (at bedtime)   metFORMIN 500 mg oral tablet: 1 tab(s) orally 2 times a day  rosuvastatin 5 mg oral tablet: 1 tab(s) orally once a day  Tylenol Extra Strength 500 mg oral tablet: 1 tab(s) orally every 6 hours, As Needed -for moderate pain MDD:4

## 2020-11-29 NOTE — DISCHARGE NOTE PROVIDER - PROVIDER TOKENS
PROVIDER:[TOKEN:[[id number]],FOLLOWUP:[2 weeks]] PROVIDER:[TOKEN:[[id number]],FOLLOWUP:[2 weeks]],PROVIDER:[TOKEN:[[id number]],FOLLOWUP:[1 week]]

## 2020-11-29 NOTE — PROGRESS NOTE ADULT - SUBJECTIVE AND OBJECTIVE BOX
Interval Events:  pt in nad  c/o mild abdominal discomfort after clear liquids     Allergies    No Known Allergies    Intolerances      Endocrine/Metabolic Medications:  atorvastatin 20 milliGRAM(s) Oral at bedtime  dextrose 40% Gel 15 Gram(s) Oral once  glucagon  Injectable 1 milliGRAM(s) IntraMuscular once  insulin lispro (ADMELOG) corrective regimen sliding scale   SubCutaneous Before meals and at bedtime      Vital Signs Last 24 Hrs  T(C): 37.3 (29 Nov 2020 05:16), Max: 37.3 (29 Nov 2020 05:16)  T(F): 99.1 (29 Nov 2020 05:16), Max: 99.1 (29 Nov 2020 05:16)  HR: 66 (29 Nov 2020 05:16) (66 - 73)  BP: 154/64 (29 Nov 2020 05:16) (141/59 - 154/65)  BP(mean): --  RR: 17 (29 Nov 2020 05:16) (17 - 18)  SpO2: 97% (29 Nov 2020 05:16) (97% - 97%)      PHYSICAL EXAM  All physical exam findings normal, except those marked:  General:	Alert, active, cooperative, NAD, well hydrated  .		[] Abnormal:  Neck		Normal: supple, no cervical adenopathy, no palpable thyroid  .		[] Abnormal:  Cardiovascular	Normal: regular rate, normal S1, S2, no murmurs  .		[] Abnormal:  Respiratory	Normal: no chest wall deformity, normal respiratory pattern, CTA B/L  .		[] Abnormal:  Abdominal	Normal: soft, ND, NT, bowel sounds present, no masses, no organomegaly  .		[] Abnormal:  		Normal normal genitalia, testes descended, circumcised/uncircumcised  .		Betty stage:			Breast betty:  .		Menstrual history:  .		[] Abnormal:  Extremities	Normal: FROM x4  .		[] Abnormal:  Skin		Normal: intact and not indurated, no rash, no acanthosis nigricans  .		[] Abnormal:  Neurologic	Normal: grossly intact  .		[] Abnormal:    LABS                        12.7   15.88 )-----------( 223      ( 29 Nov 2020 06:44 )             40.1                               137    |  99     |  11                  Calcium: 8.7   / iCa: x      (11-29 @ 06:44)    ----------------------------<  108       Magnesium: x                                3.3     |  22     |  0.44             Phosphorous: x          CAPILLARY BLOOD GLUCOSE      POCT Blood Glucose.: 104 mg/dL (29 Nov 2020 06:56)  POCT Blood Glucose.: 115 mg/dL (29 Nov 2020 00:17)  POCT Blood Glucose.: 158 mg/dL (28 Nov 2020 17:22)  POCT Blood Glucose.: 124 mg/dL (28 Nov 2020 11:50)        Assesment/plan    Patient is a 59 year old male Nauruan speaking from home live with wife walk independantly with a PMH of HTN, HLD, DM, NIKKI, h/o Left Hydrocelectomy on 7/21/2020  who presented with a chief complaint of abdominal pain. Found to have acute pancreatitis and elevated A1c. Pt takes victoza and oral dm meds as out pt.            Problem/Recommendation - 1:  Problem: Diabetes mellitus. Recommendation: poorly controlled as out pt  currently decent as pt NPO  D/C VICTOZA out pt meds- due to pancreatitis  adjust meds when po intake resumes  consider basal insulin and oral dm meds upon d/c  d/w pts wife over the phone.     Problem/Recommendation - 2:  ·  Problem: Pancreatitis, acute: on clear liquid diet  per prim team.

## 2020-11-29 NOTE — PROGRESS NOTE ADULT - ASSESSMENT
Patient is 59M, [place]an speaking, lives at home with his wife, walks independently with a PMHx of HTN, HLD, DM, NIKKI, h/o Left Hydrocelectomy on 7/21/2020 who presented with a chief complaint of severe abdominal pain x 1 day; found to have elevated lipase and acute interstitial pancreatitis on CT, likely drug induced vs autoimmune.

## 2020-11-29 NOTE — DISCHARGE NOTE PROVIDER - CARE PROVIDERS DIRECT ADDRESSES
,DirectAddress_Unknown ,DirectAddress_Unknown,[email or web address]

## 2020-11-29 NOTE — PROGRESS NOTE ADULT - PROBLEM SELECTOR PLAN 1
- Pt p/w abdominal pain and elevated lipase 11,046 -> downtrending  - CT Abdomen/Pelvis with IV contrast identified findings compatible with acute interstitial pancreatitis. No peripancreatic abscess.  Mild gallbladder wall edema.  - RUQ US shows biliary sludge, no evidence of GS, biliary dilatation   - HIDA Scan done, with 4mg morphine, awaiting results   - BISAP Score= 2 (BUN>25, Age>60), [place] score: 3  - COLE<3, UDS +'ve for opiates   - Amylase elevated (529)   - Lipid Panel: Wnl, T, pt on statin and fenofibrate at home  - ESR 6, CRP elevated 4.68   - Holding autoimmune workup for now: IgG4, RF, HUGO, antismooth muscle antibodies, until more common etiologies ruled out   - NPO (except meds), to advance diet as tolerated within 24 hours  - Dilaudid 0.5mg Q6h, Morphine 1mg Q4H PRN for pain management  - C/w Lactated Ringer at a rate of 150mL/h (for now)  - Strict I&O's with a goal urine output of 1cc/kg/hour  - Close monitoring and correction of electrolyte derangements  - As patient WBC I trending up to 27, and pain is worsening, started zosyn.  - Vital Signs monitoring  - GI Dr. Hill - Pt p/w abdominal pain and elevated lipase 11,046 -> downtrending  - CT Abdomen/Pelvis with IV contrast identified findings compatible with acute interstitial pancreatitis. No peripancreatic abscess.  Mild gallbladder wall edema.  - RUQ US shows biliary sludge, no evidence of GS, biliary dilatation   - HIDA Scan done, with 4mg morphine, awaiting results   - BISAP Score= 2 (BUN>25, Age>60), [place] score: 3  - COLE<3, UDS +'ve for opiates   - Amylase elevated (529)   - Lipid Panel: Wnl, T, pt on statin and fenofibrate at home  - ESR 6, CRP elevated 4.68   - Holding autoimmune workup for now: IgG4, RF, HUGO, antismooth muscle antibodies, until more common etiologies ruled out   - Clears, to advance diet as tolerated   - Morphine 1mg Q4H PRN for pain management  - C/w Lactated Ringer at a rate of 150mL/h (for now)  - Strict I&O's with a goal urine output of 1cc/kg/hour  - Close monitoring and correction of electrolyte derangements  - zosyn D/c'ed , WBC trending down  - Vital Signs monitoring  - GI Dr. Hill

## 2020-11-29 NOTE — PROGRESS NOTE ADULT - PROBLEM SELECTOR PLAN 3
- Pt taking farxiga, metformin-glyburide BD, victoza at home, holding all as NPO  - farxiga has case reports of DIP  - Hemoglobin A1c 9.2  - Blood Glucose Monitoring Q6h while NPO  - Sliding Scale Q6h  - NPO (except meds) will attempt reinitiation of diet as tolerated within 24 hours    - Endocrinology f/u outpatient - Pt taking farxiga, metformin-glyburide BD, victoza at home, holding all as NPO  - farxiga has case reports of DIP  - Hemoglobin A1c 9.2  - Blood Glucose Monitoring Q6h while NPO  - Sliding Scale Q6h  - Endo consult Dr. Serrano   - To start insulin on discharge with f/u   - NPO (except meds) will attempt reinitiation of diet as tolerated within 24 hours    - Endocrinology f/u outpatient

## 2020-11-29 NOTE — PROGRESS NOTE ADULT - PROBLEM SELECTOR PLAN 2
- Pt has wbc uptrending 17k -> 27k, no fevers, abdominal pain status quo  SIRS Criteria= 1 (WBC>12,000) + no definitive source of infection (?Gallbladder)  - F/u Blood cultures   - C/w Zosyn 3.375gm for now   - ESR 6, CRP elevated 4.68, Procalcitonin 0.27  - f/u HIDA as RUQ US is equivocal  - monitor vitals for now - Pt has wbc donwtrending 17k -> 27 -> 21, no fevers, abdominal pain status quo  SIRS Criteria= 1 (WBC>12,000) + no definitive source of infection   - Blood cultures prelim negative   - Zosyn 3.375gmD/c'ed  - ESR 6, CRP elevated 4.68, Procalcitonin 0.27  - RUQ US is equivocal  - HIDA  negative for cholecystitis   - monitor vitals for now

## 2020-11-29 NOTE — DISCHARGE NOTE PROVIDER - NSDCCPCAREPLAN_GEN_ALL_CORE_FT
PRINCIPAL DISCHARGE DIAGNOSIS  Diagnosis: Pancreatitis, acute  Assessment and Plan of Treatment: You were found to have signs and symptoms of pancreatitis, and were investigated with imaging including a CT scan of the abdomen which showed some thickening of the Gall bladder. You had an ultrasound of the liver and gall bladder which did not show any current stones. We believe certain medications you were taking at home including FENOFIBRATE and ENALAPRIL may have caused your pancreatitis. Gallstones are the most common cause of pancreatitis, and you need to avoid fatty foods, reduce your weight, and cholesterol to reduce your risks for recurrence. You were managed with fluids, and pain control. Your diet was advanced slowly and you were monitored until your pain resolved and you could eat without nausea or vomiting.        SECONDARY DISCHARGE DIAGNOSES  Diagnosis: Leukocytosis  Assessment and Plan of Treatment: You were found to have a high white blood cell count which is usually due to an infection. You did not have any fevers, high heart rate, or low blood pressure which would indicate an underlying serious source. You were tested for possible sources but blood and urine tests were negative for any organism. While these were testing, you were started on an antibiotic called ZOSYN and this was discontinued after all tests were negative.    Diagnosis: Hyperlipidemia  Assessment and Plan of Treatment: You have a history of high blood fats and were on two medications at home including a STATIN and FENOFIBRATE. You were continued on the statin, but fenofibrate was stopped due to its risk of causing pancreatitis. Your blood tests showed well controlled lipid levels in your body. You terrell advised to continue taking this medication daily and seek medical attention if you have intense muscle aches, or reddish discoloration of the urine.    Diagnosis: Hypertension  Assessment and Plan of Treatment: You have high blood pressure, for which you have been started on medications. Your ENALAPRIL was stopped and you were started on HYDRALAZINE. It is important to continue to take your medications on time,  monitor your blood pressure at home, keep a log of your home readings and follow up with your Primary Care Physician. As per AHA/ACC guidelines, it is important to adhere to a DASH Diet of fresh fruits, vegetables, lean meats such as poultry and fish, and whole wheat carbs. 30 minutes of aerobic exercise per day 3-4 times a week, reducing salt intake <1.5g/day, and cutting down on highly processed foods are also shown to reduce BP. Uncontrolled BP may result in organ damage to your eyes, brain, heart, and kidneys by causing strokes, heart attacks, kidney failure, and bleeds in your eye.      Diagnosis: Diabetes mellitus  Assessment and Plan of Treatment: You have Type 2 diabetes and were found to have a Hemoglobin A1c of 9.2% which shows poorly controlled glucose levels in your body over the past 3 months. A normal HbA1c is 5.5%, and you must make some lifestyle choices such as exercise and weight loss in order to make your body less insulin resistant. You are advised to eat foods that are low glycemic index which include beans and complex carbs, and to avoid high GI foods including white rice and potatoes. Uncontrolled sugars can lead to blindness, kidney failure, and contribute to diseases such as heart attacks and strokes. Your were not started on any medications at this hospital visit, and you are advised to keep your Primary Care Physician appointments in order to monitor and change medications as necessary.      Diagnosis: Renal cyst  Assessment and Plan of Treatment: You were found incidentally to have cysts in your right kidney on abdominal CT scan which are too small to characterize. You are advised to have follow up with an outpatient nephrologist to keep monitoring the progression of these cysts.     PRINCIPAL DISCHARGE DIAGNOSIS  Diagnosis: Acute cholecystitis  Assessment and Plan of Treatment: Ultrasound showed evidence of gallbladder sludge, underwent laparoscopic cholecystectomy 12/2. May shower. Remove dressing in 48 hrs. Leave steristrips intact. Resume regular diet. No heavy lifting, straining, exercises for 2 weeks.  .Please follow-up with your surgeon in 1 week. Drink plenty of fluids and rest as needed. Call for any fever over 101, nausea, vomiting, severe pain, no passing of gas or bowel movement.   DIET  You may resume your regular diet as normal.   SURGICAL SITES  Remove outer dressing and keep white steri-strips in place allowing them to fall off on their own. You may shower 48 hours post-operatively but do not bathe or soak in the water for 1-2 weeks; pat dry. If you notice any signs of surgical site infection (ie. redness, swelling, pain, pus drainage), please seek medical care immediately.   ACTIVITY  Do not lift anything heavier than 10 pounds for 2 weeks and avoid strenuous activity for 4-6 weeks.   PAIN CONTROL  You may take Motrin 600mg (with food) or Tylenol 650mg as needed for mild pain. Stagger one medication 3 hours after the other for maximum pain control. Maximum daily dose of Tylenol should not exceed 4grams/day.         SECONDARY DISCHARGE DIAGNOSES  Diagnosis: Pancreatitis, acute  Assessment and Plan of Treatment: You were found to have signs and symptoms of pancreatitis, and were investigated with imaging including a CT scan of the abdomen which showed some thickening of the Gall bladder. You had an ultrasound of the liver and gall bladder which did not show any current stones. We believe certain medications you were taking at home including FENOFIBRATE and ENALAPRIL may have caused your pancreatitis. Gallstones are the most common cause of pancreatitis, and you need to avoid fatty foods, reduce your weight, and cholesterol to reduce your risks for recurrence. You were managed with fluids, and pain control. Your diet was advanced slowly and you were monitored until your pain resolved and you could eat without nausea or vomiting.      Diagnosis: Leukocytosis  Assessment and Plan of Treatment: You were found to have a high white blood cell count which is usually due to an infection. You did not have any fevers, high heart rate, or low blood pressure which would indicate an underlying serious source. You were tested for possible sources but blood and urine tests were negative for any organism. While these were testing, you were started on an antibiotic called ZOSYN and this was discontinued after all tests were negative.    Diagnosis: Diabetes mellitus  Assessment and Plan of Treatment: You have Type 2 diabetes and were found to have a Hemoglobin A1c of 9.2% which shows poorly controlled glucose levels in your body over the past 3 months. A normal HbA1c is 5.5%, and you must make some lifestyle choices such as exercise and weight loss in order to make your body less insulin resistant. You are advised to eat foods that are low glycemic index which include beans and complex carbs, and to avoid high GI foods including white rice and potatoes. Uncontrolled sugars can lead to blindness, kidney failure, and contribute to diseases such as heart attacks and strokes. Your were not started on any medications at this hospital visit, and you are advised to keep your Primary Care Physician appointments in order to monitor and change medications as necessary.      Diagnosis: Hypertension  Assessment and Plan of Treatment: You have high blood pressure, for which you have been started on medications. Your ENALAPRIL was stopped and you were started on HYDRALAZINE. It is important to continue to take your medications on time,  monitor your blood pressure at home, keep a log of your home readings and follow up with your Primary Care Physician. As per AHA/ACC guidelines, it is important to adhere to a DASH Diet of fresh fruits, vegetables, lean meats such as poultry and fish, and whole wheat carbs. 30 minutes of aerobic exercise per day 3-4 times a week, reducing salt intake <1.5g/day, and cutting down on highly processed foods are also shown to reduce BP. Uncontrolled BP may result in organ damage to your eyes, brain, heart, and kidneys by causing strokes, heart attacks, kidney failure, and bleeds in your eye.      Diagnosis: Hyperlipidemia  Assessment and Plan of Treatment: You have a history of high blood fats and were on two medications at home including a STATIN and FENOFIBRATE. You were continued on the statin, but fenofibrate was stopped due to its risk of causing pancreatitis. Your blood tests showed well controlled lipid levels in your body. You terrell advised to continue taking this medication daily and seek medical attention if you have intense muscle aches, or reddish discoloration of the urine.    Diagnosis: Renal cyst  Assessment and Plan of Treatment: You were found incidentally to have cysts in your right kidney on abdominal CT scan which are too small to characterize. You are advised to have follow up with an outpatient nephrologist to keep monitoring the progression of these cysts.     PRINCIPAL DISCHARGE DIAGNOSIS  Diagnosis: Acute cholecystitis  Assessment and Plan of Treatment: Ultrasound showed evidence of gallbladder sludge, underwent laparoscopic cholecystectomy 12/2. May shower. Remove dressing in 48 hrs. Leave steristrips intact. Resume regular diet. No heavy lifting, straining, exercises for 2 weeks.  Please follow-up with your surgeon in 1 week. Drink plenty of fluids and rest as needed. Call for any fever over 101, nausea, vomiting, severe pain, no passing of gas or bowel movement.   DIET  You may resume your regular diet as normal.   SURGICAL SITES  Remove outer dressing and keep white steri-strips in place allowing them to fall off on their own. You may shower 48 hours post-operatively but do not bathe or soak in the water for 1-2 weeks; pat dry. If you notice any signs of surgical site infection (ie. redness, swelling, pain, pus drainage), please seek medical care immediately.   ACTIVITY  Do not lift anything heavier than 10 pounds for 2 weeks and avoid strenuous activity for 4-6 weeks.   PAIN CONTROL  You may take Motrin 600mg (with food) or Tylenol 650mg as needed for mild pain. Stagger one medication 3 hours after the other for maximum pain control. Maximum daily dose of Tylenol should not exceed 4grams/day.         SECONDARY DISCHARGE DIAGNOSES  Diagnosis: Pancreatitis, acute  Assessment and Plan of Treatment: You were found to have signs and symptoms of pancreatitis, and were investigated with imaging including a CT scan of the abdomen which showed some thickening of the Gall bladder. You had an ultrasound of the liver and gall bladder which did not show any current stones.  We believe certain medications you were taking at home including VICTOZA may have caused your pancreatitis versus Gall bladder "sludge" which was noticed on your Ultrasound for which you were planned for Gall bladder removal surgery. Gallstones are the most common cause of pancreatitis, and you need to avoid fatty foods, reduce your weight, and cholesterol to reduce your risks for recurrence. You were managed with fluids, and pain control. Your diet was advanced slowly and you were monitored until your pain resolved and you could eat without nausea or vomiting.      Diagnosis: Leukocytosis  Assessment and Plan of Treatment: You were found to have a high white blood cell count which is usually due to an infection. You did not have any fevers, high heart rate, or low blood pressure which would indicate an underlying serious source. You were tested for possible sources but blood and urine tests were negative for any organism. While these were testing, you were started on an antibiotic called ZOSYN and this was discontinued after all tests were negative.    Diagnosis: Diabetes mellitus  Assessment and Plan of Treatment: You have Type 2 diabetes and were found to have a Hemoglobin A1c of 9.2% which shows poorly controlled glucose levels in your body over the past 3 months. A normal HbA1c is 5.5%, and you must make some lifestyle choices such as exercise and weight loss in order to make your body less insulin resistant. You are advised to eat foods that are low glycemic index which include beans and complex carbs, and to avoid high GI foods including white rice and potatoes. Uncontrolled sugars can lead to blindness, kidney failure, and contribute to diseases such as heart attacks and strokes. Your were not started on any medications at this hospital visit, and you are advised to keep your Primary Care Physician appointments in order to monitor and change medications as necessary. Please continue with 15 UNITS LANTUS at bedtime and METFORMIN 500 mg twice daily as per Endocrinologist's recommendations and follow up with your primary care provider to check your blood sugar levels.      Diagnosis: Hypertension  Assessment and Plan of Treatment: You have high blood pressure, for which you have been started on medications. Your ENALAPRIL was stopped and you were started on HYDRALAZINE initially due to the suspicion that Enalapril could have contributed to pancreatitis. It is important to continue to take your medications on time,  monitor your blood pressure at home, keep a log of your home readings and follow up with your Primary Care Physician. As per AHA/ACC guidelines, it is important to adhere to a DASH Diet of fresh fruits, vegetables, lean meats such as poultry and fish, and whole wheat carbs. 30 minutes of aerobic exercise per day 3-4 times a week, reducing salt intake <1.5g/day, and cutting down on highly processed foods are also shown to reduce BP. Uncontrolled BP may result in organ damage to your eyes, brain, heart, and kidneys by causing strokes, heart attacks, kidney failure, and bleeds in your eye. Please continue taking ONLY ENALAPRIL 20 mg upon discharge and follow up with your outpatient primary care provider within 1 week of hospital discharge.      Diagnosis: Hyperlipidemia  Assessment and Plan of Treatment: You have a history of high blood fats and were on two medications at home including a STATIN and FENOFIBRATE. You were continued on the statin, but Fenofibrate was STOPPED due to its risk of causing pancreatitis. Your blood tests showed well controlled lipid levels in your body. You are advised to continue taking this medication daily and seek medical attention if you have intense muscle aches, or reddish discoloration of the urine. Please follow up with your outpatient primary care provider in order to decide whether to resume Fenofibrate.    Diagnosis: Renal cyst  Assessment and Plan of Treatment: You were found incidentally to have cysts in your right kidney on abdominal CT scan which are too small to characterize. You are advised to have follow up with an outpatient nephrologist to keep monitoring the progression of these cysts.     PRINCIPAL DISCHARGE DIAGNOSIS  Diagnosis: Gallstone pancreatitis  Assessment and Plan of Treatment: Ultrasound showed evidence of gallbladder sludge, underwent laparoscopic cholecystectomy 12/2. May shower. Remove dressing in 48 hrs. Leave steristrips intact. Resume regular diet. No heavy lifting, straining, exercises for 2 weeks.  Please follow-up with your surgeon in 1 week. Drink plenty of fluids and rest as needed. Call for any fever over 101, nausea, vomiting, severe pain, no passing of gas or bowel movement.   DIET  You may resume your regular diet as normal.   SURGICAL SITES  Remove outer dressing and keep white steri-strips in place allowing them to fall off on their own. You may shower 48 hours post-operatively but do not bathe or soak in the water for 1-2 weeks; pat dry. If you notice any signs of surgical site infection (ie. redness, swelling, pain, pus drainage), please seek medical care immediately.   ACTIVITY  Do not lift anything heavier than 10 pounds for 2 weeks and avoid strenuous activity for 4-6 weeks.   PAIN CONTROL  You may take Motrin 600mg (with food) or Tylenol 650mg as needed for mild pain. Stagger one medication 3 hours after the other for maximum pain control. Maximum daily dose of Tylenol should not exceed 4grams/day.         SECONDARY DISCHARGE DIAGNOSES  Diagnosis: Pancreatitis, acute  Assessment and Plan of Treatment: You were found to have signs and symptoms of pancreatitis, and were investigated with imaging including a CT scan of the abdomen which showed some thickening of the Gall Bladder. You had an ultrasound of the liver and gall bladder which did not show any stones but did show evidence of Gallbladder sludge.  We believe certain medications you were taking at home including VICTOZA may have added to the pancreatitis. Gallstones are the most common cause of pancreatitis, and you need to avoid fatty foods, reduce your weight, and cholesterol to reduce your risks for recurrence. You were managed with fluids, and pain control. Your diet was advanced slowly and you were monitored until your pain resolved and you could eat without nausea or vomiting.  General Surgery was consulted and you underwent Laparoscopic Cholecystectomy.       Diagnosis: Leukocytosis  Assessment and Plan of Treatment: You were found to have a high white blood cell count which is usually due to an infection. You did not have any fevers, high heart rate, or low blood pressure which would indicate an underlying serious source. You were tested for possible sources but blood and urine tests were negative for any organism. While these were testing, you were started on an antibiotic called ZOSYN and this was discontinued after all tests were negative. You was seen by Infectious Diseases Dr. Tamez.    Diagnosis: Diabetes mellitus  Assessment and Plan of Treatment: You have Type 2 Diabetes and were found to have a Hemoglobin A1c of 9.2% which shows poorly controlled glucose levels in your body over the past 3 months. A normal HbA1c is 5.5%, and you must make some lifestyle choices such as exercise and weight loss in order to make your body less insulin resistant. You are advised to eat foods that are low glycemic index which include beans and complex carbs, and to avoid high GI foods including white rice and potatoes. Uncontrolled sugars can lead to blindness, kidney failure, and contribute to diseases such as heart attacks and strokes. Your were not started on any medications at this hospital visit, and you are advised to keep your Primary Care Physician appointments in order to monitor and change medications as necessary. You have demonstrated competency in injecting Insulin. Please continue with 15 UNITS LANTUS at bedtime and METFORMIN 500 mg twice daily as per Endocrinologist's recommendations and follow up with your primary care provider to check your blood sugar levels.      Diagnosis: Hypertension  Assessment and Plan of Treatment: You have high blood pressure, for which you have been started on medications. Your ENALAPRIL was stopped and you were started on HYDRALAZINE initially due to the suspicion that Enalapril could have contributed to pancreatitis. It is important to continue to take your medications on time,  monitor your blood pressure at home, keep a log of your home readings and follow up with your Primary Care Physician. As per AHA/ACC guidelines, it is important to adhere to a DASH Diet of fresh fruits, vegetables, lean meats such as poultry and fish, and whole wheat carbs. 30 minutes of aerobic exercise per day 3-4 times a week, reducing salt intake <1.5g/day, and cutting down on highly processed foods are also shown to reduce BP. Uncontrolled BP may result in organ damage to your eyes, brain, heart, and kidneys by causing strokes, heart attacks, kidney failure, and bleeds in your eye. Please continue taking ONLY ENALAPRIL 20 mg upon discharge and follow up with your outpatient primary care provider within 1 week of hospital discharge.  Your target Blood Pressure should be less than 140/80 mm Hg. If Blood pressure elevated may increase Enalapril or add Hydralazine.    Diagnosis: Hyperlipidemia  Assessment and Plan of Treatment: You have a history of high blood fats and were on two medications at home including a STATIN and FENOFIBRATE. You were continued on the statin, but Fenofibrate was STOPPED due to its risk of causing pancreatitis. Your blood tests showed well controlled lipid levels in your body. You are advised to continue taking this medication daily and seek medical attention if you have intense muscle aches, or reddish discoloration of the urine. Please follow up with your outpatient primary care provider in order to decide whether to resume Fenofibrate.    Diagnosis: Renal cyst  Assessment and Plan of Treatment: You were found incidentally to have cysts in your right kidney on abdominal CT scan which are too small to characterize. You are advised to have follow up with an outpatient nephrologist to keep monitoring the progression of these cysts.

## 2020-11-29 NOTE — PROGRESS NOTE ADULT - SUBJECTIVE AND OBJECTIVE BOX
PGY-1 Progress Note discussed with attending    PAGER #: [[phone]] TILL 5:00 PM  PLEASE CONTACT ON CALL TEAM:  - On Call Team (Please refer to Melodie) FROM 5:00 PM - 8:30PM  - Nightfloat Team FROM 8:30 -7:30 AM    CHIEF COMPLAINT & BRIEF HOSPITAL COURSE:  Patient is 59M, Tristanian speaking, lives at home with his wife, walks independently with a PMHx of HTN, HLD, DM, NIKKI, h/o Left Hydrocelectomy on 7/21/2020 who presented with a chief complaint of severe abdominal pain x 1 day. Patient states that it began around 20:00 on 11/24, after eating a dinner of steak and tomatoes. He endorsed severe 9/10 pain, constant, located mostly in the epigastric area radiating to flanks, and associated with nausea, and vomiting. He denies prior episodes of these symptoms, any recent alcohol intake, drug or smoking. He was found to have Ct findings consistent with acute interstitial pancreatitis, BISAP 2, Jank[place]'s 3. RUQ US was equivocal for gallstones, and HIDA scan negative for cholecystitis/lithiasis. Currently on aggressive IV hydration, and pain control; s/p Zosyn for worsening wbc count, d/c'ed 11/28. Blood cultures negative till date.     INTERVAL HPI/OVERNIGHT EVENTS:   Patient was examined while he was lying in bed, Aox3, responding appropriately to questions, in NAD. He has normal bowel and bladder movements, and endorses abdominal pain, nausea.    REVIEW OF SYSTEMS:  CONSTITUTIONAL: Fatigue + No fever, weight loss  RESPIRATORY: No cough, wheezing, chills or hemoptysis; No shortness of breath  CARDIOVASCULAR: No chest pain, palpitations, dizziness, or leg swelling  GASTROINTESTINAL: Abdominal Pain +, Nausea + No vomiting, or hematemesis; No diarrhea or constipation. No melena or hematochezia.  GENITOURINARY: No dysuria or hematuria, urinary frequency  NEUROLOGICAL: No headaches, memory loss, loss of strength, numbness, or tremors  SKIN: No itching, burning, rashes, or lesions     MEDICATIONS  (STANDING):  atorvastatin 20 milliGRAM(s) Oral at bedtime  dextrose 40% Gel 15 Gram(s) Oral once  enoxaparin Injectable 40 milliGRAM(s) SubCutaneous daily  gabapentin 100 milliGRAM(s) Oral three times a day  glucagon  Injectable 1 milliGRAM(s) IntraMuscular once  hydrALAZINE 50 milliGRAM(s) Oral three times a day  influenza   Vaccine 0.5 milliLiter(s) IntraMuscular once  insulin lispro (ADMELOG) corrective regimen sliding scale   SubCutaneous every 6 hours  lactated ringers. 1000 milliLiter(s) (150 mL/Hr) IV Continuous <Continuous>  lactated ringers. 1000 milliLiter(s) (150 mL/Hr) IV Continuous <Continuous>  pantoprazole  Injectable 40 milliGRAM(s) IV Push daily    MEDICATIONS  (PRN):  morphine  - Injectable 1 milliGRAM(s) IV Push every 4 hours PRN Severe Pain (7 - 10)  ondansetron Injectable 4 milliGRAM(s) IV Push every 4 hours PRN Nausea and/or Vomiting      Vital Signs Last 24 Hrs  T(C): 36.4 (28 Nov 2020 20:45), Max: 36.7 (28 Nov 2020 14:42)  T(F): 97.5 (28 Nov 2020 20:45), Max: 98 (28 Nov 2020 14:42)  HR: 66 (28 Nov 2020 20:45) (66 - 80)  BP: 154/65 (28 Nov 2020 20:45) (141/59 - 154/65)  BP(mean): --  RR: 18 (28 Nov 2020 20:45) (17 - 18)  SpO2: 97% (28 Nov 2020 20:45) (97% - 97%)    PHYSICAL EXAMINATION:  GENERAL: Well built Hungarian man, appears stated age, obese, in some pain   HEAD:  Atraumatic, Normocephalic  EYES:  conjunctiva and sclera clear  NECK: Supple, No JVD, Normal thyroid  CHEST/LUNG: Clear to auscultation. Clear to percussion bilaterally; No rales, rhonchi, wheezing, or rubs  HEART: Regular rate and rhythm; No murmurs, rubs, or gallops  ABDOMEN: Soft, tender in epigastrium, RUQ, distended with fat, kim's sign +, no rebound tenderness; Bowel sounds present  NERVOUS SYSTEM:  Alert & Oriented X3, no motor or sensory loss   EXTREMITIES:  2+ Peripheral Pulses, No clubbing, cyanosis, or edema  SKIN: warm dry                          13.4   21.00 )-----------( 193      ( 28 Nov 2020 07:29 )             42.9     11-28    136  |  98  |  14  ----------------------------<  103<H>  3.5   |  23  |  0.52    Ca    8.7      28 Nov 2020 07:29    TPro  6.2  /  Alb  2.4<L>  /  TBili  0.9  /  DBili  x   /  AST  16  /  ALT  9<L>  /  AlkPhos  51  11-27    LIVER FUNCTIONS - ( 27 Nov 2020 07:10 )  Alb: 2.4 g/dL / Pro: 6.2 g/dL / ALK PHOS: 51 U/L / ALT: 9 U/L DA / AST: 16 U/L / GGT: x                   CAPILLARY BLOOD GLUCOSE      RADIOLOGY & ADDITIONAL TESTS:                   PGY-1 Progress Note discussed with attending    PAGER #: [[phone]] TILL 5:00 PM  PLEASE CONTACT ON CALL TEAM:  - On Call Team (Please refer to Melodie) FROM 5:00 PM - 8:30PM  - Nightfloat Team FROM 8:30 -7:30 AM    CHIEF COMPLAINT & BRIEF HOSPITAL COURSE:  Patient is 59M, Welsh speaking, lives at home with his wife, walks independently with a PMHx of HTN, HLD, DM, NIKKI, h/o Left Hydrocelectomy on 7/21/2020 who presented with a chief complaint of severe abdominal pain x 1 day. Patient states that it began around 20:00 on 11/24, after eating a dinner of steak and tomatoes. He endorsed severe 9/10 pain, constant, located mostly in the epigastric area radiating to flanks, and associated with nausea, and vomiting. He denies prior episodes of these symptoms, any recent alcohol intake, drug or smoking. He was found to have Ct findings consistent with acute interstitial pancreatitis, BISAP 2, Janki's 3. RUQ US was equivocal for gallstones, and HIDA scan negative for cholecystitis/lithiasis. Currently on aggressive IV hydration, and pain control; s/p Zosyn for worsening wbc count, d/c'ed 11/28. Blood cultures negative till date.     INTERVAL HPI/OVERNIGHT EVENTS:   Patient was examined while he was lying in bed, Aox3, responding appropriately to questions, in NAD. He has normal bowel and bladder movements, and endorses abdominal pain status quo in between pain medications and, nausea (no vomiting).    REVIEW OF SYSTEMS:  CONSTITUTIONAL: Fatigue + No fever, weight loss  RESPIRATORY: No cough, wheezing, chills or hemoptysis; No shortness of breath  CARDIOVASCULAR: No chest pain, palpitations, dizziness, or leg swelling  GASTROINTESTINAL: Abdominal Pain +, Nausea + No vomiting, or hematemesis; No diarrhea or constipation. No melena or hematochezia.  GENITOURINARY: No dysuria or hematuria, urinary frequency  NEUROLOGICAL: No headaches, memory loss, loss of strength, numbness, or tremors  SKIN: No itching, burning, rashes, or lesions     MEDICATIONS  (STANDING):  atorvastatin 20 milliGRAM(s) Oral at bedtime  dextrose 40% Gel 15 Gram(s) Oral once  enoxaparin Injectable 40 milliGRAM(s) SubCutaneous daily  gabapentin 100 milliGRAM(s) Oral three times a day  glucagon  Injectable 1 milliGRAM(s) IntraMuscular once  hydrALAZINE 50 milliGRAM(s) Oral three times a day  influenza   Vaccine 0.5 milliLiter(s) IntraMuscular once  insulin lispro (ADMELOG) corrective regimen sliding scale   SubCutaneous every 6 hours  lactated ringers. 1000 milliLiter(s) (150 mL/Hr) IV Continuous <Continuous>  lactated ringers. 1000 milliLiter(s) (150 mL/Hr) IV Continuous <Continuous>  pantoprazole  Injectable 40 milliGRAM(s) IV Push daily    MEDICATIONS  (PRN):  morphine  - Injectable 1 milliGRAM(s) IV Push every 4 hours PRN Severe Pain (7 - 10)  ondansetron Injectable 4 milliGRAM(s) IV Push every 4 hours PRN Nausea and/or Vomiting      Vital Signs Last 24 Hrs  T(C): 36.4 (28 Nov 2020 20:45), Max: 36.7 (28 Nov 2020 14:42)  T(F): 97.5 (28 Nov 2020 20:45), Max: 98 (28 Nov 2020 14:42)  HR: 66 (28 Nov 2020 20:45) (66 - 80)  BP: 154/65 (28 Nov 2020 20:45) (141/59 - 154/65)  BP(mean): --  RR: 18 (28 Nov 2020 20:45) (17 - 18)  SpO2: 97% (28 Nov 2020 20:45) (97% - 97%)    PHYSICAL EXAMINATION:  GENERAL: Well built Hong Konger man, appears stated age, obese, in some pain   HEAD:  Atraumatic, Normocephalic  EYES:  conjunctiva and sclera clear  NECK: Supple, No JVD, Normal thyroid  CHEST/LUNG: Clear to auscultation. Clear to percussion bilaterally; No rales, rhonchi, wheezing, or rubs  HEART: Regular rate and rhythm; No murmurs, rubs, or gallops  ABDOMEN: Soft, tender in epigastrium, RUQ, distended with fat, kim's sign +, no rebound tenderness; Bowel sounds present  NERVOUS SYSTEM:  Alert & Oriented X3, no motor or sensory loss   EXTREMITIES:  2+ Peripheral Pulses, No clubbing, cyanosis, or edema  SKIN: warm dry                          13.4   21.00 )-----------( 193      ( 28 Nov 2020 07:29 )             42.9     11-28    136  |  98  |  14  ----------------------------<  103<H>  3.5   |  23  |  0.52    Ca    8.7      28 Nov 2020 07:29    TPro  6.2  /  Alb  2.4<L>  /  TBili  0.9  /  DBili  x   /  AST  16  /  ALT  9<L>  /  AlkPhos  51  11-27    LIVER FUNCTIONS - ( 27 Nov 2020 07:10 )  Alb: 2.4 g/dL / Pro: 6.2 g/dL / ALK PHOS: 51 U/L / ALT: 9 U/L DA / AST: 16 U/L / GGT: x                   CAPILLARY BLOOD GLUCOSE      RADIOLOGY & ADDITIONAL TESTS:                   PGY-1 Progress Note discussed with attending    PAGER #: [[phone]] TILL 5:00 PM  PLEASE CONTACT ON CALL TEAM:  - On Call Team (Please refer to Melodie) FROM 5:00 PM - 8:30PM  - Nightfloat Team FROM 8:30 -7:30 AM    CHIEF COMPLAINT & BRIEF HOSPITAL COURSE:  Patient is 59M, Dutch speaking, lives at home with his wife, walks independently with a PMHx of HTN, HLD, DM, NIKKI, h/o Left Hydrocelectomy on 7/21/2020 who presented with a chief complaint of severe abdominal pain x 1 day. Patient states that it began around 20:00 on 11/24, after eating a dinner of steak and tomatoes. He endorsed severe 9/10 pain, constant, located mostly in the epigastric area radiating to flanks, and associated with nausea, and vomiting. He denies prior episodes of these symptoms, any recent alcohol intake, drug or smoking. He was found to have Ct findings consistent with acute interstitial pancreatitis, BISAP 2, Janki's 3. RUQ US was equivocal for gallstones, and HIDA scan negative for cholecystitis/lithiasis. Currently on aggressive IV hydration, and pain control; s/p Zosyn for worsening wbc count, d/c'ed 11/28. Blood cultures negative till date.     INTERVAL HPI/OVERNIGHT EVENTS:   [place]  ID: [id number]  Patient was examined while he was lying in bed, Aox3, responding appropriately to questions, in NAD. He has normal bowel and bladder movements, and endorses abdominal pain status quo in between pain medications and, nausea (no vomiting).    REVIEW OF SYSTEMS:  CONSTITUTIONAL: Fatigue + No fever, weight loss  RESPIRATORY: No cough, wheezing, chills or hemoptysis; No shortness of breath  CARDIOVASCULAR: No chest pain, palpitations, dizziness, or leg swelling  GASTROINTESTINAL: Abdominal Pain +, Nausea + No vomiting, or hematemesis; No diarrhea or constipation. No melena or hematochezia.  GENITOURINARY: No dysuria or hematuria, urinary frequency  NEUROLOGICAL: No headaches, memory loss, loss of strength, numbness, or tremors  SKIN: No itching, burning, rashes, or lesions     MEDICATIONS  (STANDING):  atorvastatin 20 milliGRAM(s) Oral at bedtime  dextrose 40% Gel 15 Gram(s) Oral once  enoxaparin Injectable 40 milliGRAM(s) SubCutaneous daily  gabapentin 100 milliGRAM(s) Oral three times a day  glucagon  Injectable 1 milliGRAM(s) IntraMuscular once  hydrALAZINE 50 milliGRAM(s) Oral three times a day  influenza   Vaccine 0.5 milliLiter(s) IntraMuscular once  insulin lispro (ADMELOG) corrective regimen sliding scale   SubCutaneous every 6 hours  lactated ringers. 1000 milliLiter(s) (150 mL/Hr) IV Continuous <Continuous>  lactated ringers. 1000 milliLiter(s) (150 mL/Hr) IV Continuous <Continuous>  pantoprazole  Injectable 40 milliGRAM(s) IV Push daily    MEDICATIONS  (PRN):  morphine  - Injectable 1 milliGRAM(s) IV Push every 4 hours PRN Severe Pain (7 - 10)  ondansetron Injectable 4 milliGRAM(s) IV Push every 4 hours PRN Nausea and/or Vomiting      Vital Signs Last 24 Hrs  T(C): 36.4 (28 Nov 2020 20:45), Max: 36.7 (28 Nov 2020 14:42)  T(F): 97.5 (28 Nov 2020 20:45), Max: 98 (28 Nov 2020 14:42)  HR: 66 (28 Nov 2020 20:45) (66 - 80)  BP: 154/65 (28 Nov 2020 20:45) (141/59 - 154/65)  BP(mean): --  RR: 18 (28 Nov 2020 20:45) (17 - 18)  SpO2: 97% (28 Nov 2020 20:45) (97% - 97%)    PHYSICAL EXAMINATION:  GENERAL: Well built [place]an man, appears stated age, obese, in some pain   HEAD:  Atraumatic, Normocephalic  EYES:  conjunctiva and sclera clear  NECK: Supple, No JVD, Normal thyroid  CHEST/LUNG: Clear to auscultation. Clear to percussion bilaterally; No rales, rhonchi, wheezing, or rubs  HEART: Regular rate and rhythm; No murmurs, rubs, or gallops  ABDOMEN: Soft, tender in epigastrium, RUQ, distended with fat, kim's sign +, no rebound tenderness; Bowel sounds present  NERVOUS SYSTEM:  Alert & Oriented X3, no motor or sensory loss   EXTREMITIES:  2+ Peripheral Pulses, No clubbing, cyanosis, or edema  SKIN: warm dry                          13.4   21.00 )-----------( 193      ( 28 Nov 2020 07:29 )             42.9     11-28    136  |  98  |  14  ----------------------------<  103<H>  3.5   |  23  |  0.52    Ca    8.7      28 Nov 2020 07:29    TPro  6.2  /  Alb  2.4<L>  /  TBili  0.9  /  DBili  x   /  AST  16  /  ALT  9<L>  /  AlkPhos  51  11-27    LIVER FUNCTIONS - ( 27 Nov 2020 07:10 )  Alb: 2.4 g/dL / Pro: 6.2 g/dL / ALK PHOS: 51 U/L / ALT: 9 U/L DA / AST: 16 U/L / GGT: x                   CAPILLARY BLOOD GLUCOSE      RADIOLOGY & ADDITIONAL TESTS:

## 2020-11-29 NOTE — DISCHARGE NOTE PROVIDER - HOSPITAL COURSE
Patient is 59M, Bruneian speaking, lives at home with his wife, walks independently with a PMHx of HTN, HLD, DM, NIKKI, h/o Left Hydrocelectomy on 7/21/2020 who presented with a chief complaint of severe abdominal pain x 1 day. Patient states that it began around 20:00 on 11/24, after eating a dinner of steak and tomatoes. He endorsed severe 9/10 pain, constant, located mostly in the epigastric area radiating to flanks, and associated with nausea, and vomiting. He denies prior episodes of these symptoms, any recent alcohol intake, drug or smoking. He was found to have Ct findings consistent with acute interstitial pancreatitis, BISAP 2, [place]'s 3. RUQ US was equivocal for gallstones, and HIDA scan negative for cholecystitis/lithiasis. Etiology likely drug induced vs autoimmune; discontinued home medications of fenofibrate and enalapril which are cited in literature as causing pancreatitis . Started hydralazine for HTN, and also discontinued home Victoza (liraglutide). Endocrinology consulted as HbA1c was 9.2, will require outpatient follow up and will be discharged on an insulin regimen. Continued on aggressive IV hydration, and pain control; s/p Zosyn for worsening wbc count, d/c'ed 11/28. Blood cultures negative till date. He had good insight about his health and was able to make his own health related decisions. Given patient's improved clinical status and current hemodynamic stability, decision was made to discharge after discussing with attending physician. Please refer to patient's complete medical chart with documents for a full hospital course, for this is only a brief summary. Patient is 59M, British speaking, lives at home with his wife, walks independently with a PMHx of HTN, HLD, DM, NIKKI, h/o Left Hydrocelectomy on 7/21/2020 who presented with a chief complaint of severe abdominal pain x 1 day. Patient states that it began around 20:00 on 11/24, after eating a dinner of steak and tomatoes. He endorsed severe 9/10 pain, constant, located mostly in the epigastric area radiating to flanks, and associated with nausea, and vomiting. He denies prior episodes of these symptoms, any recent alcohol intake, drug or smoking. He was found to have Ct findings consistent with acute interstitial pancreatitis, BISAP 2, [place]'s 3. RUQ US was equivocal for gallstones, and HIDA scan negative for cholecystitis/lithiasis. Etiology likely drug induced vs autoimmune; discontinued home medications of fenofibrate and enalapril which are cited in literature as causing pancreatitis . Started hydralazine for HTN, and also discontinued home Victoza (liraglutide). Endocrinology consulted as HbA1c was 9.2, will require outpatient follow up and will be discharged on an insulin regimen. Continued on aggressive IV hydration, and pain control; s/p Zosyn for worsening wbc count, d/c'ed 11/28. Blood cultures negative till date. He had good insight about his health and was able to make his own health related decisions. Patient is 59M, Mongolian speaking, walks independently with a PMHx of HTN, HLD, DM, NIKKI, h/o Left Hydrocelectomy on 7/21/2020 who presented with a chief complaint of severe abdominal pain x 1 day. He endorsed severe 9/10 pain, constant, located mostly in the epigastric area radiating to flanks, and associated with nausea, and vomiting. He was found to have Ct findings consistent with acute interstitial pancreatitis, BISAP 2, [place]'s 3. RUQ US was equivocal for gallstones, and HIDA scan negative for cholecystitis/lithiasis. Home medications of fenofibrate and enalapril were discontiuied, which are cited in literature as causing pancreatitis . Started hydralazine for HTN, and also discontinued home Victoza (liraglutide). Endocrinology consulted as HbA1c was 9.2, will require outpatient follow up and will be discharged on an insulin regimen. Continued on aggressive IV hydration, and pain control; s/p Zosyn for worsening wbc count, d/c'ed 11/28. Blood cultures negative till date. Conservative management did not improve symptoms and pain. Patient underwent laparoscopic cholecystectomy 12/2, patient + gallbladder sludge. Patient tolerated the procedure well and is stable for discharge. At the time of discharge, the patient was hemodynamically stable, was tolerating PO diet, was voiding urine and passing flatus, was out of bed, and was comfortable with adequate pain control. The patient was instructed to follow up with Dr. Estrella within 1-2 weeks after discharge from the hospital. The patient/family felt comfortable with discharge. The patient is stable and ready for discharge to home.

## 2020-11-29 NOTE — DISCHARGE NOTE PROVIDER - NSDCCPTREATMENT_GEN_ALL_CORE_FT
PRINCIPAL PROCEDURE  Procedure: Cholecystectomy, laparoscopic, with cholangiogram  Findings and Treatment:

## 2020-11-29 NOTE — PROGRESS NOTE ADULT - PROBLEM SELECTOR PLAN 6
- CT Abdomen/Pelvis with IV contrast identified Right renal cysts as well as too small to characterize bilateral hypodensities.  - May need Bilateral Renal Sonogram at a later date  - Nephrology outpatient f/u

## 2020-11-29 NOTE — DISCHARGE NOTE PROVIDER - CARE PROVIDER_API CALL
Norma Serrano  ENDOCRINOLOGY/METAB/DIABETES  [address] [place] [address]  Phone: [phone]  Fax: [phone]  Follow Up Time: 2 weeks   Norma Serrano  ENDOCRINOLOGY/METAB/DIABETES  [address] [place] [id number]  Phone: [phone]  Fax: [phone]  Follow Up Time: 2 weeks    Kendall Estrella  SURGERY  [address] [place] [address]  Phone: [phone]  Fax: [phone]  Follow Up Time: 1 week

## 2020-11-29 NOTE — PROGRESS NOTE ADULT - PROBLEM SELECTOR PLAN 4
- Will resume patient's home medication enalapril (reports of DIP)  - Vital Signs Q6 - Holding patient's home medication enalapril (reports of DIP)  - Hydralazine 50mg TID   - Vital Signs Q6

## 2020-11-30 DIAGNOSIS — R10.12 LEFT UPPER QUADRANT PAIN: ICD-10-CM

## 2020-11-30 DIAGNOSIS — K29.70 GASTRITIS, UNSPECIFIED, WITHOUT BLEEDING: ICD-10-CM

## 2020-11-30 DIAGNOSIS — Z87.891 PERSONAL HISTORY OF NICOTINE DEPENDENCE: ICD-10-CM

## 2020-11-30 DIAGNOSIS — G47.33 OBSTRUCTIVE SLEEP APNEA (ADULT) (PEDIATRIC): ICD-10-CM

## 2020-11-30 LAB
ANION GAP SERPL CALC-SCNC: 15 MMOL/L — SIGNIFICANT CHANGE UP (ref 5–17)
BUN SERPL-MCNC: 8 MG/DL — SIGNIFICANT CHANGE UP (ref 7–18)
CALCIUM SERPL-MCNC: 8.8 MG/DL — SIGNIFICANT CHANGE UP (ref 8.4–10.5)
CHLORIDE SERPL-SCNC: 98 MMOL/L — SIGNIFICANT CHANGE UP (ref 96–108)
CO2 SERPL-SCNC: 24 MMOL/L — SIGNIFICANT CHANGE UP (ref 22–31)
CREAT SERPL-MCNC: 0.52 MG/DL — SIGNIFICANT CHANGE UP (ref 0.5–1.3)
GLUCOSE BLDC GLUCOMTR-MCNC: 115 MG/DL — HIGH (ref 70–99)
GLUCOSE BLDC GLUCOMTR-MCNC: 126 MG/DL — HIGH (ref 70–99)
GLUCOSE BLDC GLUCOMTR-MCNC: 171 MG/DL — HIGH (ref 70–99)
GLUCOSE BLDC GLUCOMTR-MCNC: 177 MG/DL — HIGH (ref 70–99)
GLUCOSE SERPL-MCNC: 121 MG/DL — HIGH (ref 70–99)
HCT VFR BLD CALC: 43.2 % — SIGNIFICANT CHANGE UP (ref 39–50)
HGB BLD-MCNC: 13.7 G/DL — SIGNIFICANT CHANGE UP (ref 13–17)
MCHC RBC-ENTMCNC: 28.4 PG — SIGNIFICANT CHANGE UP (ref 27–34)
MCHC RBC-ENTMCNC: 31.7 GM/DL — LOW (ref 32–36)
MCV RBC AUTO: 89.4 FL — SIGNIFICANT CHANGE UP (ref 80–100)
NRBC # BLD: 0 /100 WBCS — SIGNIFICANT CHANGE UP (ref 0–0)
PLATELET # BLD AUTO: 250 K/UL — SIGNIFICANT CHANGE UP (ref 150–400)
POTASSIUM SERPL-MCNC: 3.1 MMOL/L — LOW (ref 3.5–5.3)
POTASSIUM SERPL-SCNC: 3.1 MMOL/L — LOW (ref 3.5–5.3)
RBC # BLD: 4.83 M/UL — SIGNIFICANT CHANGE UP (ref 4.2–5.8)
RBC # FLD: 14 % — SIGNIFICANT CHANGE UP (ref 10.3–14.5)
SODIUM SERPL-SCNC: 137 MMOL/L — SIGNIFICANT CHANGE UP (ref 135–145)
WBC # BLD: 16.12 K/UL — HIGH (ref 3.8–10.5)
WBC # FLD AUTO: 16.12 K/UL — HIGH (ref 3.8–10.5)

## 2020-11-30 PROCEDURE — 99232 SBSQ HOSP IP/OBS MODERATE 35: CPT

## 2020-11-30 PROCEDURE — 99232 SBSQ HOSP IP/OBS MODERATE 35: CPT | Mod: GC

## 2020-11-30 PROCEDURE — 99222 1ST HOSP IP/OBS MODERATE 55: CPT

## 2020-11-30 RX ORDER — SENNA PLUS 8.6 MG/1
2 TABLET ORAL AT BEDTIME
Refills: 0 | Status: DISCONTINUED | OUTPATIENT
Start: 2020-11-30 | End: 2020-12-02

## 2020-11-30 RX ORDER — SODIUM CHLORIDE 9 MG/ML
1000 INJECTION, SOLUTION INTRAVENOUS
Refills: 0 | Status: DISCONTINUED | OUTPATIENT
Start: 2020-11-30 | End: 2020-12-02

## 2020-11-30 RX ORDER — MORPHINE SULFATE 50 MG/1
2 CAPSULE, EXTENDED RELEASE ORAL EVERY 6 HOURS
Refills: 0 | Status: ACTIVE | OUTPATIENT
Start: 2020-11-30 | End: 2020-12-01

## 2020-11-30 RX ORDER — POLYETHYLENE GLYCOL 3350 17 G/17G
17 POWDER, FOR SOLUTION ORAL DAILY
Refills: 0 | Status: DISCONTINUED | OUTPATIENT
Start: 2020-11-30 | End: 2020-12-02

## 2020-11-30 RX ORDER — POTASSIUM CHLORIDE 20 MEQ
40 PACKET (EA) ORAL EVERY 4 HOURS
Refills: 0 | Status: COMPLETED | OUTPATIENT
Start: 2020-11-30 | End: 2020-11-30

## 2020-11-30 RX ORDER — HYDRALAZINE HCL 50 MG
75 TABLET ORAL THREE TIMES A DAY
Refills: 0 | Status: DISCONTINUED | OUTPATIENT
Start: 2020-11-30 | End: 2020-12-01

## 2020-11-30 RX ORDER — ACETAMINOPHEN 500 MG
650 TABLET ORAL EVERY 6 HOURS
Refills: 0 | Status: DISCONTINUED | OUTPATIENT
Start: 2020-11-30 | End: 2020-12-02

## 2020-11-30 RX ADMIN — GABAPENTIN 100 MILLIGRAM(S): 400 CAPSULE ORAL at 17:30

## 2020-11-30 RX ADMIN — MORPHINE SULFATE 1 MILLIGRAM(S): 50 CAPSULE, EXTENDED RELEASE ORAL at 00:17

## 2020-11-30 RX ADMIN — Medication 50 MILLIGRAM(S): at 05:16

## 2020-11-30 RX ADMIN — MORPHINE SULFATE 2 MILLIGRAM(S): 50 CAPSULE, EXTENDED RELEASE ORAL at 19:57

## 2020-11-30 RX ADMIN — Medication 650 MILLIGRAM(S): at 18:00

## 2020-11-30 RX ADMIN — MORPHINE SULFATE 1 MILLIGRAM(S): 50 CAPSULE, EXTENDED RELEASE ORAL at 04:49

## 2020-11-30 RX ADMIN — Medication 1: at 21:29

## 2020-11-30 RX ADMIN — ENOXAPARIN SODIUM 40 MILLIGRAM(S): 100 INJECTION SUBCUTANEOUS at 12:12

## 2020-11-30 RX ADMIN — MORPHINE SULFATE 1 MILLIGRAM(S): 50 CAPSULE, EXTENDED RELEASE ORAL at 09:31

## 2020-11-30 RX ADMIN — Medication 75 MILLIGRAM(S): at 17:00

## 2020-11-30 RX ADMIN — MORPHINE SULFATE 2 MILLIGRAM(S): 50 CAPSULE, EXTENDED RELEASE ORAL at 13:38

## 2020-11-30 RX ADMIN — SENNA PLUS 2 TABLET(S): 8.6 TABLET ORAL at 21:28

## 2020-11-30 RX ADMIN — ATORVASTATIN CALCIUM 20 MILLIGRAM(S): 80 TABLET, FILM COATED ORAL at 21:28

## 2020-11-30 RX ADMIN — Medication 40 MILLIEQUIVALENT(S): at 17:30

## 2020-11-30 RX ADMIN — MORPHINE SULFATE 1 MILLIGRAM(S): 50 CAPSULE, EXTENDED RELEASE ORAL at 00:47

## 2020-11-30 RX ADMIN — Medication 40 MILLIEQUIVALENT(S): at 10:27

## 2020-11-30 RX ADMIN — POLYETHYLENE GLYCOL 3350 17 GRAM(S): 17 POWDER, FOR SOLUTION ORAL at 12:12

## 2020-11-30 RX ADMIN — MORPHINE SULFATE 1 MILLIGRAM(S): 50 CAPSULE, EXTENDED RELEASE ORAL at 10:01

## 2020-11-30 RX ADMIN — MORPHINE SULFATE 2 MILLIGRAM(S): 50 CAPSULE, EXTENDED RELEASE ORAL at 13:08

## 2020-11-30 RX ADMIN — GABAPENTIN 100 MILLIGRAM(S): 400 CAPSULE ORAL at 05:16

## 2020-11-30 RX ADMIN — Medication 75 MILLIGRAM(S): at 21:28

## 2020-11-30 RX ADMIN — Medication 1: at 17:05

## 2020-11-30 RX ADMIN — MORPHINE SULFATE 2 MILLIGRAM(S): 50 CAPSULE, EXTENDED RELEASE ORAL at 19:27

## 2020-11-30 RX ADMIN — GABAPENTIN 100 MILLIGRAM(S): 400 CAPSULE ORAL at 21:28

## 2020-11-30 RX ADMIN — MORPHINE SULFATE 1 MILLIGRAM(S): 50 CAPSULE, EXTENDED RELEASE ORAL at 04:19

## 2020-11-30 RX ADMIN — PANTOPRAZOLE SODIUM 40 MILLIGRAM(S): 20 TABLET, DELAYED RELEASE ORAL at 12:12

## 2020-11-30 RX ADMIN — SODIUM CHLORIDE 150 MILLILITER(S): 9 INJECTION, SOLUTION INTRAVENOUS at 17:05

## 2020-11-30 RX ADMIN — Medication 650 MILLIGRAM(S): at 17:30

## 2020-11-30 NOTE — CONSULT NOTE ADULT - ASSESSMENT
Confidential Drug Utilization Report  Search Terms: Jonas Singer, 1960   Search Date: 11/30/2020 11:51:14 AM   The Drug Utilization Report below displays all of the controlled substance prescriptions, if any, that your patient has filled in the last twelve months. The information displayed on this report is compiled from pharmacy submissions to the Department, and accurately reflects the information as submitted by the pharmacies.  This report was requested by: Amrita Santamaria | Reference #: [id number]   You have not added a ROSIE number. Keeping your ROSIE number(s) up to date on the My ROSIE Numbers page will enable the separation of your prescriptions from others in the search results.   Others' Prescriptions  Patient Name: Jonas Singer   YOB: 1960   Address: [address] [place] [address]   Sex: Male   Rx Written	Rx Dispensed	Drug	Quantity	Days Supply	Prescriber Name		  07/21/2020	07/21/2020	oxycodone-acetaminophen 5-325 mg tablet 	8	2	Daily Cedeño		  Payment Method Insurance   Dispenser [facility] #[id number]		  * - Drugs marked with an asterisk are compound drugs. If the compound drug is made up of more than one controlled substance, then each controlled substance will be a separate row in the table.

## 2020-11-30 NOTE — PROGRESS NOTE ADULT - PROBLEM SELECTOR PLAN 4
- Holding patient's home medication enalapril (reports of DIP)  - Hydralazine 50mg TID   - BP ranging 140-150's/60-70s - Holding patient's home medication enalapril (reports of DIP)  - Hydralazine 50mg TID- uptitrated to 75 mg TID today for better control of BP  - BP ranging 140-150's/60-70s

## 2020-11-30 NOTE — PROGRESS NOTE ADULT - ASSESSMENT
INCOMPLETE NOTE--MAS      Patient is a 59 year old M with a PMH of HTN, HLD, DM and NIKKI and PSHx of  Left Hydrocelectomy who presented with a chief complaint of abdominal pain, admitted to Medicine for Acute Pancreatitis. Found to have Lipase 11k--> 6k, wbc count 17.4 k on admission with normal lactate, TG and no h/o alcohol use or any evidence of GB stones on imaging. Pt was started on Zosyn after WBC noted to increase with concern for necrotizing process. Pt afebrile with decreasing WBC. BCs negative. CT abd does not demonstrate presence of necrotizing pancreatitis or abscess. Although abd U/S and CT abd suggestive of GB inflammation, HIDA scan negative.  Zosyn d/c'd 11/28.    #1. Acute Pancreatitis without necrosis or abscess. Patient improving.     Patient is a 59 year old M with a PMH of HTN, HLD, DM and NIKKI and PSHx of  Left Hydrocelectomy who presented with a chief complaint of abdominal pain. Found to have Lipase 11k--> 6k, wbc count 17.4 k on admission with normal lactate, TG and no h/o alcohol use.  Dx with acute pancreatitis. Pt was started on Zosyn after WBC noted to increase with concern for necrotizing process. Abd imaging without evidence of necrotizing pancreatitis, abscess or cholecystitis.  Zosyn d/c'd 11/28. Pancreatitis may be drug-related, as per Endocrinology. Patient clinically improving. Pt afebrile with WBC decreased since admission. BCs negative.    Please call again if needed.

## 2020-11-30 NOTE — PROGRESS NOTE ADULT - PROBLEM SELECTOR PLAN 1
CT Abdomen/Pelvis with IV contrast findings compatible with acute interstitial pancreatitis. No peripancreatic abscess.  Mild gallbladder wall edema, +Steatosis  neg HIDA  ? drug induced pancreatitis   US showing Gallbladder sludge  recommend surgical consult for consideration of lap brant

## 2020-11-30 NOTE — PROGRESS NOTE ADULT - PROBLEM SELECTOR PLAN 1
- Pt p/w abdominal pain and elevated lipase 11,046 -> downtrending  - CT Abdomen/Pelvis with IV contrast identified findings compatible with acute interstitial pancreatitis. No peripancreatic abscess.  Mild gallbladder wall edema.  - RUQ US shows biliary sludge, no evidence of GS, biliary dilatation   - HIDA Scan done, with 4mg morphine, negative for cholecystitis  - BISAP Score= 2 (BUN>25, Age>60), [place] score: 3  - COLE<3, UDS +'ve for opiates   - Amylase elevated (529)   - Lipid Panel: Wnl, T, pt on statin and fenofibrate at home  - ESR 6, CRP elevated 4.68   - IgG subsets pending. Will hold sending- RF, HUGO, antismooth muscle antibodies, until more common etiologies ruled out   - Clears, to advance diet as tolerated   - Morphine 1mg Q4H PRN for pain management  - C/w Lactated Ringer at a rate of 150mL/h (for now)  - Strict I&O's with a goal urine output of 1cc/kg/hour  - Close monitoring and correction of electrolyte derangements  - zosyn D/c'ed , WBC trending down  - Vital Signs monitoring  - F/u IgG subsets  - GI Dr. Hill - Pt p/w abdominal pain and elevated lipase 11,046 -> downtrending  - CT Abdomen/Pelvis with IV contrast identified findings compatible with acute interstitial pancreatitis. No peripancreatic abscess.  Mild gallbladder wall edema.  - RUQ US shows biliary sludge, no evidence of GS, biliary dilatation   - HIDA Scan done, with 4mg morphine, negative for cholecystitis  - BISAP Score= 2 (BUN>25, Age>60), [place] score: 3  - COLE<3, UDS +'ve for opiates   - Amylase elevated (529)   - Lipid Panel: Wnl, T, pt on statin and fenofibrate at home  - ESR 6, CRP elevated 4.68   - IgG subsets pending. Will hold sending- RF, HUGO, antismooth muscle antibodies, until more common etiologies ruled out   - Advanced diet to Soft today, will monitor abdominal pain   - Morphine 1mg Q4H PRN for pain management  - C/w Lactated Ringer at a rate of 150mL/h (for now)  - Strict I&O's with a goal urine output of 1cc/kg/hour  - Close monitoring and correction of electrolyte derangements  - zosyn D/c'ed     - Vital Signs monitoring  - F/u IgG subsets  - GI Dr. Hill - Pt p/w abdominal pain and elevated lipase 11,046 -> downtrending  - CT Abdomen/Pelvis with IV contrast identified findings compatible with acute interstitial pancreatitis. No peripancreatic abscess.  Mild gallbladder wall edema.  - RUQ US shows biliary sludge, no evidence of GS, biliary dilatation   - HIDA Scan done, with 4mg morphine, negative for cholecystitis  - BISAP Score= 2 (BUN>25, Age>60), [place] score: 3  - COLE<3, UDS +'ve for opiates   - Amylase elevated (529)   - Lipid Panel: Wnl, T, pt on statin and fenofibrate at home  - ESR 6, CRP elevated 4.68   - IgG subsets pending. Will hold sending- RF, HUGO, antismooth muscle antibodies, until more common etiologies ruled out   - Advanced diet to Soft today, will monitor abdominal pain   - Morphine 1mg Q4H PRN for pain management  - C/w Lactated Ringer at a rate of 150mL/h (for now)  - Strict I&O's with a goal urine output of 1cc/kg/hour  - Close monitoring and correction of electrolyte derangements  - zosyn D/c'ed   - Vital Signs monitoring  - Likely 2/2 Victoza usage for DM  - F/u IgG subsets  - GI Dr. Hill

## 2020-11-30 NOTE — CONSULT NOTE ADULT - ASSESSMENT
60 y/o M, Angolan speaking admitted for elevated lipase and acute interstitial pancreatitis on CT, possible drug induced from Victoza x 4 years. US reviewed, revealing GB sludge, HIDA reviewed, negative  -IVF/ NPO  -Pain control PRN   -Med eval/clearance, please medically optimize patient for OR   -Possible OR this admission   -Recommend repeat labs including LFTS, amylase, lipase  -Continue care as per primary team

## 2020-11-30 NOTE — PROGRESS NOTE ADULT - SUBJECTIVE AND OBJECTIVE BOX
PGY-1 Progress Note discussed with attending    PAGER #: [[phone]] TILL 5:00 PM  PLEASE CONTACT ON CALL TEAM:  - On Call Team (Please refer to Melodie) FROM 5:00 PM - 8:30PM  - Nightfloat Team FROM 8:30 -7:30 AM    CHIEF COMPLAINT & BRIEF HOSPITAL COURSE: Patient is 59M, Iranian speaking, lives at home with his wife, walks independently with a PMHx of HTN, HLD, DM, NIKKI, h/o Left Hydrocelectomy on 7/21/2020 who presented with a chief complaint of severe abdominal pain x 1 day. Patient states that it began around 20:00 on 11/24, after eating a dinner of steak and tomatoes. He endorsed severe 9/10 pain, constant, located mostly in the epigastric area radiating to flanks, and associated with nausea, and vomiting. He denies prior episodes of these symptoms, any recent alcohol intake, drug or smoking. He was found to have Ct findings consistent with acute interstitial pancreatitis, BISAP 2, [place] 3. RUQ US was equivocal for gallstones, and HIDA scan negative for cholecystitis/lithiasis. Currently on aggressive IV hydration, and pain control; s/p Zosyn for worsening wbc count, d/c'ed 11/28. Blood cultures negative.     INTERVAL HPI/OVERNIGHT EVENTS: Communicated with pt using  services #[id number], pt complains of abdominal pain 6/10 intensity, however, improving   from time of admission    MEDICATIONS  (STANDING):  atorvastatin 20 milliGRAM(s) Oral at bedtime  dextrose 40% Gel 15 Gram(s) Oral once  enoxaparin Injectable 40 milliGRAM(s) SubCutaneous daily  gabapentin 100 milliGRAM(s) Oral three times a day  glucagon  Injectable 1 milliGRAM(s) IntraMuscular once  hydrALAZINE 50 milliGRAM(s) Oral three times a day  influenza   Vaccine 0.5 milliLiter(s) IntraMuscular once  insulin lispro (ADMELOG) corrective regimen sliding scale   SubCutaneous Before meals and at bedtime  lactated ringers. 1000 milliLiter(s) (150 mL/Hr) IV Continuous <Continuous>  lactated ringers. 1000 milliLiter(s) (150 mL/Hr) IV Continuous <Continuous>  pantoprazole  Injectable 40 milliGRAM(s) IV Push daily    MEDICATIONS  (PRN):  morphine  - Injectable 1 milliGRAM(s) IV Push every 4 hours PRN Severe Pain (7 - 10)  ondansetron Injectable 4 milliGRAM(s) IV Push every 4 hours PRN Nausea and/or Vomiting  traMADol 50 milliGRAM(s) Oral every 8 hours PRN Moderate Pain (4 - 6)      REVIEW OF SYSTEMS:  CONSTITUTIONAL: No fever, weight loss, or fatigue  RESPIRATORY: No cough, wheezing, chills or hemoptysis; No shortness of breath  CARDIOVASCULAR: No chest pain, palpitations, dizziness, or leg swelling  GASTROINTESTINAL: No abdominal pain. No nausea, vomiting, or hematemesis; No diarrhea or constipation. No melena or hematochezia.  GENITOURINARY: No dysuria or hematuria, urinary frequency  NEUROLOGICAL: No headaches, memory loss, loss of strength, numbness, or tremors  SKIN: No itching, burning, rashes, or lesions     Vital Signs Last 24 Hrs  T(C): 36.4 (30 Nov 2020 05:03), Max: 36.9 (29 Nov 2020 14:02)  T(F): 97.5 (30 Nov 2020 05:03), Max: 98.4 (29 Nov 2020 14:02)  HR: 64 (30 Nov 2020 05:03) (64 - 68)  BP: 148/61 (30 Nov 2020 05:03) (148/61 - 157/68)  BP(mean): --  RR: 18 (30 Nov 2020 05:03) (17 - 18)  SpO2: 98% (30 Nov 2020 05:03) (97% - 98%)    PHYSICAL EXAMINATION:  GENERAL:   HEAD:  Atraumatic, Normocephalic  EYES:  conjunctiva and sclera clear  NECK: Supple, No JVD, Normal thyroid  CHEST/LUNG: Clear to auscultation. Clear to percussion bilaterally; No rales, rhonchi, wheezing, or rubs  HEART: Regular rate and rhythm; No murmurs, rubs, or gallops  ABDOMEN: Soft, Nontender, Nondistended; Bowel sounds present  NERVOUS SYSTEM:  Alert & Oriented X3,    EXTREMITIES:  2+ Peripheral Pulses, No clubbing, cyanosis, or edema  SKIN: warm dry                          12.7   15.88 )-----------( 223      ( 29 Nov 2020 06:44 )             40.1     11-29    137  |  99  |  11  ----------------------------<  108<H>  3.3<L>   |  22  |  0.44<L>    Ca    8.7      29 Nov 2020 06:44                CAPILLARY BLOOD GLUCOSE      RADIOLOGY & ADDITIONAL TESTS:                   PGY-1 Progress Note discussed with attending    PAGER #: [[phone]] TILL 5:00 PM  PLEASE CONTACT ON CALL TEAM:  - On Call Team (Please refer to Melodie) FROM 5:00 PM - 8:30PM  - Nightfloat Team FROM 8:30 -7:30 AM    CHIEF COMPLAINT & BRIEF HOSPITAL COURSE: Patient is 59M, [place]orran speaking, lives at home with his wife, walks independently with a PMHx of HTN, HLD, DM, NIKKI, h/o Left Hydrocelectomy on 7/21/2020 who presented with a chief complaint of severe abdominal pain x 1 day. Patient states that it began around 20:00 on 11/24, after eating a dinner of steak and tomatoes. He endorsed severe 9/10 pain, constant, located mostly in the epigastric area radiating to flanks, and associated with nausea, and vomiting. He denies prior episodes of these symptoms, any recent alcohol intake, drug or smoking. He was found to have Ct findings consistent with acute interstitial pancreatitis, BISAP 2, [place]'s 3. RUQ US was equivocal for gallstones, and HIDA scan negative for cholecystitis/lithiasis. Currently on aggressive IV hydration, and pain control; s/p Zosyn for worsening wbc count, d/c'ed 11/28. Blood cultures negative.     INTERVAL HPI/OVERNIGHT EVENTS: Communicated with pt using  services #[id number], pt complains of abdominal pain 6/10 intensity, however, improving   from time of admission    MEDICATIONS  (STANDING):  atorvastatin 20 milliGRAM(s) Oral at bedtime  dextrose 40% Gel 15 Gram(s) Oral once  enoxaparin Injectable 40 milliGRAM(s) SubCutaneous daily  gabapentin 100 milliGRAM(s) Oral three times a day  glucagon  Injectable 1 milliGRAM(s) IntraMuscular once  hydrALAZINE 50 milliGRAM(s) Oral three times a day  influenza   Vaccine 0.5 milliLiter(s) IntraMuscular once  insulin lispro (ADMELOG) corrective regimen sliding scale   SubCutaneous Before meals and at bedtime  lactated ringers. 1000 milliLiter(s) (150 mL/Hr) IV Continuous <Continuous>  lactated ringers. 1000 milliLiter(s) (150 mL/Hr) IV Continuous <Continuous>  pantoprazole  Injectable 40 milliGRAM(s) IV Push daily    MEDICATIONS  (PRN):  morphine  - Injectable 1 milliGRAM(s) IV Push every 4 hours PRN Severe Pain (7 - 10)  ondansetron Injectable 4 milliGRAM(s) IV Push every 4 hours PRN Nausea and/or Vomiting  traMADol 50 milliGRAM(s) Oral every 8 hours PRN Moderate Pain (4 - 6)      REVIEW OF SYSTEMS:  CONSTITUTIONAL: No fever, weight loss, or fatigue  RESPIRATORY: No cough, wheezing, chills or hemoptysis; No shortness of breath  CARDIOVASCULAR: No chest pain, palpitations, dizziness, or leg swelling  GASTROINTESTINAL: +Abdominal pain. No nausea, vomiting, or hematemesis; No diarrhea or constipation. No melena or hematochezia.  GENITOURINARY: No dysuria or hematuria, urinary frequency  NEUROLOGICAL: No headaches, memory loss, loss of strength, numbness, or tremors  SKIN: No itching, burning, rashes, or lesions     Vital Signs Last 24 Hrs  T(C): 36.4 (30 Nov 2020 05:03), Max: 36.9 (29 Nov 2020 14:02)  T(F): 97.5 (30 Nov 2020 05:03), Max: 98.4 (29 Nov 2020 14:02)  HR: 64 (30 Nov 2020 05:03) (64 - 68)  BP: 148/61 (30 Nov 2020 05:03) (148/61 - 157/68)  BP(mean): --  RR: 18 (30 Nov 2020 05:03) (17 - 18)  SpO2: 98% (30 Nov 2020 05:03) (97% - 98%)    PHYSICAL EXAMINATION:  GENERAL: Pt lying in bed, not in acute distress  HEAD:  Atraumatic, Normocephalic  EYES:  conjunctiva and sclera clear  NECK: Supple, No JVD, Normal thyroid  CHEST/LUNG: Clear to auscultation. Clear to percussion bilaterally  HEART: Regular rate and rhythm; No murmurs, rubs, or gallops  ABDOMEN: Epigastric tenderness, Soft, Bowel sounds heard  NERVOUS SYSTEM:  Alert & Oriented X3    EXTREMITIES:  2+ Peripheral Pulses, No clubbing, cyanosis, or edema  SKIN: warm dry                          12.7   15.88 )-----------( 223      ( 29 Nov 2020 06:44 )             40.1     11-29    137  |  99  |  11  ----------------------------<  108<H>  3.3<L>   |  22  |  0.44<L>    Ca    8.7      29 Nov 2020 06:44                CAPILLARY BLOOD GLUCOSE      RADIOLOGY & ADDITIONAL TESTS:                   PGY-1 Progress Note discussed with attending    PAGER #: [[phone]] TILL 5:00 PM  PLEASE CONTACT ON CALL TEAM:  - On Call Team (Please refer to Melodie) FROM 5:00 PM - 8:30PM  - Nightfloat Team FROM 8:30 -7:30 AM    CHIEF COMPLAINT & BRIEF HOSPITAL COURSE: Patient is 59M, Sierra Leonean speaking, lives at home with his wife, walks independently with a PMHx of HTN, HLD, DM, NIKKI, h/o Left Hydrocelectomy on 7/21/2020 who presented with a chief complaint of severe abdominal pain x 1 day. Patient states that it began around 20:00 on 11/24, after eating a dinner of steak and tomatoes. He endorsed severe 9/10 pain, constant, located mostly in the epigastric area radiating to flanks, and associated with nausea, and vomiting. He denies prior episodes of these symptoms, any recent alcohol intake, drug or smoking. He was found to have Ct findings consistent with acute interstitial pancreatitis, BISAP 2, [place] 3. RUQ US was equivocal for gallstones, and HIDA scan negative for cholecystitis/lithiasis. Currently on aggressive IV hydration, and pain control; s/p Zosyn for worsening wbc count, d/c'ed 11/28. Blood cultures negative.     INTERVAL HPI/OVERNIGHT EVENTS: Communicated with pt using  services #[id number], pt complains of abdominal pain 6/10 intensity, however, improving   from time of admission. Diet advanced from clears to soft, pt endorses not having a bowel movement ~ 4 days, pt already on Miralax and Senna, will monitor for bowel movement with advancement of diet    MEDICATIONS  (STANDING):  atorvastatin 20 milliGRAM(s) Oral at bedtime  dextrose 40% Gel 15 Gram(s) Oral once  enoxaparin Injectable 40 milliGRAM(s) SubCutaneous daily  gabapentin 100 milliGRAM(s) Oral three times a day  glucagon  Injectable 1 milliGRAM(s) IntraMuscular once  hydrALAZINE 50 milliGRAM(s) Oral three times a day  influenza   Vaccine 0.5 milliLiter(s) IntraMuscular once  insulin lispro (ADMELOG) corrective regimen sliding scale   SubCutaneous Before meals and at bedtime  lactated ringers. 1000 milliLiter(s) (150 mL/Hr) IV Continuous <Continuous>  lactated ringers. 1000 milliLiter(s) (150 mL/Hr) IV Continuous <Continuous>  pantoprazole  Injectable 40 milliGRAM(s) IV Push daily    MEDICATIONS  (PRN):  morphine  - Injectable 1 milliGRAM(s) IV Push every 4 hours PRN Severe Pain (7 - 10)  ondansetron Injectable 4 milliGRAM(s) IV Push every 4 hours PRN Nausea and/or Vomiting  traMADol 50 milliGRAM(s) Oral every 8 hours PRN Moderate Pain (4 - 6)      REVIEW OF SYSTEMS:  CONSTITUTIONAL: No fever, weight loss, or fatigue  RESPIRATORY: No cough, wheezing, chills or hemoptysis; No shortness of breath  CARDIOVASCULAR: No chest pain, palpitations, dizziness, or leg swelling  GASTROINTESTINAL: +Abdominal pain. No nausea, vomiting, or hematemesis;   GENITOURINARY: No dysuria or hematuria, urinary frequency  NEUROLOGICAL: No headaches, memory loss, loss of strength, numbness, or tremors  SKIN: No itching, burning, rashes, or lesions     Vital Signs Last 24 Hrs  T(C): 36.4 (30 Nov 2020 05:03), Max: 36.9 (29 Nov 2020 14:02)  T(F): 97.5 (30 Nov 2020 05:03), Max: 98.4 (29 Nov 2020 14:02)  HR: 64 (30 Nov 2020 05:03) (64 - 68)  BP: 148/61 (30 Nov 2020 05:03) (148/61 - 157/68)  BP(mean): --  RR: 18 (30 Nov 2020 05:03) (17 - 18)  SpO2: 98% (30 Nov 2020 05:03) (97% - 98%)    PHYSICAL EXAMINATION:  GENERAL: Pt lying in bed, not in acute distress  HEAD:  Atraumatic, Normocephalic  EYES:  conjunctiva and sclera clear  NECK: Supple, No JVD, Normal thyroid  CHEST/LUNG: Clear to auscultation. Clear to percussion bilaterally  HEART: Regular rate and rhythm; No murmurs, rubs, or gallops  ABDOMEN: Epigastric tenderness, Soft, Bowel sounds heard  NERVOUS SYSTEM:  Alert & Oriented X3    EXTREMITIES:  2+ Peripheral Pulses, No clubbing, cyanosis, or edema  SKIN: warm dry                          12.7   15.88 )-----------( 223      ( 29 Nov 2020 06:44 )             40.1     11-29    137  |  99  |  11  ----------------------------<  108<H>  3.3<L>   |  22  |  0.44<L>    Ca    8.7      29 Nov 2020 06:44                CAPILLARY BLOOD GLUCOSE      RADIOLOGY & ADDITIONAL TESTS:

## 2020-11-30 NOTE — CONSULT NOTE ADULT - SUBJECTIVE AND OBJECTIVE BOX
Patient is a 59y old male PMHx of HTN, HLD, DM, NIKKI admitted for acute pancreatitis. Patient complaining of LUQ pain radiating to the back 6/10 that has minimally improved over the course of his admission. Patient denies N/V fever/chills, denies BM x4 days, tolerating diet.    Hungarian speaking  #[id number])    PAST MEDICAL & SURGICAL HISTORY:  Language barrier: Hungarian speaking  #[id number])    Former smoker, stopped smoking in distant past    NIKKI (obstructive sleep apnea)    Gastritis    Diabetes mellitus    Hyperlipidemia    Hypertension    No significant past surgical history        MEDICATIONS  (STANDING):  atorvastatin 20 milliGRAM(s) Oral at bedtime  dextrose 40% Gel 15 Gram(s) Oral once  enoxaparin Injectable 40 milliGRAM(s) SubCutaneous daily  gabapentin 100 milliGRAM(s) Oral three times a day  glucagon  Injectable 1 milliGRAM(s) IntraMuscular once  hydrALAZINE 75 milliGRAM(s) Oral three times a day  influenza   Vaccine 0.5 milliLiter(s) IntraMuscular once  insulin lispro (ADMELOG) corrective regimen sliding scale   SubCutaneous Before meals and at bedtime  lactated ringers. 1000 milliLiter(s) (150 mL/Hr) IV Continuous <Continuous>  pantoprazole  Injectable 40 milliGRAM(s) IV Push daily  polyethylene glycol 3350 17 Gram(s) Oral daily  potassium chloride    Tablet ER 40 milliEquivalent(s) Oral every 4 hours  senna 2 Tablet(s) Oral at bedtime    MEDICATIONS  (PRN):  acetaminophen   Tablet .. 650 milliGRAM(s) Oral every 6 hours PRN Moderate Pain (4 - 6)  morphine  - Injectable 2 milliGRAM(s) IV Push every 6 hours PRN Severe Pain (7 - 10)  ondansetron Injectable 4 milliGRAM(s) IV Push every 4 hours PRN Nausea and/or Vomiting      Allergies    No Known Allergies    Intolerances        Vital Signs Last 24 Hrs  T(C): 37 (30 Nov 2020 13:45), Max: 37 (30 Nov 2020 13:45)  T(F): 98.6 (30 Nov 2020 13:45), Max: 98.6 (30 Nov 2020 13:45)  HR: 71 (30 Nov 2020 13:45) (64 - 71)  BP: 161/59 (30 Nov 2020 13:45) (148/61 - 161/59)  BP(mean): --  RR: 17 (30 Nov 2020 13:45) (17 - 18)  SpO2: 97% (30 Nov 2020 13:45) (97% - 98%)    Physical:  Gen: A&Ox3. NAD  Respirations: Unlabored  Abd: Soft, distended, minimally tender in the LUQ. No rebound/guarding/peritonitis          I&O's Detail    29 Nov 2020 07:01  -  30 Nov 2020 07:00  --------------------------------------------------------  IN:  Total IN: 0 mL    OUT:    Voided (mL): 550 mL  Total OUT: 550 mL    Total NET: -550 mL          LABS:                        13.7   16.12 )-----------( 250      ( 30 Nov 2020 08:27 )             43.2              11-30    137  |  98  |  8   ----------------------------<  121<H>  3.1<L>   |  24  |  0.52    Ca    8.8      30 Nov 2020 08:27    < from: US Hepatic & Pancreatic (11.25.20 @ 14:37) >  EXAM:  US LIVER AND PANCREAS                            PROCEDURE DATE:  11/25/2020          INTERPRETATION:  Pancreatitis. Right-sided abdominal pain.    Right upper quadrant ultrasound.    Gallbladder sludge noted. No gallstones, significant gallbladder wall thickening or pericholecystic fluid. No biliary dilatation. Common duct measures 5 mm. Diffuse fatty liver noted. Pancreas not adequately visualized. Right kidney 13.3 cm with 2 simple cysts largest measuring up to  2.1 cm. No ascites.    Impression: Gallbladder sludge. If there is suspicion for cholecystitis, recommend HIDA scan. No biliary dilatation. Limited evaluation pancreas.    ALEX MEDINA MD; Attending Radiologist  This document has been electronically signed. Nov 25 2020  2:47PM    < end of copied text >  < from: US Hepatic & Pancreatic (11.25.20 @ 14:37) >  EXAM:  US LIVER AND PANCREAS                            PROCEDURE DATE:  11/25/2020          INTERPRETATION:  Pancreatitis. Right-sided abdominal pain.    Right upper quadrant ultrasound.    Gallbladder sludge noted. No gallstones, significant gallbladder wall thickening or pericholecystic fluid. No biliary dilatation. Common duct measures 5 mm. Diffuse fatty liver noted. Pancreas not adequately visualized. Right kidney 13.3 cm with 2 simple cysts largest measuring up to  2.1 cm. No ascites.    Impression: Gallbladder sludge. If there is suspicion for cholecystitis, recommend HIDA scan. No biliary dilatation. Limited evaluation pancreas.    ALEX MEDINA MD; Attending Radiologist  This document has been electronically signed. Nov 25 2020  2:47PM    < end of copied text >    < from: NM Hepatobiliary Imaging (11.27.20 @ 14:07) >  EXAM:  NM HEPATOBILIARY IMG                            PROCEDURE DATE:  11/27/2020          INTERPRETATION:  CLINICAL INFORMATION: 59 year old male with right upper quadrant abdominal pain and pancreatitis, referred to evaluate for acute cholecystitis.    RADIOPHARMACEUTICAL: 3.3 mCi and 2.9 mCi Tc-99m-Mebrofenin, I.V.; 2 doses    TECHNIQUE: Dynamic imaging of the anterior abdomen was performed for 1 hour following injection of radiotracer. Morphine 4 mg I.V. and a second dose of radiotracer were administered at 1 hour.  Dynamic imaging was continued for 1 hour followed by static images of the abdomen in the anterior, right lateral and right anterior oblique projections.    COMPARISON: No previous hepatobiliary scan for comparison    FINDINGS: There is prompt, homogeneous uptake of radiotracer by the hepatocytes. Activity is first seen in the bowel at 20 minutes. The gallbladder is not visualized in the first hour of imaging, but was seen 10 minutes after the administration of morphine.There is good clearance of activity from the liver at the end of the study.    IMPRESSION: Normal morphine-augmented hepatobiliary scan.    No radionuclide evidence of acute cholecystitis.      CARLOS OSWALD MD; Attending Nuclear Medicine  This document has been electronically signed. Nov 27 2020  2:03PM    < end of copied text >      Hungarian speaking (interpretor #[id number])

## 2020-11-30 NOTE — CONSULT NOTE ADULT - PROBLEM SELECTOR RECOMMENDATION 9
Pt with left upper quadrant abdominal pain which is somatic in nature due to acute pancreatitis. Lipase downtrending. Pancreatitis may be drug-related, as per Endocrinology.   Opioid pain recommendations   - Morphine 2mg IV q 6 hours PRN severe pain. Monitor for sedation/ respiratory depression.   Non-opioid pain recommendations   - Continue Acetaminophen 1 gram PO q 6 hours PRN moderate pain. Monitor LFTs  - Continue Gabapentin 100mg po q 8 hours. Monitor renal function.   Bowel Regimen  - Continue Miralax 17G PO daily  - Continue Senna 2 tablets at bedtime for constipation  Mild pain   - Non-pharmacological pain treatment recommendations  - Warm/ Cool packs PRN   - Repositioning, imagery, relaxation, distraction.  - Physical therapy OOB if no contraindications   Recommendations discussed with primary team and RN

## 2020-11-30 NOTE — PROGRESS NOTE ADULT - PROBLEM SELECTOR PLAN 3
- Pt taking farxiga, metformin-glyburide BD, victoza at home  - farxiga has case reports of DIP  - Hemoglobin A1c 9.2  - Monitor blood sugars, on Sliding scale for now  - Endo consult Dr. Serrano   - To start basal insulin along with oral meds on discharge with f/u   - Clear Liquids for now   - Endocrinology f/u outpatient - Pt taking farxiga, metformin-glyburide BD, victoza at home  - farxiga has case reports of DIP, Victoza is also associated with pancreatitis  - Hemoglobin A1c 9.2  - Monitor blood sugars, on Sliding scale for now  - Endo consult Dr. Serrano   - To start basal insulin along with oral meds on discharge with f/u   - Clears-> transitioned to soft today 11/30  - Endocrinology f/u outpatient

## 2020-11-30 NOTE — PROGRESS NOTE ADULT - PROBLEM SELECTOR PLAN 2
- Pt has wbc donwtrending 17k -> 27 -> 21, no fevers, abdominal pain status quo  SIRS Criteria= 1 (WBC>12,000) + no definitive source of infection   - Blood cultures negative   - Zosyn 3.375gmD/c'ed  - ESR 6, CRP elevated 4.68, Procalcitonin 0.27  - RUQ US is equivocal  - HIDA  negative for cholecystitis   - Mild increase in white count today 15k yesterday to 16k this AM, will monitor for now  - monitor vitals for now

## 2020-11-30 NOTE — PROGRESS NOTE ADULT - SUBJECTIVE AND OBJECTIVE BOX
Interval Events:  pt in nad    Allergies    No Known Allergies    Intolerances      Endocrine/Metabolic Medications:  atorvastatin 20 milliGRAM(s) Oral at bedtime  dextrose 40% Gel 15 Gram(s) Oral once  glucagon  Injectable 1 milliGRAM(s) IntraMuscular once  insulin lispro (ADMELOG) corrective regimen sliding scale   SubCutaneous Before meals and at bedtime      Vital Signs Last 24 Hrs  T(C): 36.4 (30 Nov 2020 05:03), Max: 36.9 (29 Nov 2020 14:02)  T(F): 97.5 (30 Nov 2020 05:03), Max: 98.4 (29 Nov 2020 14:02)  HR: 64 (30 Nov 2020 05:03) (64 - 68)  BP: 148/61 (30 Nov 2020 05:03) (148/61 - 157/68)  BP(mean): --  RR: 18 (30 Nov 2020 05:03) (17 - 18)  SpO2: 98% (30 Nov 2020 05:03) (97% - 98%)      PHYSICAL EXAM  All physical exam findings normal, except those marked:  General:	Alert, active, cooperative, NAD, well hydrated  .		[] Abnormal:  Neck		Normal: supple, no cervical adenopathy, no palpable thyroid  .		[] Abnormal:  Cardiovascular	Normal: regular rate, normal S1, S2, no murmurs  .		[] Abnormal:  Respiratory	Normal: no chest wall deformity, normal respiratory pattern, CTA B/L  .		[] Abnormal:  Abdominal	Normal: soft, ND, NT, bowel sounds present, no masses, no organomegaly  .		[] Abnormal:  		Normal normal genitalia, testes descended, circumcised/uncircumcised  .		Betty stage:			Breast betty:  .		Menstrual history:  .		[] Abnormal:  Extremities	Normal: FROM x4  .		[] Abnormal:  Skin		Normal: intact and not indurated, no rash, no acanthosis nigricans  .		[] Abnormal:  Neurologic	Normal: grossly intact  .		[] Abnormal:    LABS                        13.7   16.12 )-----------( 250      ( 30 Nov 2020 08:27 )             43.2                               137    |  98     |  8                   Calcium: 8.8   / iCa: x      (11-30 @ 08:27)    ----------------------------<  121       Magnesium: x                                3.1     |  24     |  0.52             Phosphorous: x          CAPILLARY BLOOD GLUCOSE      POCT Blood Glucose.: 115 mg/dL (30 Nov 2020 07:44)  POCT Blood Glucose.: 118 mg/dL (29 Nov 2020 21:24)  POCT Blood Glucose.: 131 mg/dL (29 Nov 2020 16:23)  POCT Blood Glucose.: 108 mg/dL (29 Nov 2020 11:22)        Assesment/plan    Patient is a 59 year old male [place] speaking from home live with wife walk independantly with a PMH of HTN, HLD, DM, NIKKI, h/o Left Hydrocelectomy on 7/21/2020  who presented with a chief complaint of abdominal pain. Found to have acute pancreatitis and elevated A1c. Pt takes victoza and oral dm meds as out pt.            Problem/Recommendation - 1:  Problem: Diabetes mellitus. Recommendation: poorly controlled as out pt  currently decent as pt on clear liquid diet   D/C VICTOZA out pt meds- due to pancreatitis  adjust meds when po intake resumes  consider basal insulin and oral dm meds upon d/c  d/w pts wife over the phone.     Problem/Recommendation - 2:  ·  Problem: Pancreatitis, acute: on clear liquid diet- advance diet as tolerated  per prim team.

## 2020-11-30 NOTE — PROGRESS NOTE ADULT - SUBJECTIVE AND OBJECTIVE BOX
Subjective:     Objective:      MEDS  atorvastatin 20 milliGRAM(s) Oral at bedtime  dextrose 40% Gel 15 Gram(s) Oral once  enoxaparin Injectable 40 milliGRAM(s) SubCutaneous daily  gabapentin 100 milliGRAM(s) Oral three times a day  glucagon  Injectable 1 milliGRAM(s) IntraMuscular once  hydrALAZINE 50 milliGRAM(s) Oral three times a day  influenza   Vaccine 0.5 milliLiter(s) IntraMuscular once  insulin lispro (ADMELOG) corrective regimen sliding scale   SubCutaneous Before meals and at bedtime  lactated ringers. 1000 milliLiter(s) IV Continuous <Continuous>  morphine  - Injectable 1 milliGRAM(s) IV Push every 4 hours PRN  ondansetron Injectable 4 milliGRAM(s) IV Push every 4 hours PRN  pantoprazole  Injectable 40 milliGRAM(s) IV Push daily  potassium chloride    Tablet ER 40 milliEquivalent(s) Oral every 4 hours  traMADol 50 milliGRAM(s) Oral every 8 hours PRN      PHYSICAL EXAM:    Vital Signs Last 24 Hrs  T(C): 36.4 (30 Nov 2020 05:03), Max: 36.9 (29 Nov 2020 14:02)  T(F): 97.5 (30 Nov 2020 05:03), Max: 98.4 (29 Nov 2020 14:02)  HR: 64 (30 Nov 2020 05:03) (64 - 68)  BP: 148/61 (30 Nov 2020 05:03) (148/61 - 157/68)  BP(mean): --  RR: 18 (30 Nov 2020 05:03) (17 - 18)  SpO2: 98% (30 Nov 2020 05:03) (97% - 98%)    GEN:    HEENT:    CHEST/Respiratory:    Cardiovascular:    Gastrointestinal:    Genitourinary:    Extremities:     Neurological:    Skin:      LABS/DIAGNOSTIC TESTS                            13.7   16.12 )-----------( 250      ( 30 Nov 2020 08:27 )             43.2       WBC Count: 16.12 K/uL (11-30 @ 08:27)  WBC Count: 15.88 K/uL (11-29 @ 06:44)  WBC Count: 21.00 K/uL (11-28 @ 07:29)      11-30    137  |  98  |  8   ----------------------------<  121<H>  3.1<L>   |  24  |  0.52    Ca    8.8      30 Nov 2020 08:27                    CULTURES      Culture - Blood (collected 11-26-20 @ 18:55)  Source: .Blood Blood-Peripheral  Preliminary Report (11-27-20 @ 19:01):    No growth to date.    Culture - Blood (collected 11-26-20 @ 18:55)  Source: .Blood Blood-Peripheral  Preliminary Report (11-27-20 @ 19:01):    No growth to date.    Culture - Urine (collected 11-25-20 @ 06:29)  Source: .Urine Clean Catch (Midstream)  Final Report (11-26-20 @ 06:18):    <10,000 CFU/mL Normal Urogenital Raiza                     ([place]  ID #[id number])    Subjective: Pt overall feeling better but still c/o nagging L sided abd. pain. On a liquid diet.    Objective:      MEDS  atorvastatin 20 milliGRAM(s) Oral at bedtime  dextrose 40% Gel 15 Gram(s) Oral once  enoxaparin Injectable 40 milliGRAM(s) SubCutaneous daily  gabapentin 100 milliGRAM(s) Oral three times a day  glucagon  Injectable 1 milliGRAM(s) IntraMuscular once  hydrALAZINE 50 milliGRAM(s) Oral three times a day  influenza   Vaccine 0.5 milliLiter(s) IntraMuscular once  insulin lispro (ADMELOG) corrective regimen sliding scale   SubCutaneous Before meals and at bedtime  lactated ringers. 1000 milliLiter(s) IV Continuous <Continuous>  morphine  - Injectable 1 milliGRAM(s) IV Push every 4 hours PRN  ondansetron Injectable 4 milliGRAM(s) IV Push every 4 hours PRN  pantoprazole  Injectable 40 milliGRAM(s) IV Push daily  potassium chloride    Tablet ER 40 milliEquivalent(s) Oral every 4 hours  traMADol 50 milliGRAM(s) Oral every 8 hours PRN      PHYSICAL EXAM:    Vital Signs Last 24 Hrs  T(C): 36.4 (30 Nov 2020 05:03), Max: 36.9 (29 Nov 2020 14:02)  T(F): 97.5 (30 Nov 2020 05:03), Max: 98.4 (29 Nov 2020 14:02)  HR: 64 (30 Nov 2020 05:03) (64 - 68)  BP: 148/61 (30 Nov 2020 05:03) (148/61 - 157/68)  BP(mean): --  RR: 18 (30 Nov 2020 05:03) (17 - 18)  SpO2: 98% (30 Nov 2020 05:03) (97% - 98%)    Gen: WD mod obese M in NAD     HEENT: conjunctivae clear     Neck: supple    Chest/Thorax: Clear to percussion and auscultation b/l    Cardiovascular: S1 S2 regular with no murmurs appreciated    Back: no CVAT or vertebral tenderness     Gastrointestinal: Soft ,non- distended, mild abd pain to palpation only in area of mid-abd just left of midline, BS active    Extremities: no clubbing , cyanosis; trace distal LE edema bilat     Neurological: AAOx 3     Skin: no rash           LABS/DIAGNOSTIC TESTS                        13.7   16.12 )-----------( 250      ( 30 Nov 2020 08:27 )             43.2       WBC Count: 16.12 K/uL (11-30 @ 08:27)  WBC Count: 15.88 K/uL (11-29 @ 06:44)  WBC Count: 21.00 K/uL (11-28 @ 07:29)      11-30    137  |  98  |  8   ----------------------------<  121<H>  3.1<L>   |  24  |  0.52    Ca    8.8      30 Nov 2020 08:27          CULTURES      Culture - Blood (collected 11-26-20 @ 18:55)  Source: .Blood Blood-Peripheral  Preliminary Report (11-27-20 @ 19:01):    No growth to date.    Culture - Blood (collected 11-26-20 @ 18:55)  Source: .Blood Blood-Peripheral  Preliminary Report (11-27-20 @ 19:01):    No growth to date.    Culture - Urine (collected 11-25-20 @ 06:29)  Source: .Urine Clean Catch (Midstream)  Final Report (11-26-20 @ 06:18):    <10,000 CFU/mL Normal Urogenital Raiza

## 2020-11-30 NOTE — PROGRESS NOTE ADULT - SUBJECTIVE AND OBJECTIVE BOX
GI PROGRESS NOTE    Patient is a 59y old  Male who presents with a chief complaint of pancreatitis (30 Nov 2020 11:50)      HPI:  Patient is a 59 year old male [place] speaking from home live with wife walk independantly with a PMH of HTN, HLD, DM, NIKKI, h/o Left Hydrocelectomy on 7/21/2020  who presented with a chief complaint of abdominal pain.  Patient states that beginning at approximately 20:00 on the evening of 11/24, after eating dinner of steak and tomatoes. patient endorsed experiencing a severe 9/10 pain, constant, non-radiating. Pt reports pain to be in epigastric and sometimes in RUQ abdominal pain that come sand goes.  Patient denies ever experiencing symptoms such as this before. Pt deneis any recent alcohol intake, drug or smokng.  (25 Nov 2020 04:59)          ______________________________________________________________________  PMH/PSH:  PAST MEDICAL & SURGICAL HISTORY:  Former smoker, stopped smoking in distant past    NIKKI (obstructive sleep apnea)    Gastritis    Diabetes mellitus    Hyperlipidemia    Hypertension    No significant past surgical history      ______________________________________________________________________  MEDS:  MEDICATIONS  (STANDING):  atorvastatin 20 milliGRAM(s) Oral at bedtime  dextrose 40% Gel 15 Gram(s) Oral once  enoxaparin Injectable 40 milliGRAM(s) SubCutaneous daily  gabapentin 100 milliGRAM(s) Oral three times a day  glucagon  Injectable 1 milliGRAM(s) IntraMuscular once  hydrALAZINE 75 milliGRAM(s) Oral three times a day  influenza   Vaccine 0.5 milliLiter(s) IntraMuscular once  insulin lispro (ADMELOG) corrective regimen sliding scale   SubCutaneous Before meals and at bedtime  lactated ringers. 1000 milliLiter(s) (150 mL/Hr) IV Continuous <Continuous>  pantoprazole  Injectable 40 milliGRAM(s) IV Push daily  polyethylene glycol 3350 17 Gram(s) Oral daily  potassium chloride    Tablet ER 40 milliEquivalent(s) Oral every 4 hours  senna 2 Tablet(s) Oral at bedtime    MEDICATIONS  (PRN):  acetaminophen   Tablet .. 650 milliGRAM(s) Oral every 6 hours PRN Moderate Pain (4 - 6)  morphine  - Injectable 2 milliGRAM(s) IV Push every 6 hours PRN Severe Pain (7 - 10)  ondansetron Injectable 4 milliGRAM(s) IV Push every 4 hours PRN Nausea and/or Vomiting    ______________________________________________________________________  ALL:   Allergies    No Known Allergies    Intolerances      ______________________________________________________________________  SH: Social History:  no alcohol, smoking, drugs,  last drink 1 month ago (25 Nov 2020 04:59)    ______________________________________________________________________  FH:  FAMILY HISTORY:  Family history of diabetes mellitus      ______________________________________________________________________  ROS:    CONSTITUTIONAL:  No weight loss, fever, chills, weakness or fatigue.    HEENT:  Eyes:  No visual loss, blurred vision, double vision or yellow sclerae. Ears, Nose, Throat:  No hearing loss, sneezing, congestion, runny nose or sore throat.    SKIN:  No rash or itching.    CARDIOVASCULAR:  No chest pain, chest pressure or chest discomfort. No palpitations or edema.    RESPIRATORY:  No shortness of breath, cough or sputum.    GASTROINTESTINAL:  SEE HPI    GENITOURINARY:  No dysuria, hematuria, urinary frequency    NEUROLOGICAL:  No headache, dizziness, syncope, paralysis, ataxia, numbness or tingling in the extremities. No change in bowel or bladder control.    MUSCULOSKELETAL:  No muscle, back pain, joint pain or stiffness.    HEMATOLOGIC:  No anemia, bleeding or bruising.    LYMPHATICS:  No enlarged nodes. No history of splenectomy.    PSYCHIATRIC:  No history of depression or anxiety.    ENDOCRINOLOGIC:  No reports of sweating, cold or heat intolerance. No polyuria or polydipsia.    ALLERGIES:  No history of asthma, hives, eczema or rhinitis.  ______________________________________________________________________  PHYSICAL EXAM:  T(C): 36.4 (11-30-20 @ 05:03), Max: 36.9 (11-29-20 @ 14:02)  HR: 64 (11-30-20 @ 05:03)  BP: 148/61 (11-30-20 @ 05:03)  RR: 18 (11-30-20 @ 05:03)  SpO2: 98% (11-30-20 @ 05:03)  Wt(kg): --    11-29 - 11-30  --------------------------------------------------------  IN:  Total IN: 0 mL    OUT:    Voided (mL): 550 mL  Total OUT: 550 mL    Total NET: -550 mL          GEN: NAD   CVS- S1 S2  ABD: soft nontender, non distended, bowel sounds+  LUNGS: clear to auscultation  NEURO: noin focal neuro exam; AAO x 3  Extremities: no cyanosis, no calf tenderness, no edema, no clubbing      ______________________________________________________________________  LABS:                        13.7   16.12 )-----------( 250      ( 30 Nov 2020 08:27 )             43.2     11-30    137  |  98  |  8   ----------------------------<  121<H>  3.1<L>   |  24  |  0.52    Ca    8.8      30 Nov 2020 08:27        ______________________________________________________________________             GI PROGRESS NOTE    Patient is a 59y old  Male who presents with a chief complaint of pancreatitis (30 Nov 2020 11:50)      HPI:  Patient is a 59 year old male Citizen of the [place] speaking from home live with wife walk independantly with a PMH of HTN, HLD, DM, NIKKI, h/o Left Hydrocelectomy on 7/21/2020  who presented with a chief complaint of abdominal pain.  Patient states that beginning at approximately 20:00 on the evening of 11/24, after eating dinner of steak and tomatoes. patient endorsed experiencing a severe 9/10 pain, constant, non-radiating. Pt reports pain to be in epigastric and sometimes in RUQ abdominal pain that come sand goes.  Patient denies ever experiencing symptoms such as this before. Pt deneis any recent alcohol intake, drug or smokng.  (25 Nov 2020 04:59)          ______________________________________________________________________  PMH/PSH:  PAST MEDICAL & SURGICAL HISTORY:  Former smoker, stopped smoking in distant past    NIKKI (obstructive sleep apnea)    Gastritis    Diabetes mellitus    Hyperlipidemia    Hypertension    No significant past surgical history      ______________________________________________________________________  MEDS:  MEDICATIONS  (STANDING):  atorvastatin 20 milliGRAM(s) Oral at bedtime  dextrose 40% Gel 15 Gram(s) Oral once  enoxaparin Injectable 40 milliGRAM(s) SubCutaneous daily  gabapentin 100 milliGRAM(s) Oral three times a day  glucagon  Injectable 1 milliGRAM(s) IntraMuscular once  hydrALAZINE 75 milliGRAM(s) Oral three times a day  influenza   Vaccine 0.5 milliLiter(s) IntraMuscular once  insulin lispro (ADMELOG) corrective regimen sliding scale   SubCutaneous Before meals and at bedtime  lactated ringers. 1000 milliLiter(s) (150 mL/Hr) IV Continuous <Continuous>  pantoprazole  Injectable 40 milliGRAM(s) IV Push daily  polyethylene glycol 3350 17 Gram(s) Oral daily  potassium chloride    Tablet ER 40 milliEquivalent(s) Oral every 4 hours  senna 2 Tablet(s) Oral at bedtime    MEDICATIONS  (PRN):  acetaminophen   Tablet .. 650 milliGRAM(s) Oral every 6 hours PRN Moderate Pain (4 - 6)  morphine  - Injectable 2 milliGRAM(s) IV Push every 6 hours PRN Severe Pain (7 - 10)  ondansetron Injectable 4 milliGRAM(s) IV Push every 4 hours PRN Nausea and/or Vomiting    ______________________________________________________________________  ALL:   Allergies    No Known Allergies    Intolerances      ______________________________________________________________________  SH: Social History:  no alcohol, smoking, drugs,  last drink 1 month ago (25 Nov 2020 04:59)    ______________________________________________________________________  FH:  FAMILY HISTORY:  Family history of diabetes mellitus      ______________________________________________________________________  ROS:    CONSTITUTIONAL:  No weight loss, fever, chills, weakness or fatigue.    HEENT:  Eyes:  No visual loss, blurred vision, double vision or yellow sclerae. Ears, Nose, Throat:  No hearing loss, sneezing, congestion, runny nose or sore throat.    SKIN:  No rash or itching.    CARDIOVASCULAR:  No chest pain, chest pressure or chest discomfort. No palpitations or edema.    RESPIRATORY:  No shortness of breath, cough or sputum.    GASTROINTESTINAL:  SEE HPI    GENITOURINARY:  No dysuria, hematuria, urinary frequency    NEUROLOGICAL:  No headache, dizziness, syncope, paralysis, ataxia, numbness or tingling in the extremities. No change in bowel or bladder control.    MUSCULOSKELETAL:  No muscle, back pain, joint pain or stiffness.    HEMATOLOGIC:  No anemia, bleeding or bruising.    LYMPHATICS:  No enlarged nodes. No history of splenectomy.    PSYCHIATRIC:  No history of depression or anxiety.    ENDOCRINOLOGIC:  No reports of sweating, cold or heat intolerance. No polyuria or polydipsia.    ALLERGIES:  No history of asthma, hives, eczema or rhinitis.  ______________________________________________________________________  PHYSICAL EXAM:  T(C): 36.4 (11-30-20 @ 05:03), Max: 36.9 (11-29-20 @ 14:02)  HR: 64 (11-30-20 @ 05:03)  BP: 148/61 (11-30-20 @ 05:03)  RR: 18 (11-30-20 @ 05:03)  SpO2: 98% (11-30-20 @ 05:03)  Wt(kg): --    11-29 - 11-30  --------------------------------------------------------  IN:  Total IN: 0 mL    OUT:    Voided (mL): 550 mL  Total OUT: 550 mL    Total NET: -550 mL          GEN: NAD   CVS- S1 S2  ABD: soft +tenderness (epigastric) on exam, non distended, bowel sounds+  LUNGS: clear to auscultation  NEURO: non focal neuro exam;  Extremities: no cyanosis, no calf tenderness, no edema, no clubbing      ______________________________________________________________________  LABS:                        13.7   16.12 )-----------( 250      ( 30 Nov 2020 08:27 )             43.2     11-30    137  |  98  |  8   ----------------------------<  121<H>  3.1<L>   |  24  |  0.52    Ca    8.8      30 Nov 2020 08:27        ______________________________________________________________________

## 2020-11-30 NOTE — PROGRESS NOTE ADULT - ASSESSMENT
Patient is 59M, [place] speaking, lives at home with his wife, walks independently with a PMHx of HTN, HLD, DM, NIKKI, h/o Left Hydrocelectomy on 7/21/2020 who presented with a chief complaint of severe abdominal pain x 1 day; found to have elevated lipase and acute interstitial pancreatitis on CT, likely drug induced vs autoimmune.   Patient is 59M, [place] speaking, lives at home with his wife, walks independently with a PMHx of HTN, HLD, DM, NIKKI, h/o Left Hydrocelectomy on 7/21/2020 who presented with a chief complaint of severe abdominal pain x 1 day; found to have elevated lipase and acute interstitial pancreatitis on CT, likely drug induced from Victoza.

## 2020-11-30 NOTE — CONSULT NOTE ADULT - SUBJECTIVE AND OBJECTIVE BOX
Source of information: ELIESER LOPEZ, Chart review  Patient language: Australian  : [id number]    HPI:  Patient is a 59 year old male Stateless speaking from home live with wife walk independantly with a PMH of HTN, HLD, DM, NIKKI, h/o Left Hydrocelectomy on 7/21/2020  who presented with a chief complaint of abdominal pain.  Patient states that beginning at approximately 20:00 on the evening of 11/24, after eating dinner of steak and tomatoes. patient endorsed experiencing a severe 9/10 pain, constant, non-radiating. Pt reports pain to be in epigastric and sometimes in RUQ abdominal pain that come sand goes.  Patient denies ever experiencing symptoms such as this before. Pt deneis any recent alcohol intake, drug or smokng.  (25 Nov 2020 04:59)      Patient is a 59y old  Male who presents with a chief complaint of abdominal pain, found to have pancreatitis.  Abd imaging without evidence of necrotizing pancreatitis, abscess or cholecystitis.  Zosyn d/c'd 11/28. Pancreatitis may be drug-related, as per Endocrinology. Pt seen and examined at bedside. Pt laying in bed, reports pain score 6/10 and tolerable  SCALE USED: (1-10 VNRS). Reports pain was 10/10 earlier today. Pt describes pain as constant aching, non- radiating,  alleviated by pain medication,  exacerbated by movement. Pt tolerating PO clear liquid diet. Denies lethargy, vomiting, itchiness. Reports he had nausea a couple of days ago which has since resolved. States he also vomited at home after dinner prior to coming to the hospital. Reports constipation, last BM 11/26. Reports passing flatus. Patient stated goal for pain control: to be able to take deep breaths, get out of bed to chair and ambulate with tolerable pain control. Pt denies taking medications for pain at home.     PAST MEDICAL & SURGICAL HISTORY:  NIKKI (obstructive sleep apnea)    Gastritis    Diabetes mellitus    Hyperlipidemia    Hypertension    No significant past surgical history        FAMILY HISTORY:  Family history of diabetes mellitus        Social History:  no alcohol, smoking, drugs,  last drink 1 month ago (25 Nov 2020 04:59)   [X] Denies ETOH use, illicit drug use   [X] former smoking when young adult     Allergies    No Known Allergies      MEDICATIONS  (STANDING):  atorvastatin 20 milliGRAM(s) Oral at bedtime  dextrose 40% Gel 15 Gram(s) Oral once  enoxaparin Injectable 40 milliGRAM(s) SubCutaneous daily  gabapentin 100 milliGRAM(s) Oral three times a day  glucagon  Injectable 1 milliGRAM(s) IntraMuscular once  hydrALAZINE 75 milliGRAM(s) Oral three times a day  influenza   Vaccine 0.5 milliLiter(s) IntraMuscular once  insulin lispro (ADMELOG) corrective regimen sliding scale   SubCutaneous Before meals and at bedtime  lactated ringers. 1000 milliLiter(s) (150 mL/Hr) IV Continuous <Continuous>  pantoprazole  Injectable 40 milliGRAM(s) IV Push daily  potassium chloride    Tablet ER 40 milliEquivalent(s) Oral every 4 hours    MEDICATIONS  (PRN):  morphine  - Injectable 1 milliGRAM(s) IV Push every 4 hours PRN Severe Pain (7 - 10)  ondansetron Injectable 4 milliGRAM(s) IV Push every 4 hours PRN Nausea and/or Vomiting  traMADol 50 milliGRAM(s) Oral every 8 hours PRN Moderate Pain (4 - 6)      Vital Signs Last 24 Hrs  T(C): 36.4 (30 Nov 2020 05:03), Max: 36.9 (29 Nov 2020 14:02)  T(F): 97.5 (30 Nov 2020 05:03), Max: 98.4 (29 Nov 2020 14:02)  HR: 64 (30 Nov 2020 05:03) (64 - 68)  BP: 148/61 (30 Nov 2020 05:03) (148/61 - 157/68)  BP(mean): --  RR: 18 (30 Nov 2020 05:03) (17 - 18)  SpO2: 98% (30 Nov 2020 05:03) (97% - 98%)    LABS: Reviewed.                          13.7   16.12 )-----------( 250      ( 30 Nov 2020 08:27 )             43.2     11-30    137  |  98  |  8   ----------------------------<  121<H>  3.1<L>   |  24  |  0.52    Ca    8.8      30 Nov 2020 08:27            CAPILLARY BLOOD GLUCOSE      POCT Blood Glucose.: 126 mg/dL (30 Nov 2020 11:31)  POCT Blood Glucose.: 115 mg/dL (30 Nov 2020 07:44)  POCT Blood Glucose.: 118 mg/dL (29 Nov 2020 21:24)  POCT Blood Glucose.: 131 mg/dL (29 Nov 2020 16:23)    COVID-19 PCR: NotDetec (25 Nov 2020 05:02)      Radiology: Reviewed.       < from: US Hepatic & Pancreatic (11.25.20 @ 14:37) >  EXAM:  US LIVER AND PANCREAS                            PROCEDURE DATE:  11/25/2020          INTERPRETATION:  Pancreatitis. Right-sided abdominal pain.    Right upper quadrant ultrasound.    Gallbladder sludge noted. No gallstones, significant gallbladder wall thickening or pericholecystic fluid. No biliary dilatation. Common duct measures 5 mm. Diffuse fatty liver noted. Pancreas not adequately visualized. Right kidney 13.3 cm with 2 simple cysts largest measuring up to  2.1 cm. No ascites.    Impression: Gallbladder sludge. If there is suspicion for cholecystitis, recommend HIDA scan. No biliary dilatation. Limited evaluation pancreas.            ALEX MEDINA MD; Attending Radiologist  This document has been electronically signed. Nov 25 2020  2:47PM    < end of copied text >      < from: NM Hepatobiliary Imaging (11.27.20 @ 14:07) >  EXAM:  NM HEPATOBILIARY IMG                            PROCEDURE DATE:  11/27/2020          INTERPRETATION:  CLINICAL INFORMATION: 59 year old male with right upper quadrant abdominal pain and pancreatitis, referred to evaluate for acute cholecystitis.    RADIOPHARMACEUTICAL: 3.3 mCi and 2.9 mCi Tc-99m-Mebrofenin, I.V.; 2 doses    TECHNIQUE: Dynamic imaging of the anterior abdomen was performed for 1 hour following injection of radiotracer. Morphine 4 mg I.V. and a second dose of radiotracer were administered at 1 hour.  Dynamic imaging was continued for 1 hour followed by static images of the abdomen in the anterior, right lateral and right anterior oblique projections.    COMPARISON: No previous hepatobiliary scan for comparison    FINDINGS: There is prompt, homogeneous uptake of radiotracer by the hepatocytes. Activity is first seen in the bowel at 20 minutes. The gallbladder is not visualized in the first hour of imaging, but was seen 10 minutes after the administration of morphine.There is good clearance of activity from the liver at the end of the study.    IMPRESSION: Normal morphine-augmented hepatobiliary scan.    No radionuclide evidence of acute cholecystitis.                CARLOS OSWALD MD; Attending Nuclear Medicine  This document has been electronically signed. Nov 27 2020  2:03PM    < end of copied text >    < from: Xray Chest 1 View-PORTABLE IMMEDIATE (Xray Chest 1 View-PORTABLE IMMEDIATE .) (11.25.20 @ 06:26) >    EXAM:  XR CHEST PORTABLE IMMED 1V                            PROCEDURE DATE:  11/25/2020          INTERPRETATION:  Portable chest x-ray    Indication: severe pancreatitis. Evaluation for pleural effusion.    Portable chest x-ray is acquired withouta previous examination for comparison.    Impression: The examination limited by poor inspiratory effort, resulting in vascular crowding. No gross pulmonary consolidation, pleural effusion or pneumothorax    The cardiac silhouette is magnified by AP technique.                  LIONEL REYES MD; Attending Radiologist  This document has been electronically signed. Nov 25 2020  9:46AM    < end of copied text >    ORT Score -   Family Hx of substance abuse	Female	      Male  Alcohol 	                                           1                     3  Illegal drugs	                                   2                     3  Rx drugs                                           4 	                  4  Personal Hx of substance abuse		  Alcohol 	                                          3	                  3  Illegal drugs                                     4	                  4  Rx drugs                                            5 	                  5  Age between 16- 45 years	           1                     1  hx preadolescent sexual abuse	   3 	                  0  Psychological disease		  ADD, OCD, bipolar, schizophrenia   2	          2  Depression                                           1 	          1  Total: 0    a score of 3 or lower indicates low risk for opioid abuse		  a score of 4-7 indicates moderate risk for opioid abuse		  a score of 8 or higher indicates high risk for opioid abuse    REVIEW OF SYSTEMS:  CONSTITUTIONAL: No fever or fatigue  RESPIRATORY: No cough, wheezing, chills or hemoptysis; No shortness of breath  CARDIOVASCULAR: No chest pain, palpitations, dizziness, or leg swelling  GASTROINTESTINAL: + abdominal pain. No nausea (resolved), vomiting; No diarrhea or constipation.   GENITOURINARY: No dysuria, frequency, hematuria, retention or incontinence  MUSCULOSKELETAL: No joint pain or swelling; No muscle, back, or extremity pain, no upper or lower motor strength weakness, no saddle anesthesia, bowel/bladder incontinence  NEURO: No numbness/ tingling in b/l LE   PSYCHIATRIC: No depression, anxiety, mood swings, or difficulty sleeping    PHYSICAL EXAM:  GENERAL:  Alert & Oriented X3, NAD, Good concentration  CHEST/LUNG: Clear to auscultation bilaterally; No rales, rhonchi, wheezing, or rubs  HEART: Regular rate and rhythm; No murmurs, rubs, or gallops  ABDOMEN: Soft, left upper quadrant appropriately tender, Nondistended; Bowel sounds present  EXTREMITIES:  2+ Peripheral Pulses, No cyanosis, + B/l LE moderate edema  MUSCULOSKELETAL: Motor Strength 5/5 B/L upper and lower extremities; moves all extremities equally against gravity; ROM intact  SKIN: No rashes or lesions    Risk factors associated with adverse outcomes related to opioid treatment  [ ]  Concurrent benzodiazepine use  [ ]  History/ Active substance use or alcohol use disorder  [ ] Psychiatric co-morbidity  [X ] Sleep apnea  [ ] COPD  [ ] BMI> 35  [ ] Liver dysfunction  [ ] Renal dysfunction  [ ] CHF  [X ] Former Smoker  [X ]  Age > 60 years    [X]  [place]  Reviewed and Copied to Chart. See below.    Plan of care and goal oriented pain management treatment options were discussed with patient and /or primary care giver; all questions and concerns were addressed and care was aligned with patient's wishes.    Educated patient on goal oriented pain management treatment options

## 2020-12-01 ENCOUNTER — TRANSCRIPTION ENCOUNTER (OUTPATIENT)
Age: 60
End: 2020-12-01

## 2020-12-01 LAB
ALBUMIN SERPL ELPH-MCNC: 2 G/DL — LOW (ref 3.5–5)
ALP SERPL-CCNC: 68 U/L — SIGNIFICANT CHANGE UP (ref 40–120)
ALT FLD-CCNC: 15 U/L DA — SIGNIFICANT CHANGE UP (ref 10–60)
AMYLASE P1 CFR SERPL: 31 U/L — SIGNIFICANT CHANGE UP (ref 25–115)
ANION GAP SERPL CALC-SCNC: 12 MMOL/L — SIGNIFICANT CHANGE UP (ref 5–17)
AST SERPL-CCNC: 16 U/L — SIGNIFICANT CHANGE UP (ref 10–40)
BILIRUB DIRECT SERPL-MCNC: 0.3 MG/DL — HIGH (ref 0–0.2)
BILIRUB INDIRECT FLD-MCNC: 0.6 MG/DL — SIGNIFICANT CHANGE UP (ref 0.2–1)
BILIRUB SERPL-MCNC: 0.9 MG/DL — SIGNIFICANT CHANGE UP (ref 0.2–1.2)
BUN SERPL-MCNC: 9 MG/DL — SIGNIFICANT CHANGE UP (ref 7–18)
CALCIUM SERPL-MCNC: 8.8 MG/DL — SIGNIFICANT CHANGE UP (ref 8.4–10.5)
CHLORIDE SERPL-SCNC: 101 MMOL/L — SIGNIFICANT CHANGE UP (ref 96–108)
CO2 SERPL-SCNC: 25 MMOL/L — SIGNIFICANT CHANGE UP (ref 22–31)
CREAT SERPL-MCNC: 0.44 MG/DL — LOW (ref 0.5–1.3)
CULTURE RESULTS: SIGNIFICANT CHANGE UP
CULTURE RESULTS: SIGNIFICANT CHANGE UP
GLUCOSE BLDC GLUCOMTR-MCNC: 146 MG/DL — HIGH (ref 70–99)
GLUCOSE BLDC GLUCOMTR-MCNC: 159 MG/DL — HIGH (ref 70–99)
GLUCOSE BLDC GLUCOMTR-MCNC: 170 MG/DL — HIGH (ref 70–99)
GLUCOSE BLDC GLUCOMTR-MCNC: 189 MG/DL — HIGH (ref 70–99)
GLUCOSE SERPL-MCNC: 158 MG/DL — HIGH (ref 70–99)
HCT VFR BLD CALC: 42 % — SIGNIFICANT CHANGE UP (ref 39–50)
HGB BLD-MCNC: 13.6 G/DL — SIGNIFICANT CHANGE UP (ref 13–17)
LIDOCAIN IGE QN: 240 U/L — SIGNIFICANT CHANGE UP (ref 73–393)
MAGNESIUM SERPL-MCNC: 2 MG/DL — SIGNIFICANT CHANGE UP (ref 1.6–2.6)
MCHC RBC-ENTMCNC: 28.3 PG — SIGNIFICANT CHANGE UP (ref 27–34)
MCHC RBC-ENTMCNC: 32.4 GM/DL — SIGNIFICANT CHANGE UP (ref 32–36)
MCV RBC AUTO: 87.5 FL — SIGNIFICANT CHANGE UP (ref 80–100)
NRBC # BLD: 0 /100 WBCS — SIGNIFICANT CHANGE UP (ref 0–0)
PHOSPHATE SERPL-MCNC: 2.7 MG/DL — SIGNIFICANT CHANGE UP (ref 2.5–4.5)
PLATELET # BLD AUTO: 262 K/UL — SIGNIFICANT CHANGE UP (ref 150–400)
POTASSIUM SERPL-MCNC: 3.4 MMOL/L — LOW (ref 3.5–5.3)
POTASSIUM SERPL-SCNC: 3.4 MMOL/L — LOW (ref 3.5–5.3)
PROT SERPL-MCNC: 6 G/DL — SIGNIFICANT CHANGE UP (ref 6–8.3)
RBC # BLD: 4.8 M/UL — SIGNIFICANT CHANGE UP (ref 4.2–5.8)
RBC # FLD: 14.1 % — SIGNIFICANT CHANGE UP (ref 10.3–14.5)
SODIUM SERPL-SCNC: 138 MMOL/L — SIGNIFICANT CHANGE UP (ref 135–145)
SPECIMEN SOURCE: SIGNIFICANT CHANGE UP
SPECIMEN SOURCE: SIGNIFICANT CHANGE UP
WBC # BLD: 12.49 K/UL — HIGH (ref 3.8–10.5)
WBC # FLD AUTO: 12.49 K/UL — HIGH (ref 3.8–10.5)

## 2020-12-01 PROCEDURE — 99232 SBSQ HOSP IP/OBS MODERATE 35: CPT

## 2020-12-01 PROCEDURE — 99233 SBSQ HOSP IP/OBS HIGH 50: CPT | Mod: GC

## 2020-12-01 PROCEDURE — 99231 SBSQ HOSP IP/OBS SF/LOW 25: CPT

## 2020-12-01 RX ORDER — OXYCODONE HYDROCHLORIDE 5 MG/1
5 TABLET ORAL EVERY 4 HOURS
Refills: 0 | Status: DISCONTINUED | OUTPATIENT
Start: 2020-12-01 | End: 2020-12-02

## 2020-12-01 RX ORDER — POTASSIUM CHLORIDE 20 MEQ
40 PACKET (EA) ORAL EVERY 4 HOURS
Refills: 0 | Status: COMPLETED | OUTPATIENT
Start: 2020-12-01 | End: 2020-12-01

## 2020-12-01 RX ORDER — HYDRALAZINE HCL 50 MG
25 TABLET ORAL EVERY 8 HOURS
Refills: 0 | Status: DISCONTINUED | OUTPATIENT
Start: 2020-12-01 | End: 2020-12-02

## 2020-12-01 RX ADMIN — OXYCODONE HYDROCHLORIDE 5 MILLIGRAM(S): 5 TABLET ORAL at 21:21

## 2020-12-01 RX ADMIN — MORPHINE SULFATE 2 MILLIGRAM(S): 50 CAPSULE, EXTENDED RELEASE ORAL at 10:22

## 2020-12-01 RX ADMIN — MORPHINE SULFATE 2 MILLIGRAM(S): 50 CAPSULE, EXTENDED RELEASE ORAL at 01:29

## 2020-12-01 RX ADMIN — Medication 25 MILLIGRAM(S): at 21:21

## 2020-12-01 RX ADMIN — MORPHINE SULFATE 2 MILLIGRAM(S): 50 CAPSULE, EXTENDED RELEASE ORAL at 01:59

## 2020-12-01 RX ADMIN — SODIUM CHLORIDE 150 MILLILITER(S): 9 INJECTION, SOLUTION INTRAVENOUS at 05:18

## 2020-12-01 RX ADMIN — Medication 1: at 08:09

## 2020-12-01 RX ADMIN — Medication 40 MILLIEQUIVALENT(S): at 14:58

## 2020-12-01 RX ADMIN — GABAPENTIN 100 MILLIGRAM(S): 400 CAPSULE ORAL at 21:21

## 2020-12-01 RX ADMIN — PANTOPRAZOLE SODIUM 40 MILLIGRAM(S): 20 TABLET, DELAYED RELEASE ORAL at 11:54

## 2020-12-01 RX ADMIN — Medication 25 MILLIGRAM(S): at 15:36

## 2020-12-01 RX ADMIN — MORPHINE SULFATE 2 MILLIGRAM(S): 50 CAPSULE, EXTENDED RELEASE ORAL at 09:52

## 2020-12-01 RX ADMIN — ENOXAPARIN SODIUM 40 MILLIGRAM(S): 100 INJECTION SUBCUTANEOUS at 11:54

## 2020-12-01 RX ADMIN — OXYCODONE HYDROCHLORIDE 5 MILLIGRAM(S): 5 TABLET ORAL at 22:10

## 2020-12-01 RX ADMIN — OXYCODONE HYDROCHLORIDE 5 MILLIGRAM(S): 5 TABLET ORAL at 14:57

## 2020-12-01 RX ADMIN — Medication 40 MILLIEQUIVALENT(S): at 09:53

## 2020-12-01 RX ADMIN — POLYETHYLENE GLYCOL 3350 17 GRAM(S): 17 POWDER, FOR SOLUTION ORAL at 11:54

## 2020-12-01 RX ADMIN — OXYCODONE HYDROCHLORIDE 5 MILLIGRAM(S): 5 TABLET ORAL at 15:27

## 2020-12-01 RX ADMIN — Medication 75 MILLIGRAM(S): at 05:18

## 2020-12-01 RX ADMIN — GABAPENTIN 100 MILLIGRAM(S): 400 CAPSULE ORAL at 05:18

## 2020-12-01 RX ADMIN — Medication 1: at 21:43

## 2020-12-01 RX ADMIN — ATORVASTATIN CALCIUM 20 MILLIGRAM(S): 80 TABLET, FILM COATED ORAL at 21:21

## 2020-12-01 RX ADMIN — GABAPENTIN 100 MILLIGRAM(S): 400 CAPSULE ORAL at 14:58

## 2020-12-01 RX ADMIN — Medication 1: at 16:54

## 2020-12-01 NOTE — PROGRESS NOTE ADULT - SUBJECTIVE AND OBJECTIVE BOX
GI PROGRESS NOTE    Patient is a 59y old  Male who presents with a chief complaint of pancreatitis (01 Dec 2020 08:52)      HPI:  Patient is a 59 year old male Beninese speaking from home live with wife walk independantly with a PMH of HTN, HLD, DM, NIKKI, h/o Left Hydrocelectomy on 7/21/2020  who presented with a chief complaint of abdominal pain.  Patient states that beginning at approximately 20:00 on the evening of 11/24, after eating dinner of steak and tomatoes. patient endorsed experiencing a severe 9/10 pain, constant, non-radiating. Pt reports pain to be in epigastric and sometimes in RUQ abdominal pain that come sand goes.  Patient denies ever experiencing symptoms such as this before. Pt deneis any recent alcohol intake, drug or smokng.  (25 Nov 2020 04:59)          ______________________________________________________________________  PMH/PSH:  PAST MEDICAL & SURGICAL HISTORY:  Language barrier    Former smoker, stopped smoking in distant past    NIKKI (obstructive sleep apnea)    Gastritis    Diabetes mellitus    Hyperlipidemia    Hypertension    No significant past surgical history      ______________________________________________________________________  MEDS:  MEDICATIONS  (STANDING):  atorvastatin 20 milliGRAM(s) Oral at bedtime  dextrose 40% Gel 15 Gram(s) Oral once  enoxaparin Injectable 40 milliGRAM(s) SubCutaneous daily  gabapentin 100 milliGRAM(s) Oral three times a day  glucagon  Injectable 1 milliGRAM(s) IntraMuscular once  hydrALAZINE 75 milliGRAM(s) Oral three times a day  influenza   Vaccine 0.5 milliLiter(s) IntraMuscular once  insulin lispro (ADMELOG) corrective regimen sliding scale   SubCutaneous Before meals and at bedtime  lactated ringers. 1000 milliLiter(s) (100 mL/Hr) IV Continuous <Continuous>  pantoprazole  Injectable 40 milliGRAM(s) IV Push daily  polyethylene glycol 3350 17 Gram(s) Oral daily  potassium chloride    Tablet ER 40 milliEquivalent(s) Oral every 4 hours  senna 2 Tablet(s) Oral at bedtime    MEDICATIONS  (PRN):  acetaminophen   Tablet .. 650 milliGRAM(s) Oral every 6 hours PRN Moderate Pain (4 - 6)  ondansetron Injectable 4 milliGRAM(s) IV Push every 4 hours PRN Nausea and/or Vomiting    ______________________________________________________________________  ALL:   Allergies    No Known Allergies    Intolerances      ______________________________________________________________________  SH: Social History:  no alcohol, smoking, drugs,  last drink 1 month ago (25 Nov 2020 04:59)    ______________________________________________________________________  FH:  FAMILY HISTORY:  Family history of diabetes mellitus      ______________________________________________________________________  ROS:    CONSTITUTIONAL:  No weight loss, fever, chills, weakness or fatigue.    HEENT:  Eyes:  No visual loss, blurred vision, double vision or yellow sclerae. Ears, Nose, Throat:  No hearing loss, sneezing, congestion, runny nose or sore throat.    SKIN:  No rash or itching.    CARDIOVASCULAR:  No chest pain, chest pressure or chest discomfort. No palpitations or edema.    RESPIRATORY:  No shortness of breath, cough or sputum.    GASTROINTESTINAL:  SEE HPI    GENITOURINARY:  No dysuria, hematuria, urinary frequency    NEUROLOGICAL:  No headache, dizziness, syncope, paralysis, ataxia, numbness or tingling in the extremities. No change in bowel or bladder control.    MUSCULOSKELETAL:  No muscle, back pain, joint pain or stiffness.    HEMATOLOGIC:  No anemia, bleeding or bruising.    LYMPHATICS:  No enlarged nodes. No history of splenectomy.    PSYCHIATRIC:  No history of depression or anxiety.    ENDOCRINOLOGIC:  No reports of sweating, cold or heat intolerance. No polyuria or polydipsia.    ALLERGIES:  No history of asthma, hives, eczema or rhinitis.  ______________________________________________________________________  PHYSICAL EXAM:  T(C): 36.8 (12-01-20 @ 05:11), Max: 37 (11-30-20 @ 13:45)  HR: 66 (12-01-20 @ 05:11)  BP: 147/64 (12-01-20 @ 05:11)  RR: 17 (12-01-20 @ 05:11)  SpO2: 98% (12-01-20 @ 05:11)  Wt(kg): --      GEN: NAD   CVS- S1 S2  ABD: soft nontender, non distended, bowel sounds+ no rebound, no guarding  LUNGS: clear to auscultation  NEURO: non focal neuro exam;   Extremities: no cyanosis, no calf tenderness, no edema, no clubbing      ______________________________________________________________________  LABS:                        13.6   12.49 )-----------( 262      ( 01 Dec 2020 07:46 )             42.0     12-01    138  |  101  |  9   ----------------------------<  158<H>  3.4<L>   |  25  |  0.44<L>    Ca    8.8      01 Dec 2020 07:46  Phos  2.7     12-01  Mg     2.0     12-01    TPro  6.0  /  Alb  2.0<L>  /  TBili  0.9  /  DBili  0.3<H>  /  AST  16  /  ALT  15  /  AlkPhos  68  12-01    LIVER FUNCTIONS - ( 01 Dec 2020 07:46 )  Alb: 2.0 g/dL / Pro: 6.0 g/dL / ALK PHOS: 68 U/L / ALT: 15 U/L DA / AST: 16 U/L / GGT: x

## 2020-12-01 NOTE — PROGRESS NOTE ADULT - ASSESSMENT
Patient is 59M, Bermudian speaking, lives at home with his wife, walks independently with a PMHx of HTN, HLD, DM, NIKKI, h/o Left Hydrocelectomy on 7/21/2020 who presented with a chief complaint of severe abdominal pain x 1 day; found to have elevated lipase and acute interstitial pancreatitis on CT, due to drug induced from Victoza vs. passed gall bladder stone (US showing GB sludge).

## 2020-12-01 NOTE — PROGRESS NOTE ADULT - PROBLEM SELECTOR PLAN 2
- Holding patient's home medication enalapril (reports of DIP)  - Hydralazine 75 mg TID- downtitrated to 25 mg BID, and resumed pt's home med Enalapril 10 mg (home dose 20 mg)  - BP ranging 140-150's/60-70s

## 2020-12-01 NOTE — PROGRESS NOTE ADULT - PROBLEM SELECTOR PLAN 3
- Pt taking farxiga, metformin-glyburide BD, victoza at home  - farxiga has case reports of DIP, Victoza is also associated with pancreatitis  - Hemoglobin A1c 9.2  - Monitor blood sugars, on Sliding scale for now  - Endo consult Dr. Serrano   - To start basal insulin along with oral meds on discharge with f/u   - Diet transitioned to soft, pt tolerating well  - Endocrinology f/u outpatient

## 2020-12-01 NOTE — PROGRESS NOTE ADULT - SUBJECTIVE AND OBJECTIVE BOX
Surgery Pre-op Note    Preoperative Diagnosis: Gallstone pancreatitis    Planned Procedure: Laparoscopic Cholecystectomy possible open, intraoperative cholangiogram                          13.6   12.49 )-----------( 262      ( 01 Dec 2020 07:46 )             42.0       12-01    138  |  101  |  9   ----------------------------<  158<H>  3.4<L>   |  25  |  0.44<L>    Ca    8.8      01 Dec 2020 07:46  Phos  2.7     12-01  Mg     2.0     12-01    TPro  6.0  /  Alb  2.0<L>  /  TBili  0.9  /  DBili  0.3<H>  /  AST  16  /  ALT  15  /  AlkPhos  68  12-01      LIVER FUNCTIONS - ( 01 Dec 2020 07:46 )  Alb: 2.0 g/dL / Pro: 6.0 g/dL / ALK PHOS: 68 U/L / ALT: 15 U/L DA / AST: 16 U/L / GGT: x                 Type & Screen: pending    EKG:    < from: 12 Lead ECG (11.25.20 @ 01:11) >  Ventricular Rate 76 BPM    Atrial Rate 76 BPM    P-R Interval 130 ms    QRS Duration 154 ms    Q-T Interval 438 ms    QTC Calculation(Bazett) 492 ms    P Axis 22 degrees    R Axis -66 degrees    T Axis 38 degrees    Diagnosis Line Normal sinus rhythm  Right bundle branch block  Left anterior fascicular block  *** Bifascicular block ***  Abnormal ECG    < end of copied text >      CXR:  c< from: Xray Chest 1 View-PORTABLE IMMEDIATE (Xray Chest 1 View-PORTABLE IMMEDIATE .) (11.25.20 @ 06:26) >    EXAM:  XR CHEST PORTABLE IMMED 1V                            PROCEDURE DATE:  11/25/2020          INTERPRETATION:  Portable chest x-ray    Indication: severe pancreatitis. Evaluation for pleural effusion.    Portable chest x-ray is acquired withouta previous examination for comparison.    Impression: The examination limited by poor inspiratory effort, resulting in vascular crowding. No gross pulmonary consolidation, pleural effusion or pneumothorax    The cardiac silhouette is magnified by AP technique.      < end of copied text >

## 2020-12-01 NOTE — PROGRESS NOTE ADULT - ASSESSMENT
Confidential Drug Utilization Report  Search Terms: Jonas Singer, 1960   Search Date: 11/30/2020 11:51:14 AM   The Drug Utilization Report below displays all of the controlled substance prescriptions, if any, that your patient has filled in the last twelve months. The information displayed on this report is compiled from pharmacy submissions to the Department, and accurately reflects the information as submitted by the pharmacies.  This report was requested by: Amrita Santamaria | Reference #: [id number]   You have not added a ROSIE number. Keeping your ROSIE number(s) up to date on the My ROSIE Numbers page will enable the separation of your prescriptions from others in the search results.   Others' Prescriptions  Patient Name: Jonas Singer   YOB: 1960   Address: [address] [place] [address]   Sex: Male   Rx Written	Rx Dispensed	Drug	Quantity	Days Supply	Prescriber Name		  07/21/2020	07/21/2020	oxycodone-acetaminophen 5-325 mg tablet 	8	2	Daily Cedeño		  Payment Method Insurance   Dispenser [facility] #[id number]		  * - Drugs marked with an asterisk are compound drugs. If the compound drug is made up of more than one controlled substance, then each controlled substance will be a separate row in the table.

## 2020-12-01 NOTE — PROGRESS NOTE ADULT - SUBJECTIVE AND OBJECTIVE BOX
Interval Events:  pt in nad    Allergies    No Known Allergies    Intolerances      Endocrine/Metabolic Medications:  atorvastatin 20 milliGRAM(s) Oral at bedtime  dextrose 40% Gel 15 Gram(s) Oral once  glucagon  Injectable 1 milliGRAM(s) IntraMuscular once  insulin lispro (ADMELOG) corrective regimen sliding scale   SubCutaneous Before meals and at bedtime      Vital Signs Last 24 Hrs  T(C): 36.8 (01 Dec 2020 05:11), Max: 37 (30 Nov 2020 13:45)  T(F): 98.2 (01 Dec 2020 05:11), Max: 98.6 (30 Nov 2020 13:45)  HR: 66 (01 Dec 2020 05:11) (66 - 71)  BP: 147/64 (01 Dec 2020 05:11) (146/56 - 161/59)  BP(mean): --  RR: 17 (01 Dec 2020 05:11) (17 - 17)  SpO2: 98% (01 Dec 2020 05:11) (96% - 98%)      PHYSICAL EXAM  All physical exam findings normal, except those marked:  General:	Alert, active, cooperative, NAD, well hydrated  .		[] Abnormal:  Neck		Normal: supple, no cervical adenopathy, no palpable thyroid  .		[] Abnormal:  Cardiovascular	Normal: regular rate, normal S1, S2, no murmurs  .		[] Abnormal:  Respiratory	Normal: no chest wall deformity, normal respiratory pattern, CTA B/L  .		[] Abnormal:  Abdominal	Normal: soft, ND, NT, bowel sounds present, no masses, no organomegaly  .		[] Abnormal:  		Normal normal genitalia, testes descended, circumcised/uncircumcised  .		Betty stage:			Breast betty:  .		Menstrual history:  .		[] Abnormal:  Extremities	Normal: FROM x4  .		[] Abnormal:  Skin		Normal: intact and not indurated, no rash, no acanthosis nigricans  .		[] Abnormal:  Neurologic	Normal: grossly intact  .		[] Abnormal:    LABS                        13.6   12.49 )-----------( 262      ( 01 Dec 2020 07:46 )             42.0                               138    |  101    |  9                   Calcium: 8.8   / iCa: x      (12-01 @ 07:46)    ----------------------------<  158       Magnesium: 2.0                              3.4     |  25     |  0.44             Phosphorous: 2.7      TPro  6.0    /  Alb  2.0    /  TBili  0.9    /  DBili  0.3    /  AST  16     /  ALT  15     /  AlkPhos  68     01 Dec 2020 07:46    CAPILLARY BLOOD GLUCOSE      POCT Blood Glucose.: 159 mg/dL (01 Dec 2020 07:36)  POCT Blood Glucose.: 177 mg/dL (30 Nov 2020 21:05)  POCT Blood Glucose.: 171 mg/dL (30 Nov 2020 16:37)  POCT Blood Glucose.: 126 mg/dL (30 Nov 2020 11:31)        Assesment/plan    Patient is a 59 year old male Haitian speaking from home live with wife walk independantly with a PMH of HTN, HLD, DM, NIKKI, h/o Left Hydrocelectomy on 7/21/2020  who presented with a chief complaint of abdominal pain. Found to have acute pancreatitis and elevated A1c. Pt takes victoza and oral dm meds as out pt.            Problem/Recommendation - 1:  Problem: Diabetes mellitus. Recommendation: poorly controlled as out pt  currently decent on regular diet   D/C VICTOZA out pt meds- due to pancreatitis  consider basal insulin and oral dm meds upon d/c  d/w pts wife over the phone.     Problem/Recommendation - 2:  ·  Problem: Pancreatitis, acute: on regular diet tolerating  per prim team.

## 2020-12-01 NOTE — PROGRESS NOTE ADULT - SUBJECTIVE AND OBJECTIVE BOX
PGY-1 Progress Note discussed with attending    PAGER #: [[phone]] TILL 5:00 PM  PLEASE CONTACT ON CALL TEAM:  - On Call Team (Please refer to Melodie) FROM 5:00 PM - 8:30PM  - Nightfloat Team FROM 8:30 -7:30 AM    CHIEF COMPLAINT & BRIEF HOSPITAL COURSE:     INTERVAL HPI/OVERNIGHT EVENTS:     MEDICATIONS  (STANDING):  atorvastatin 20 milliGRAM(s) Oral at bedtime  dextrose 40% Gel 15 Gram(s) Oral once  enalapril 10 milliGRAM(s) Oral daily  enoxaparin Injectable 40 milliGRAM(s) SubCutaneous daily  gabapentin 100 milliGRAM(s) Oral three times a day  glucagon  Injectable 1 milliGRAM(s) IntraMuscular once  hydrALAZINE 25 milliGRAM(s) Oral every 8 hours  influenza   Vaccine 0.5 milliLiter(s) IntraMuscular once  insulin lispro (ADMELOG) corrective regimen sliding scale   SubCutaneous Before meals and at bedtime  lactated ringers. 1000 milliLiter(s) (100 mL/Hr) IV Continuous <Continuous>  pantoprazole  Injectable 40 milliGRAM(s) IV Push daily  polyethylene glycol 3350 17 Gram(s) Oral daily  potassium chloride    Tablet ER 40 milliEquivalent(s) Oral every 4 hours  senna 2 Tablet(s) Oral at bedtime    MEDICATIONS  (PRN):  acetaminophen   Tablet .. 650 milliGRAM(s) Oral every 6 hours PRN Moderate Pain (4 - 6)  ondansetron Injectable 4 milliGRAM(s) IV Push every 4 hours PRN Nausea and/or Vomiting  oxyCODONE    IR 5 milliGRAM(s) Oral every 4 hours PRN Severe Pain (7 - 10)      REVIEW OF SYSTEMS:  CONSTITUTIONAL: No fever, weight loss, or fatigue  RESPIRATORY: No cough, wheezing, chills or hemoptysis; No shortness of breath  CARDIOVASCULAR: No chest pain, palpitations, dizziness, or leg swelling  GASTROINTESTINAL: No abdominal pain. No nausea, vomiting, or hematemesis; No diarrhea or constipation. No melena or hematochezia.  GENITOURINARY: No dysuria or hematuria, urinary frequency  NEUROLOGICAL: No headaches, memory loss, loss of strength, numbness, or tremors  SKIN: No itching, burning, rashes, or lesions     Vital Signs Last 24 Hrs  T(C): 37.2 (01 Dec 2020 13:33), Max: 37.2 (01 Dec 2020 13:33)  T(F): 98.9 (01 Dec 2020 13:33), Max: 98.9 (01 Dec 2020 13:33)  HR: 78 (01 Dec 2020 13:33) (66 - 78)  BP: 151/56 (01 Dec 2020 13:33) (146/56 - 151/56)  BP(mean): --  RR: 17 (01 Dec 2020 13:33) (17 - 17)  SpO2: 96% (01 Dec 2020 13:33) (96% - 98%)    PHYSICAL EXAMINATION:  GENERAL: NAD, well built  HEAD:  Atraumatic, Normocephalic  EYES:  conjunctiva and sclera clear  NECK: Supple, No JVD, Normal thyroid  CHEST/LUNG: Clear to auscultation. Clear to percussion bilaterally; No rales, rhonchi, wheezing, or rubs  HEART: Regular rate and rhythm; No murmurs, rubs, or gallops  ABDOMEN: Soft, Nontender, Nondistended; Bowel sounds present  NERVOUS SYSTEM:  Alert & Oriented X3,    EXTREMITIES:  2+ Peripheral Pulses, No clubbing, cyanosis, or edema  SKIN: warm dry                          13.6   12.49 )-----------( 262      ( 01 Dec 2020 07:46 )             42.0     12-01    138  |  101  |  9   ----------------------------<  158<H>  3.4<L>   |  25  |  0.44<L>    Ca    8.8      01 Dec 2020 07:46  Phos  2.7     12-01  Mg     2.0     12-01    TPro  6.0  /  Alb  2.0<L>  /  TBili  0.9  /  DBili  0.3<H>  /  AST  16  /  ALT  15  /  AlkPhos  68  12-01    LIVER FUNCTIONS - ( 01 Dec 2020 07:46 )  Alb: 2.0 g/dL / Pro: 6.0 g/dL / ALK PHOS: 68 U/L / ALT: 15 U/L DA / AST: 16 U/L / GGT: x                   CAPILLARY BLOOD GLUCOSE      RADIOLOGY & ADDITIONAL TESTS:                   PGY-1 Progress Note discussed with attending    PAGER #: [[phone]] TILL 5:00 PM  PLEASE CONTACT ON CALL TEAM:  - On Call Team (Please refer to Melodie) FROM 5:00 PM - 8:30PM  - Nightfloat Team FROM 8:30 -7:30 AM    CHIEF COMPLAINT & BRIEF HOSPITAL COURSE:  Patient is 59M, Albanian speaking, lives at home with his wife, walks independently with a PMHx of HTN, HLD, DM, NIKKI, h/o Left Hydrocelectomy on 7/21/2020 who presented with a chief complaint of severe abdominal pain x 1 day. Patient states that it began around 20:00 on 11/24, after eating a dinner of steak and tomatoes. He endorsed severe 9/10 pain, constant, located mostly in the epigastric area radiating to flanks, and associated with nausea, and vomiting. He denies prior episodes of these symptoms, any recent alcohol intake, drug or smoking. He was found to have Ct findings consistent with acute interstitial pancreatitis, BISAP 2, Jnaki's 3. RUQ US was equivocal for gallstones, and HIDA scan negative for cholecystitis/lithiasis. Currently on aggressive IV hydration, and pain control; s/p Zosyn for worsening wbc count, d/c'ed 11/28. Blood cultures negative. Advanced diet as tolerated (now pt is tolerating soft diet) with abdominal pain improving. As USG shows GB sludge, surgery consulted for lap brant possibly this admission, pt is still deciding regarding proceeding with procedure    INTERVAL HPI/OVERNIGHT EVENTS: Communicated with pt using  services #[id number], pt complains of abdominal pain 4/10 intensity, however, improving   from time of admission. Pt tolerating diet well, had shrimp and soup last night and did not have any c/o nausea.        MEDICATIONS  (STANDING):  atorvastatin 20 milliGRAM(s) Oral at bedtime  dextrose 40% Gel 15 Gram(s) Oral once  enalapril 10 milliGRAM(s) Oral daily  enoxaparin Injectable 40 milliGRAM(s) SubCutaneous daily  gabapentin 100 milliGRAM(s) Oral three times a day  glucagon  Injectable 1 milliGRAM(s) IntraMuscular once  hydrALAZINE 25 milliGRAM(s) Oral every 8 hours  influenza   Vaccine 0.5 milliLiter(s) IntraMuscular once  insulin lispro (ADMELOG) corrective regimen sliding scale   SubCutaneous Before meals and at bedtime  lactated ringers. 1000 milliLiter(s) (100 mL/Hr) IV Continuous <Continuous>  pantoprazole  Injectable 40 milliGRAM(s) IV Push daily  polyethylene glycol 3350 17 Gram(s) Oral daily  potassium chloride    Tablet ER 40 milliEquivalent(s) Oral every 4 hours  senna 2 Tablet(s) Oral at bedtime    MEDICATIONS  (PRN):  acetaminophen   Tablet .. 650 milliGRAM(s) Oral every 6 hours PRN Moderate Pain (4 - 6)  ondansetron Injectable 4 milliGRAM(s) IV Push every 4 hours PRN Nausea and/or Vomiting  oxyCODONE    IR 5 milliGRAM(s) Oral every 4 hours PRN Severe Pain (7 - 10)      REVIEW OF SYSTEMS:  CONSTITUTIONAL: No fever, weight loss, or fatigue  RESPIRATORY: No cough, wheezing, chills or hemoptysis; No shortness of breath  CARDIOVASCULAR: No chest pain, palpitations, dizziness, or leg swelling  GASTROINTESTINAL: + abdominal pain. No nausea, vomiting, or hematemesis; No diarrhea or constipation. No melena or hematochezia.  GENITOURINARY: No dysuria or hematuria, urinary frequency  NEUROLOGICAL: No headaches, memory loss, loss of strength, numbness, or tremors  SKIN: No itching, burning, rashes, or lesions     Vital Signs Last 24 Hrs  T(C): 37.2 (01 Dec 2020 13:33), Max: 37.2 (01 Dec 2020 13:33)  T(F): 98.9 (01 Dec 2020 13:33), Max: 98.9 (01 Dec 2020 13:33)  HR: 78 (01 Dec 2020 13:33) (66 - 78)  BP: 151/56 (01 Dec 2020 13:33) (146/56 - 151/56)  BP(mean): --  RR: 17 (01 Dec 2020 13:33) (17 - 17)  SpO2: 96% (01 Dec 2020 13:33) (96% - 98%)    PHYSICAL EXAMINATION:  GENERAL: Pt lying in bed, not in acute distress  HEAD:  Atraumatic, Normocephalic  EYES:  conjunctiva and sclera clear  NECK: Supple, No JVD, Normal thyroid  CHEST/LUNG: Clear to auscultation.  HEART: Regular rate and rhythm; No murmurs, rubs, or gallops  ABDOMEN: Soft, LUQ and epigastric mild tenderness, bowel sounds heard  NERVOUS SYSTEM:  Alert & Oriented X3,    EXTREMITIES:  2+ Peripheral Pulses, No clubbing, cyanosis, or edema  SKIN: warm dry                          13.6   12.49 )-----------( 262      ( 01 Dec 2020 07:46 )             42.0     12-01    138  |  101  |  9   ----------------------------<  158<H>  3.4<L>   |  25  |  0.44<L>    Ca    8.8      01 Dec 2020 07:46  Phos  2.7     12-01  Mg     2.0     12-01    TPro  6.0  /  Alb  2.0<L>  /  TBili  0.9  /  DBili  0.3<H>  /  AST  16  /  ALT  15  /  AlkPhos  68  12-01    LIVER FUNCTIONS - ( 01 Dec 2020 07:46 )  Alb: 2.0 g/dL / Pro: 6.0 g/dL / ALK PHOS: 68 U/L / ALT: 15 U/L DA / AST: 16 U/L / GGT: x                   CAPILLARY BLOOD GLUCOSE      RADIOLOGY & ADDITIONAL TESTS:

## 2020-12-01 NOTE — PROGRESS NOTE ADULT - PROBLEM SELECTOR PLAN 1
- Pt p/w abdominal pain and elevated lipase 11,046 -> downtrended to 240 this AM  - CT Abdomen/Pelvis with IV contrast- acute interstitial pancreatitis. Mild GB wall edema.  - RUQ US shows biliary sludge, no evidence of GS, biliary dilatation   - HIDA Scan done, with 4mg morphine, negative for cholecystitis  - BISAP Score= 2 (BUN>25, Age>60), Janki score: 3  - COLE<3, UDS +'ve for opiates   - Amylase elevated (529) -> downtrended to 31  this AM  - Lipid Panel: Wnl, T, pt on statin and fenofibrate at home  - IgG subsets pending. Will hold sending- RF, HUGO, antismooth muscle antibodies, until more common etiologies ruled out   - C/w Lactated Ringer at a rate of 100 mL/h (for now)  - Strict I&O's with a goal urine output of 1cc/kg/hour  - Close monitoring and correction of electrolyte derangements  - zosyn D/c'ed   - Vital Signs monitoring  - Likely 2/2 Victoza usage for DM vs. passed GB stone  - Surgery consulted as USG showed GB sludge suspicious for a passed GB stone which caused pancreatitis, pt is deciding regarding undergoing brant this admission  - F/u IgG subsets  - GI Dr. Hill, Surgery Dr. Sanon

## 2020-12-01 NOTE — PROGRESS NOTE ADULT - ASSESSMENT
59 male with HTN, HLD, DM admitted with acute pancreatitis. Reports feeling better. Tolerating soft diet. Abdominal pain improving, no nausea or vomiting.     Irish  # [id number]

## 2020-12-01 NOTE — PROGRESS NOTE ADULT - SUBJECTIVE AND OBJECTIVE BOX
Pt seen at bedside  Patient is a 59y old  Male who presents with a chief complaint of pancreatitis (30 Nov 2020 16:21)      INTERVAL HPI/OVERNIGHT EVENTS:  Scottish  # [id number]  States tolerating diet  Denies abd pain, nausea or vomiting  Uncertain about having surgery    Vital Signs Last 24 Hrs  T(C): 36.8 (01 Dec 2020 05:11), Max: 37 (30 Nov 2020 13:45)  T(F): 98.2 (01 Dec 2020 05:11), Max: 98.6 (30 Nov 2020 13:45)  HR: 66 (01 Dec 2020 05:11) (66 - 71)  BP: 147/64 (01 Dec 2020 05:11) (146/56 - 161/59)  BP(mean): --  RR: 17 (01 Dec 2020 05:11) (17 - 17)  SpO2: 98% (01 Dec 2020 05:11) (96% - 98%)    Physical Exam:    Gen: awake, alert oriented NAD  HEENT:  anicteric  Chest: equal chest rise  Abd: soft, NT ND      MEDICATIONS  (STANDING):  atorvastatin 20 milliGRAM(s) Oral at bedtime  dextrose 40% Gel 15 Gram(s) Oral once  enoxaparin Injectable 40 milliGRAM(s) SubCutaneous daily  gabapentin 100 milliGRAM(s) Oral three times a day  glucagon  Injectable 1 milliGRAM(s) IntraMuscular once  hydrALAZINE 75 milliGRAM(s) Oral three times a day  influenza   Vaccine 0.5 milliLiter(s) IntraMuscular once  insulin lispro (ADMELOG) corrective regimen sliding scale   SubCutaneous Before meals and at bedtime  lactated ringers. 1000 milliLiter(s) (100 mL/Hr) IV Continuous <Continuous>  pantoprazole  Injectable 40 milliGRAM(s) IV Push daily  polyethylene glycol 3350 17 Gram(s) Oral daily  potassium chloride    Tablet ER 40 milliEquivalent(s) Oral every 4 hours  senna 2 Tablet(s) Oral at bedtime    MEDICATIONS  (PRN):  acetaminophen   Tablet .. 650 milliGRAM(s) Oral every 6 hours PRN Moderate Pain (4 - 6)  morphine  - Injectable 2 milliGRAM(s) IV Push every 6 hours PRN Severe Pain (7 - 10)  ondansetron Injectable 4 milliGRAM(s) IV Push every 4 hours PRN Nausea and/or Vomiting      Labs:                          13.6   12.49 )-----------( 262      ( 01 Dec 2020 07:46 )             42.0     12-01    138  |  101  |  9   ----------------------------<  158<H>  3.4<L>   |  25  |  0.44<L>    Ca    8.8      01 Dec 2020 07:46  Phos  2.7     12-01  Mg     2.0     12-01    TPro  6.0  /  Alb  2.0<L>  /  TBili  0.9  /  DBili  0.3<H>  /  AST  16  /  ALT  15  /  AlkPhos  68  12-01

## 2020-12-01 NOTE — PROGRESS NOTE ADULT - PROBLEM SELECTOR PLAN 1
gallstone pancreatitis vs medication induced pancreatitis  US revealing GB sludge  WBC downtrending, Lipase and Amylase WNL  Sx consulted and planning for Lap brant this admission however patient having reservations and requested second opinion  GI will sign off  reconsult prn gallstone pancreatitis vs medication induced pancreatitis  US revealing GB sludge  WBC downtrending, Lipase and Amylase WNL  Sx consulted and planning for Lap brant this admission however patient having reservations and requested second opinion  recommend starting actigall   f/u with Dr. Hill at [address] [place] [address] for repeat scan in 3 months to evaluate pancreas

## 2020-12-01 NOTE — PROGRESS NOTE ADULT - ASSESSMENT
Resolved gallstone pancreatitis vs medication induced pancreatitis  -Hypokalemia  1. Pt unsure about having cholecystectomy at this time. Wants second opinion  2. Will d/w surgeon and arrange for second opinion   3. Replete K  4. Will follow

## 2020-12-01 NOTE — PROGRESS NOTE ADULT - PROBLEM SELECTOR PLAN 1
Pt with left upper quadrant abdominal pain which is somatic in nature due to acute pancreatitis. Lipase downtrending. Pancreatitis may be drug-related, as per Endocrinology.   Opioid pain recommendations   - Discontinue Morphine 2mg IV q 6 hours PRN severe pain.   - Start oxycodone 5mg PO q4h PRN severe pain. Monitor for sedation/ respiratory depression.   Non-opioid pain recommendations   - Continue Acetaminophen 650mg PO q 6 hours PRN moderate pain. Monitor LFTs  - Continue Gabapentin 100mg po q 8 hours. Monitor renal function.   Bowel Regimen  - Continue Miralax 17G PO daily  - Continue Senna 2 tablets at bedtime for constipation  Mild pain   - Non-pharmacological pain treatment recommendations  - Warm/ Cool packs PRN   - Repositioning, imagery, relaxation, distraction.  - Physical therapy OOB if no contraindications   Recommendations discussed with primary team and RN.

## 2020-12-01 NOTE — PROGRESS NOTE ADULT - ASSESSMENT
60yo M with HTN, DM with Gallstone pancreatitis  1. Plan for lap brant in AM  2. Hypokalemia - repleted. Recheck BMP in AM  3. NPO after midnight  4. IVF while NPO  5. Please provide medical clearance  6. Consent to be obtained

## 2020-12-01 NOTE — PROGRESS NOTE ADULT - PROBLEM SELECTOR PLAN 4
- Pt has wbc donwtrending 12k this AM, no fevers, abdominal pain improving  - Blood cultures negative   - Zosyn 3.375gm D/c'ed  - HIDA  negative for cholecystitis   - Monitor vitals for now

## 2020-12-01 NOTE — PROGRESS NOTE ADULT - SUBJECTIVE AND OBJECTIVE BOX
Source of information: ELIESER LOPEZ, Chart review  Patient language: Bahamian  : [id number]    HPI:  Patient is a 59 year old male Iranian speaking from home live with wife walk independantly with a PMH of HTN, HLD, DM, NIKKI, h/o Left Hydrocelectomy on 7/21/2020  who presented with a chief complaint of abdominal pain.  Patient states that beginning at approximately 20:00 on the evening of 11/24, after eating dinner of steak and tomatoes. patient endorsed experiencing a severe 9/10 pain, constant, non-radiating. Pt reports pain to be in epigastric and sometimes in RUQ abdominal pain that come sand goes.  Patient denies ever experiencing symptoms such as this before. Pt deneis any recent alcohol intake, drug or smokng.  (25 Nov 2020 04:59)      Patient is a 59y old  Male who presents with a chief complaint of abdominal pain, found to have pancreatitis.  Abd imaging without evidence of necrotizing pancreatitis, abscess or cholecystitis.  Zosyn d/c'd 11/28. Pancreatitis may be drug-related, as per Endocrinology. Pt seen and examined at bedside. Pt laying in bed, reports pain score 4/10 and tolerable  SCALE USED: (1-10 VNRS). States his pain is much better compared to yesterday. Pt describes pain as constant aching, non- radiating,  alleviated by pain medication, exacerbated by movement. Pt tolerating PO soft diet. Denies lethargy, nausea, vomiting, itchiness. Reports constipation, last BM 11/26. Reports passing flatus. Patient stated goal for pain control: to be able to take deep breaths, get out of bed to chair and ambulate with tolerable pain control. Pt denies taking medications for pain at home.     PAST MEDICAL & SURGICAL HISTORY:  NIKKI (obstructive sleep apnea)    Gastritis    Diabetes mellitus    Hyperlipidemia    Hypertension    No significant past surgical history        FAMILY HISTORY:  Family history of diabetes mellitus        Social History:  no alcohol, smoking, drugs,  last drink 1 month ago (25 Nov 2020 04:59)   [X] Denies ETOH use, illicit drug use   [X] former smoking when young adult     Allergies    No Known Allergies    MEDICATIONS  (STANDING):  atorvastatin 20 milliGRAM(s) Oral at bedtime  dextrose 40% Gel 15 Gram(s) Oral once  enoxaparin Injectable 40 milliGRAM(s) SubCutaneous daily  gabapentin 100 milliGRAM(s) Oral three times a day  glucagon  Injectable 1 milliGRAM(s) IntraMuscular once  hydrALAZINE 75 milliGRAM(s) Oral three times a day  influenza   Vaccine 0.5 milliLiter(s) IntraMuscular once  insulin lispro (ADMELOG) corrective regimen sliding scale   SubCutaneous Before meals and at bedtime  lactated ringers. 1000 milliLiter(s) (100 mL/Hr) IV Continuous <Continuous>  pantoprazole  Injectable 40 milliGRAM(s) IV Push daily  polyethylene glycol 3350 17 Gram(s) Oral daily  potassium chloride    Tablet ER 40 milliEquivalent(s) Oral every 4 hours  senna 2 Tablet(s) Oral at bedtime    MEDICATIONS  (PRN):  acetaminophen   Tablet .. 650 milliGRAM(s) Oral every 6 hours PRN Moderate Pain (4 - 6)  ondansetron Injectable 4 milliGRAM(s) IV Push every 4 hours PRN Nausea and/or Vomiting  oxyCODONE    IR 5 milliGRAM(s) Oral every 4 hours PRN Severe Pain (7 - 10)      Vital Signs Last 24 Hrs  T(C): 36.8 (01 Dec 2020 05:11), Max: 37 (30 Nov 2020 13:45)  T(F): 98.2 (01 Dec 2020 05:11), Max: 98.6 (30 Nov 2020 13:45)  HR: 66 (01 Dec 2020 05:11) (66 - 71)  BP: 147/64 (01 Dec 2020 05:11) (146/56 - 161/59)  BP(mean): --  RR: 17 (01 Dec 2020 05:11) (17 - 17)  SpO2: 98% (01 Dec 2020 05:11) (96% - 98%)  COVID-19 PCR: NotDetec (25 Nov 2020 05:02)    LABS: Reviewed                          13.6   12.49 )-----------( 262      ( 01 Dec 2020 07:46 )             42.0     12-01    138  |  101  |  9   ----------------------------<  158<H>  3.4<L>   |  25  |  0.44<L>    Ca    8.8      01 Dec 2020 07:46  Phos  2.7     12-01  Mg     2.0     12-01    TPro  6.0  /  Alb  2.0<L>  /  TBili  0.9  /  DBili  0.3<H>  /  AST  16  /  ALT  15  /  AlkPhos  68  12-01      LIVER FUNCTIONS - ( 01 Dec 2020 07:46 )  Alb: 2.0 g/dL / Pro: 6.0 g/dL / ALK PHOS: 68 U/L / ALT: 15 U/L DA / AST: 16 U/L / GGT: x             CAPILLARY BLOOD GLUCOSE      POCT Blood Glucose.: 146 mg/dL (01 Dec 2020 11:21)  POCT Blood Glucose.: 159 mg/dL (01 Dec 2020 07:36)  POCT Blood Glucose.: 177 mg/dL (30 Nov 2020 21:05)  POCT Blood Glucose.: 171 mg/dL (30 Nov 2020 16:37)    COVID-19 PCR: NotDetec (25 Nov 2020 05:02)      Radiology: Reviewed.       < from: US Hepatic & Pancreatic (11.25.20 @ 14:37) >  EXAM:  US LIVER AND PANCREAS                            PROCEDURE DATE:  11/25/2020          INTERPRETATION:  Pancreatitis. Right-sided abdominal pain.    Right upper quadrant ultrasound.    Gallbladder sludge noted. No gallstones, significant gallbladder wall thickening or pericholecystic fluid. No biliary dilatation. Common duct measures 5 mm. Diffuse fatty liver noted. Pancreas not adequately visualized. Right kidney 13.3 cm with 2 simple cysts largest measuring up to  2.1 cm. No ascites.    Impression: Gallbladder sludge. If there is suspicion for cholecystitis, recommend HIDA scan. No biliary dilatation. Limited evaluation pancreas.            ALEX MEDINA MD; Attending Radiologist  This document has been electronically signed. Nov 25 2020  2:47PM    < end of copied text >      < from: NM Hepatobiliary Imaging (11.27.20 @ 14:07) >  EXAM:  NM HEPATOBILIARY IMG                            PROCEDURE DATE:  11/27/2020          INTERPRETATION:  CLINICAL INFORMATION: 59 year old male with right upper quadrant abdominal pain and pancreatitis, referred to evaluate for acute cholecystitis.    RADIOPHARMACEUTICAL: 3.3 mCi and 2.9 mCi Tc-99m-Mebrofenin, I.V.; 2 doses    TECHNIQUE: Dynamic imaging of the anterior abdomen was performed for 1 hour following injection of radiotracer. Morphine 4 mg I.V. and a second dose of radiotracer were administered at 1 hour.  Dynamic imaging was continued for 1 hour followed by static images of the abdomen in the anterior, right lateral and right anterior oblique projections.    COMPARISON: No previous hepatobiliary scan for comparison    FINDINGS: There is prompt, homogeneous uptake of radiotracer by the hepatocytes. Activity is first seen in the bowel at 20 minutes. The gallbladder is not visualized in the first hour of imaging, but was seen 10 minutes after the administration of morphine.There is good clearance of activity from the liver at the end of the study.    IMPRESSION: Normal morphine-augmented hepatobiliary scan.    No radionuclide evidence of acute cholecystitis.                CARLOS OSWALD MD; Attending Nuclear Medicine  This document has been electronically signed. Nov 27 2020  2:03PM    < end of copied text >    < from: Xray Chest 1 View-PORTABLE IMMEDIATE (Xray Chest 1 View-PORTABLE IMMEDIATE .) (11.25.20 @ 06:26) >    EXAM:  XR CHEST PORTABLE IMMED 1V                            PROCEDURE DATE:  11/25/2020          INTERPRETATION:  Portable chest x-ray    Indication: severe pancreatitis. Evaluation for pleural effusion.    Portable chest x-ray is acquired withouta previous examination for comparison.    Impression: The examination limited by poor inspiratory effort, resulting in vascular crowding. No gross pulmonary consolidation, pleural effusion or pneumothorax    The cardiac silhouette is magnified by AP technique.                  LIONEL REYES MD; Attending Radiologist  This document has been electronically signed. Nov 25 2020  9:46AM    < end of copied text >    ORT Score -   Family Hx of substance abuse	Female	      Male  Alcohol 	                                           1                     3  Illegal drugs	                                   2                     3  Rx drugs                                           4 	                  4  Personal Hx of substance abuse		  Alcohol 	                                          3	                  3  Illegal drugs                                     4	                  4  Rx drugs                                            5 	                  5  Age between 16- 45 years	           1                     1  hx preadolescent sexual abuse	   3 	                  0  Psychological disease		  ADD, OCD, bipolar, schizophrenia   2	          2  Depression                                           1 	          1  Total: 0    a score of 3 or lower indicates low risk for opioid abuse		  a score of 4-7 indicates moderate risk for opioid abuse		  a score of 8 or higher indicates high risk for opioid abuse    REVIEW OF SYSTEMS:  CONSTITUTIONAL: No fever or fatigue  RESPIRATORY: No cough, wheezing, chills or hemoptysis; No shortness of breath  CARDIOVASCULAR: No chest pain, palpitations, dizziness, or leg swelling  GASTROINTESTINAL: + abdominal pain. No nausea (resolved), vomiting; No diarrhea or constipation.   GENITOURINARY: No dysuria, frequency, hematuria, retention or incontinence  MUSCULOSKELETAL: No joint pain or swelling; No muscle, back, or extremity pain, no upper or lower motor strength weakness, no saddle anesthesia, bowel/bladder incontinence  NEURO: No numbness/ tingling in b/l LE   PSYCHIATRIC: No depression, anxiety, mood swings, or difficulty sleeping    PHYSICAL EXAM:  GENERAL:  Alert & Oriented X3, NAD, Good concentration  CHEST/LUNG: Clear to auscultation bilaterally; No rales, rhonchi, wheezing, or rubs  HEART: Regular rate and rhythm; No murmurs, rubs, or gallops  ABDOMEN: Obese, Soft, right upper quadrant appropriately tender, Nondistended; Bowel sounds present  EXTREMITIES:  2+ Peripheral Pulses, No cyanosis, + B/l LE moderate edema  MUSCULOSKELETAL: Motor Strength 5/5 B/L upper and lower extremities; moves all extremities equally against gravity; ROM intact  SKIN: No rashes or lesions    Risk factors associated with adverse outcomes related to opioid treatment  [ ]  Concurrent benzodiazepine use  [ ]  History/ Active substance use or alcohol use disorder  [ ] Psychiatric co-morbidity  [X ] Sleep apnea  [ ] COPD  [ ] BMI> 35  [ ] Liver dysfunction  [ ] Renal dysfunction  [ ] CHF  [X ] Former Smoker  [X ]  Age > 60 years    [X]  [place]  Reviewed and Copied to Chart. See below.    Plan of care and goal oriented pain management treatment options were discussed with patient and /or primary care giver; all questions and concerns were addressed and care was aligned with patient's wishes.    Educated patient on goal oriented pain management treatment options     12-01-20 @ 12:19

## 2020-12-02 ENCOUNTER — RESULT REVIEW (OUTPATIENT)
Age: 60
End: 2020-12-02

## 2020-12-02 DIAGNOSIS — K81.0 ACUTE CHOLECYSTITIS: ICD-10-CM

## 2020-12-02 LAB
ABO RH CONFIRMATION: SIGNIFICANT CHANGE UP
ANION GAP SERPL CALC-SCNC: 11 MMOL/L — SIGNIFICANT CHANGE UP (ref 5–17)
APTT BLD: 28.1 SEC — SIGNIFICANT CHANGE UP (ref 27.5–35.5)
BLD GP AB SCN SERPL QL: SIGNIFICANT CHANGE UP
BUN SERPL-MCNC: 8 MG/DL — SIGNIFICANT CHANGE UP (ref 7–18)
CALCIUM SERPL-MCNC: 9.1 MG/DL — SIGNIFICANT CHANGE UP (ref 8.4–10.5)
CHLORIDE SERPL-SCNC: 99 MMOL/L — SIGNIFICANT CHANGE UP (ref 96–108)
CO2 SERPL-SCNC: 27 MMOL/L — SIGNIFICANT CHANGE UP (ref 22–31)
CREAT SERPL-MCNC: 0.53 MG/DL — SIGNIFICANT CHANGE UP (ref 0.5–1.3)
GLUCOSE BLDC GLUCOMTR-MCNC: 154 MG/DL — HIGH (ref 70–99)
GLUCOSE BLDC GLUCOMTR-MCNC: 162 MG/DL — HIGH (ref 70–99)
GLUCOSE BLDC GLUCOMTR-MCNC: 166 MG/DL — HIGH (ref 70–99)
GLUCOSE BLDC GLUCOMTR-MCNC: 181 MG/DL — HIGH (ref 70–99)
GLUCOSE SERPL-MCNC: 169 MG/DL — HIGH (ref 70–99)
HCT VFR BLD CALC: 43.8 % — SIGNIFICANT CHANGE UP (ref 39–50)
HGB BLD-MCNC: 13.7 G/DL — SIGNIFICANT CHANGE UP (ref 13–17)
INR BLD: 1.11 RATIO — SIGNIFICANT CHANGE UP (ref 0.88–1.16)
MAGNESIUM SERPL-MCNC: 2 MG/DL — SIGNIFICANT CHANGE UP (ref 1.6–2.6)
MCHC RBC-ENTMCNC: 27.7 PG — SIGNIFICANT CHANGE UP (ref 27–34)
MCHC RBC-ENTMCNC: 31.3 GM/DL — LOW (ref 32–36)
MCV RBC AUTO: 88.7 FL — SIGNIFICANT CHANGE UP (ref 80–100)
NRBC # BLD: 0 /100 WBCS — SIGNIFICANT CHANGE UP (ref 0–0)
PHOSPHATE SERPL-MCNC: 3.5 MG/DL — SIGNIFICANT CHANGE UP (ref 2.5–4.5)
PLATELET # BLD AUTO: 310 K/UL — SIGNIFICANT CHANGE UP (ref 150–400)
POTASSIUM SERPL-MCNC: 4.2 MMOL/L — SIGNIFICANT CHANGE UP (ref 3.5–5.3)
POTASSIUM SERPL-SCNC: 4.2 MMOL/L — SIGNIFICANT CHANGE UP (ref 3.5–5.3)
PROTHROM AB SERPL-ACNC: 13.1 SEC — SIGNIFICANT CHANGE UP (ref 10.6–13.6)
RBC # BLD: 4.94 M/UL — SIGNIFICANT CHANGE UP (ref 4.2–5.8)
RBC # FLD: 14.2 % — SIGNIFICANT CHANGE UP (ref 10.3–14.5)
SODIUM SERPL-SCNC: 137 MMOL/L — SIGNIFICANT CHANGE UP (ref 135–145)
WBC # BLD: 10.17 K/UL — SIGNIFICANT CHANGE UP (ref 3.8–10.5)
WBC # FLD AUTO: 10.17 K/UL — SIGNIFICANT CHANGE UP (ref 3.8–10.5)

## 2020-12-02 PROCEDURE — 47563 LAPARO CHOLECYSTECTOMY/GRAPH: CPT

## 2020-12-02 PROCEDURE — 88304 TISSUE EXAM BY PATHOLOGIST: CPT | Mod: 26

## 2020-12-02 PROCEDURE — 99233 SBSQ HOSP IP/OBS HIGH 50: CPT | Mod: GC

## 2020-12-02 RX ORDER — SODIUM CHLORIDE 9 MG/ML
1000 INJECTION, SOLUTION INTRAVENOUS
Refills: 0 | Status: DISCONTINUED | OUTPATIENT
Start: 2020-12-02 | End: 2020-12-03

## 2020-12-02 RX ORDER — SODIUM CHLORIDE 9 MG/ML
1000 INJECTION, SOLUTION INTRAVENOUS
Refills: 0 | Status: DISCONTINUED | OUTPATIENT
Start: 2020-12-02 | End: 2020-12-02

## 2020-12-02 RX ORDER — CHLORHEXIDINE GLUCONATE 213 G/1000ML
1 SOLUTION TOPICAL DAILY
Refills: 0 | Status: DISCONTINUED | OUTPATIENT
Start: 2020-12-02 | End: 2020-12-02

## 2020-12-02 RX ORDER — OXYCODONE AND ACETAMINOPHEN 5; 325 MG/1; MG/1
1 TABLET ORAL EVERY 4 HOURS
Refills: 0 | Status: DISCONTINUED | OUTPATIENT
Start: 2020-12-02 | End: 2020-12-03

## 2020-12-02 RX ORDER — ACETAMINOPHEN 500 MG
1000 TABLET ORAL ONCE
Refills: 0 | Status: DISCONTINUED | OUTPATIENT
Start: 2020-12-02 | End: 2020-12-02

## 2020-12-02 RX ORDER — ENOXAPARIN SODIUM 100 MG/ML
40 INJECTION SUBCUTANEOUS DAILY
Refills: 0 | Status: DISCONTINUED | OUTPATIENT
Start: 2020-12-02 | End: 2020-12-03

## 2020-12-02 RX ORDER — SENNA PLUS 8.6 MG/1
2 TABLET ORAL AT BEDTIME
Refills: 0 | Status: DISCONTINUED | OUTPATIENT
Start: 2020-12-02 | End: 2020-12-03

## 2020-12-02 RX ORDER — DEXTROSE 50 % IN WATER 50 %
15 SYRINGE (ML) INTRAVENOUS ONCE
Refills: 0 | Status: DISCONTINUED | OUTPATIENT
Start: 2020-12-02 | End: 2020-12-03

## 2020-12-02 RX ORDER — GLUCAGON INJECTION, SOLUTION 0.5 MG/.1ML
1 INJECTION, SOLUTION SUBCUTANEOUS ONCE
Refills: 0 | Status: DISCONTINUED | OUTPATIENT
Start: 2020-12-02 | End: 2020-12-03

## 2020-12-02 RX ORDER — PANTOPRAZOLE SODIUM 20 MG/1
40 TABLET, DELAYED RELEASE ORAL DAILY
Refills: 0 | Status: DISCONTINUED | OUTPATIENT
Start: 2020-12-02 | End: 2020-12-03

## 2020-12-02 RX ORDER — MORPHINE SULFATE 50 MG/1
2 CAPSULE, EXTENDED RELEASE ORAL ONCE
Refills: 0 | Status: DISCONTINUED | OUTPATIENT
Start: 2020-12-02 | End: 2020-12-02

## 2020-12-02 RX ORDER — ATORVASTATIN CALCIUM 80 MG/1
20 TABLET, FILM COATED ORAL AT BEDTIME
Refills: 0 | Status: DISCONTINUED | OUTPATIENT
Start: 2020-12-02 | End: 2020-12-03

## 2020-12-02 RX ORDER — ONDANSETRON 8 MG/1
4 TABLET, FILM COATED ORAL ONCE
Refills: 0 | Status: DISCONTINUED | OUTPATIENT
Start: 2020-12-02 | End: 2020-12-02

## 2020-12-02 RX ORDER — ACETAMINOPHEN 500 MG
650 TABLET ORAL EVERY 6 HOURS
Refills: 0 | Status: DISCONTINUED | OUTPATIENT
Start: 2020-12-02 | End: 2020-12-03

## 2020-12-02 RX ORDER — DEXTROSE 50 % IN WATER 50 %
25 SYRINGE (ML) INTRAVENOUS ONCE
Refills: 0 | Status: DISCONTINUED | OUTPATIENT
Start: 2020-12-02 | End: 2020-12-03

## 2020-12-02 RX ORDER — MORPHINE SULFATE 50 MG/1
4 CAPSULE, EXTENDED RELEASE ORAL EVERY 4 HOURS
Refills: 0 | Status: DISCONTINUED | OUTPATIENT
Start: 2020-12-02 | End: 2020-12-03

## 2020-12-02 RX ORDER — GABAPENTIN 400 MG/1
100 CAPSULE ORAL THREE TIMES A DAY
Refills: 0 | Status: DISCONTINUED | OUTPATIENT
Start: 2020-12-02 | End: 2020-12-03

## 2020-12-02 RX ORDER — INSULIN LISPRO 100/ML
VIAL (ML) SUBCUTANEOUS EVERY 6 HOURS
Refills: 0 | Status: DISCONTINUED | OUTPATIENT
Start: 2020-12-02 | End: 2020-12-03

## 2020-12-02 RX ORDER — ONDANSETRON 8 MG/1
4 TABLET, FILM COATED ORAL EVERY 4 HOURS
Refills: 0 | Status: DISCONTINUED | OUTPATIENT
Start: 2020-12-02 | End: 2020-12-03

## 2020-12-02 RX ORDER — HYDROMORPHONE HYDROCHLORIDE 2 MG/ML
0.5 INJECTION INTRAMUSCULAR; INTRAVENOUS; SUBCUTANEOUS
Refills: 0 | Status: DISCONTINUED | OUTPATIENT
Start: 2020-12-02 | End: 2020-12-02

## 2020-12-02 RX ORDER — INSULIN LISPRO 100/ML
VIAL (ML) SUBCUTANEOUS EVERY 6 HOURS
Refills: 0 | Status: DISCONTINUED | OUTPATIENT
Start: 2020-12-02 | End: 2020-12-02

## 2020-12-02 RX ORDER — POLYETHYLENE GLYCOL 3350 17 G/17G
17 POWDER, FOR SOLUTION ORAL DAILY
Refills: 0 | Status: DISCONTINUED | OUTPATIENT
Start: 2020-12-02 | End: 2020-12-03

## 2020-12-02 RX ORDER — HYDRALAZINE HCL 50 MG
25 TABLET ORAL EVERY 8 HOURS
Refills: 0 | Status: DISCONTINUED | OUTPATIENT
Start: 2020-12-02 | End: 2020-12-03

## 2020-12-02 RX ORDER — DEXTROSE 50 % IN WATER 50 %
12.5 SYRINGE (ML) INTRAVENOUS ONCE
Refills: 0 | Status: DISCONTINUED | OUTPATIENT
Start: 2020-12-02 | End: 2020-12-03

## 2020-12-02 RX ADMIN — PANTOPRAZOLE SODIUM 40 MILLIGRAM(S): 20 TABLET, DELAYED RELEASE ORAL at 12:04

## 2020-12-02 RX ADMIN — Medication 1: at 06:14

## 2020-12-02 RX ADMIN — MORPHINE SULFATE 2 MILLIGRAM(S): 50 CAPSULE, EXTENDED RELEASE ORAL at 10:26

## 2020-12-02 RX ADMIN — SENNA PLUS 2 TABLET(S): 8.6 TABLET ORAL at 21:50

## 2020-12-02 RX ADMIN — Medication 1: at 12:05

## 2020-12-02 RX ADMIN — SODIUM CHLORIDE 100 MILLILITER(S): 9 INJECTION, SOLUTION INTRAVENOUS at 12:04

## 2020-12-02 RX ADMIN — GABAPENTIN 100 MILLIGRAM(S): 400 CAPSULE ORAL at 21:50

## 2020-12-02 RX ADMIN — ENOXAPARIN SODIUM 40 MILLIGRAM(S): 100 INJECTION SUBCUTANEOUS at 21:50

## 2020-12-02 RX ADMIN — Medication 25 MILLIGRAM(S): at 21:50

## 2020-12-02 RX ADMIN — MORPHINE SULFATE 2 MILLIGRAM(S): 50 CAPSULE, EXTENDED RELEASE ORAL at 10:56

## 2020-12-02 RX ADMIN — Medication 650 MILLIGRAM(S): at 03:23

## 2020-12-02 RX ADMIN — CHLORHEXIDINE GLUCONATE 1 APPLICATION(S): 213 SOLUTION TOPICAL at 08:29

## 2020-12-02 RX ADMIN — Medication 10 MILLIGRAM(S): at 05:43

## 2020-12-02 RX ADMIN — Medication 20 MILLIGRAM(S): at 21:50

## 2020-12-02 RX ADMIN — ATORVASTATIN CALCIUM 20 MILLIGRAM(S): 80 TABLET, FILM COATED ORAL at 21:50

## 2020-12-02 RX ADMIN — Medication 25 MILLIGRAM(S): at 05:43

## 2020-12-02 RX ADMIN — Medication 650 MILLIGRAM(S): at 02:01

## 2020-12-02 RX ADMIN — GABAPENTIN 100 MILLIGRAM(S): 400 CAPSULE ORAL at 05:43

## 2020-12-02 NOTE — PROGRESS NOTE ADULT - SUBJECTIVE AND OBJECTIVE BOX
Interval Events:      Allergies    No Known Allergies    Intolerances      Endocrine/Metabolic Medications:  atorvastatin 20 milliGRAM(s) Oral at bedtime  dextrose 40% Gel 15 Gram(s) Oral once  glucagon  Injectable 1 milliGRAM(s) IntraMuscular once  insulin lispro (ADMELOG) corrective regimen sliding scale   SubCutaneous every 6 hours      Vital Signs Last 24 Hrs  T(C): 36.7 (02 Dec 2020 05:35), Max: 37.2 (01 Dec 2020 13:33)  T(F): 98.1 (02 Dec 2020 05:35), Max: 98.9 (01 Dec 2020 13:33)  HR: 67 (02 Dec 2020 05:35) (67 - 78)  BP: 155/64 (02 Dec 2020 05:35) (151/56 - 155/64)  BP(mean): --  RR: 17 (02 Dec 2020 05:35) (17 - 17)  SpO2: 99% (02 Dec 2020 05:35) (96% - 99%)      PHYSICAL EXAM  All physical exam findings normal, except those marked:  General:	Alert, active, cooperative, NAD, well hydrated  .		[] Abnormal:  Neck		Normal: supple, no cervical adenopathy, no palpable thyroid  .		[] Abnormal:  Cardiovascular	Normal: regular rate, normal S1, S2, no murmurs  .		[] Abnormal:  Respiratory	Normal: no chest wall deformity, normal respiratory pattern, CTA B/L  .		[] Abnormal:  Abdominal	Normal: soft, ND, NT, bowel sounds present, no masses, no organomegaly  .		[] Abnormal:  		Normal normal genitalia, testes descended, circumcised/uncircumcised  .		Betty stage:			Breast betty:  .		Menstrual history:  .		[] Abnormal:  Extremities	Normal: FROM x4  .		[] Abnormal:  Skin		Normal: intact and not indurated, no rash, no acanthosis nigricans  .		[] Abnormal:  Neurologic	Normal: grossly intact  .		[] Abnormal:    LABS                        13.7   10.17 )-----------( 310      ( 02 Dec 2020 06:30 )             43.8                               137    |  99     |  8                   Calcium: 9.1   / iCa: x      (12-02 @ 06:30)    ----------------------------<  169       Magnesium: 2.0                              4.2     |  27     |  0.53             Phosphorous: 3.5        CAPILLARY BLOOD GLUCOSE      POCT Blood Glucose.: 166 mg/dL (02 Dec 2020 06:12)  POCT Blood Glucose.: 189 mg/dL (01 Dec 2020 21:35)  POCT Blood Glucose.: 170 mg/dL (01 Dec 2020 16:36)  POCT Blood Glucose.: 146 mg/dL (01 Dec 2020 11:21)        Assesment/plan       Interval Events:  pt in nad  npo for cholecystectomy     Allergies    No Known Allergies    Intolerances      Endocrine/Metabolic Medications:  atorvastatin 20 milliGRAM(s) Oral at bedtime  dextrose 40% Gel 15 Gram(s) Oral once  glucagon  Injectable 1 milliGRAM(s) IntraMuscular once  insulin lispro (ADMELOG) corrective regimen sliding scale   SubCutaneous every 6 hours      Vital Signs Last 24 Hrs  T(C): 36.7 (02 Dec 2020 05:35), Max: 37.2 (01 Dec 2020 13:33)  T(F): 98.1 (02 Dec 2020 05:35), Max: 98.9 (01 Dec 2020 13:33)  HR: 67 (02 Dec 2020 05:35) (67 - 78)  BP: 155/64 (02 Dec 2020 05:35) (151/56 - 155/64)  BP(mean): --  RR: 17 (02 Dec 2020 05:35) (17 - 17)  SpO2: 99% (02 Dec 2020 05:35) (96% - 99%)      PHYSICAL EXAM  All physical exam findings normal, except those marked:  General:	Alert, active, cooperative, NAD, well hydrated  .		[] Abnormal:  Neck		Normal: supple, no cervical adenopathy, no palpable thyroid  .		[] Abnormal:  Cardiovascular	Normal: regular rate, normal S1, S2, no murmurs  .		[] Abnormal:  Respiratory	Normal: no chest wall deformity, normal respiratory pattern, CTA B/L  .		[] Abnormal:  Abdominal	Normal: soft, ND, NT, bowel sounds present, no masses, no organomegaly  .		[] Abnormal:  		Normal normal genitalia, testes descended, circumcised/uncircumcised  .		Betty stage:			Breast betty:  .		Menstrual history:  .		[] Abnormal:  Extremities	Normal: FROM x4  .		[] Abnormal:  Skin		Normal: intact and not indurated, no rash, no acanthosis nigricans  .		[] Abnormal:  Neurologic	Normal: grossly intact  .		[] Abnormal:    LABS                        13.7   10.17 )-----------( 310      ( 02 Dec 2020 06:30 )             43.8                               137    |  99     |  8                   Calcium: 9.1   / iCa: x      (12-02 @ 06:30)    ----------------------------<  169       Magnesium: 2.0                              4.2     |  27     |  0.53             Phosphorous: 3.5        CAPILLARY BLOOD GLUCOSE      POCT Blood Glucose.: 166 mg/dL (02 Dec 2020 06:12)  POCT Blood Glucose.: 189 mg/dL (01 Dec 2020 21:35)  POCT Blood Glucose.: 170 mg/dL (01 Dec 2020 16:36)  POCT Blood Glucose.: 146 mg/dL (01 Dec 2020 11:21)        Assesment/plan    Patient is a 59 year old male Qatari speaking from home live with wife walk independantly with a PMH of HTN, HLD, DM, NIKKI, h/o Left Hydrocelectomy on 7/21/2020  who presented with a chief complaint of abdominal pain. Found to have acute pancreatitis and elevated A1c. Pt takes victoza and oral dm meds as out pt.            Problem/Recommendation - 1:  Problem: Diabetes mellitus: poorly controlled as out pt  currently decent - tNPO for cholecystectomy today    D/C VICTOZA out pt meds- due to pancreatitis  consider basal insulin and oral dm meds upon d/c  d/w pts wife over the phone.     Problem/Recommendation - 2:  ·  Problem: Pancreatitis, acute: awaiting cholecystectomy today  .

## 2020-12-02 NOTE — PROGRESS NOTE ADULT - ATTENDING COMMENTS
Patient re-evaluated, including chart review and bedside examination.
Patient re-evaluated, including review of the chart and bedside examination.
Patient seen and examined with Housestaff and Medical students; Agree with PGY1 A/P above with editing as needed. My independent assessment, findings on exam, diagnosis and plan of care as listed below. Discussed with Dr. Barajas and PGY3 Dr. Harrell    H[place]uran  used     Patient admitted with Pancreatitis initially suspected drug induced but Imaging showing GB sludge. scheduled for lap. Lavinia  Patient doing better; no significant abdominal pain. No fever or cough or chest pain or SOB. No nausea or vomit today.     Vital Signs Last 24 Hrs  T(C): 36.7 (02 Dec 2020 19:40), Max: 36.9 (01 Dec 2020 20:57)  T(F): 98 (02 Dec 2020 19:40), Max: 98.4 (01 Dec 2020 20:57)  HR: 70 (02 Dec 2020 19:40) (65 - 81)  BP: 140/64 (02 Dec 2020 19:40) (138/68 - 158/68)  BP(mean): 84 (02 Dec 2020 19:40) (84 - 93)  RR: 18 (02 Dec 2020 19:40) (12 - 20)  SpO2: 99% (02 Dec 2020 19:40) (95% - 99%)    P/E:  GENERAL: NAD  NERVOUS SYSTEM:  Alert & Oriented X3, Power 5/5 B/L upper and lower extremities  CHEST/LUNG: Clear to auscultation bilaterally; No rales, rhonchi, wheezing, or rubs  HEART: Regular rate and rhythm; No murmurs, rubs, or gallops  ABDOMEN: Soft, no tenderness, Nondistended; Bowel sounds present  EXTREMITIES:  2+ Peripheral Pulses, No clubbing, cyanosis, or edema  SKIN: No rashes or lesions    labs:                        13.7   10.17 )-----------( 310      ( 02 Dec 2020 06:30 )             43.8   12-02    137  |  99  |  8   ----------------------------<  169<H>  4.2   |  27  |  0.53    Ca    9.1      02 Dec 2020 06:30  Phos  3.5     12-02  Mg     2.0     12-02    TPro  6.0  /  Alb  2.0<L>  /  TBili  0.9  /  DBili  0.3<H>  /  AST  16  /  ALT  15  /  AlkPhos  68  12-01    Transthoracic Echocardiogram (12.02.20 @ 07:38)     CONCLUSIONS:  1. Trace mitral regurgitation.  2.  Trace aortic regurgitation.  3. Normal left ventricular internal dimensions and wall thicknesses.  4. Endocardium not well visualized; grossly normal left  ventricular systolic function.  5. Normal right ventricular size and function.    A/P:  Acute pancreatitis Gallstone related likely with possible drug induced although less likely  Uncontrolled DM (A1c 9)  HTN controlled  HLD       Plan:  Lap Lavinia today; Dr. Estrella; patient agreed   Resume diet as per surgery after Lap Lavinia    Patient is asymptomatic form a Cardiopulmonary  standpoint with no chest pain or SOB at rest; at baseline ambulate independently and no significant cardiac history; Patient is optimized at moderate risk for planned surgical intervention; Echo WNL    Lantus plus oral hypoglycemic on discharge; Will hold off Victoza; Endo f/u appreciated  Zofran for nausea; Monitor electrolytes and supplement accordingly  Cont Insulin sliding scale for now   Cont current meds for HTN with holding parameters   Lovenox for DVT ppx resume after OR as per Sx    Discussed with Patient at length with  phone ; All questions answered; verbalized understanding and agree to proceed with sx  Discussed with PGY1 Dr. Barajas and PGY3 Dr. Harrell and Sx PA and Attending and RN
Patient seen and examined with Housestaff and Medical students; Agree with PGY1 A/P above with editing as needed. My independent assessment, findings on exam, diagnosis and plan of care as listed below. Discussed with Dr. Barajas and PGY3 Dr. Harrell    Panamanian  used as above     Patient admitted with Pancreatitis initially suspected drug induced but Imaging showing GB sludge. \    Patient doing better; much improved pain still some residual LUQ pain. No fever or cough or chest pain or SOB. No nausea or vomit today.     Vital Signs Last 24 Hrs  T(C): 37.2 (01 Dec 2020 13:33), Max: 37.2 (01 Dec 2020 13:33)  T(F): 98.9 (01 Dec 2020 13:33), Max: 98.9 (01 Dec 2020 13:33)  HR: 78 (01 Dec 2020 13:33) (66 - 78)  BP: 151/56 (01 Dec 2020 13:33) (146/56 - 151/56)  RR: 17 (01 Dec 2020 13:33) (17 - 17)  SpO2: 96% (01 Dec 2020 13:33) (96% - 98%)    P/E:  GENERAL: NAD  NERVOUS SYSTEM:  Alert & Oriented X3, Power 5/5 B/L upper and lower extremities  CHEST/LUNG: Clear to auscultation bilaterally; No rales, rhonchi, wheezing, or rubs  HEART: Regular rate and rhythm; No murmurs, rubs, or gallops  ABDOMEN: Soft, mild epigastric and LUQ tenderness, Nondistended; Bowel sounds present  EXTREMITIES:  2+ Peripheral Pulses, No clubbing, cyanosis, or edema  SKIN: No rashes or lesions    A/P:  Acute pancreatitis possibly drug induced but could be Gallstone related   Uncontrolled DM (A1c 9)  HTN controlled  HLD       Plan:  Patient tolerating diet now; pain much improved; may hold IVF;   Spoke with Patient at length and reviewed pathophysiology which may have ppt the Pancreatitis; Given sludge seen on imaging could have been obstruction form sludge or even a passed stone and as d/w GI and Surgery at risk of recurrent Pancreatitis and even Cholangitis with obstruction he may bendfit form Surgical intervention and Laparoscopic cholecystectomy. Patient verbalized understanding; d/w Dr. Sanon.   Meanwhile later this afternoon sister visited at bedside and as she worked with Dr. Estrella requested to be operated by him. Informed Dr. Sanon; Spoke with Surgical PA Adry; will preop for possible OR tomorrow if Dr. Estrella schedule permit.   Keep NPO after Midnight;     Patient is asymptomatic form a Cardiopulmonary  standpoint with no chest pain or SOB at rest; at baseline ambulate independently and no significant cardiac history; Patient is optimized at moderate risk for planned surgical intervention; if feasible will get a preop. Echo in AM but should not interfere proceeding with surgery.     I also d/w Patient for need for Basal and Bolus Insulin to ideally control poorly controlled Diabetes; He was asking if he can come off Insulin in the future which I counseled him possible but cannot guaranteed but need better control of sugars to prevent complications associated   WBC trending down;;   Zofran for nausea   Monitor electrolytes and supplement accordingly  Follow up IgG levels  Cont Insulin sliding scale for now   Endocrine consult appreciated   Cont current meds for HTN with holding parameters   Lovenox for DVT ppx; Hold in AM    Discussed with Patient at length with  phone as well as later with sister ay bedside.  Discussed with PGY1 Dr. Hopper and PGY3 Dr. Harrell and Sx PA and Attending and RN
Patient was seen and examined at bedside   Looks in less distress than yesterday  Still complains of pain but improved from yesterday    Vitals stable, afebrile, no tachycardia     P/E:   In mild distress due to pain, not tachypneic anymore  AAOX3, no focal deficit   S1S2 WNL, no MRG   BL clear breath sound   Abd soft, tenderness in epigastric area improved, sluggish bowel sound, Davies's sign neg   BL LE no calf tenderness or edema     Labs noted, trending down leukocytosis, LDH elevated    A/P  Acute pancreatitis possibly drug induced ( Fibrate, SGLT2 inhibitors)  Uncontrolled DM  HTN  HLD       Plan:  Cont Zosyn, follow blood cultures, discussed with Dr Ben Bell current pain management, IVF  Advance diet as tolerated, Zofran for nausea   Follow up IgG levels  Follow up with GI   Cont Insulin sliding scale  Endocrine consult  Cont current meds for HTN with holding parameters   HIDA scan neg  Low threshold for ICU consult   Lovenox for DVT ppx   Full code
Patient was seen and examined at bedside   Clavister  used [id number]  Still complains of pain when medication wears off   Denies nausea, vomiting  Had increased pain after CLD last night, but tolerated breakfast     Vitals stable     P/E:  GENERAL: NAD, sitting on chair comfortably  NERVOUS SYSTEM:  Alert & Oriented X3, Good concentration; Motor Strength 5/5 B/L upper and lower extremities  CHEST/LUNG: Clear to auscultation bilaterally; No rales, rhonchi, wheezing, or rubs  HEART: Regular rate and rhythm; No murmurs, rubs, or gallops  ABDOMEN: Soft, mild epigastric tenderness, Nondistended; Bowel sounds present  EXTREMITIES:  2+ Peripheral Pulses, No clubbing, cyanosis, or edema  SKIN: No rashes or lesions    A/P:  Acute pancreatitis possibly drug induced ( Fibrate, SGLT2 inhibitors)  Uncontrolled DM  HTN  HLD       Plan:  Cont IVF, and diet Clear liquid   Add Tramadol for moderate pain   WBC trending down  Zofran for nausea   Follow up IgG levels  Follow up with GI recs  Cont Insulin sliding scale for now   Endocrine consult appreciated   Cont current meds for HTN with holding parameters   Lovenox for DVT ppx   Full code
Patient was seen and examined at bedside   Sudanese  used as above   Pain better today  Denies nausea, vomiting    Vitals stable     P/E:  GENERAL: NAD  NERVOUS SYSTEM:  Alert & Oriented X3, Good concentration; Motor Strength 5/5 B/L upper and lower extremities  CHEST/LUNG: Clear to auscultation bilaterally; No rales, rhonchi, wheezing, or rubs  HEART: Regular rate and rhythm; No murmurs, rubs, or gallops  ABDOMEN: Soft, mild epigastric and LUQ tenderness, Nondistended; Bowel sounds present  EXTREMITIES:  2+ Peripheral Pulses, No clubbing, cyanosis, or edema  SKIN: No rashes or lesions    A/P:  Acute pancreatitis possibly drug induced ( Victoza, Fibrate, SGLT2 inhibitors)  Uncontrolled DM  HTN  HLD       Plan:  Cont IVF, and advance diet   Appreciate pain management consult   WBC stable   Zofran for nausea   Monitor electrolytes and supplement accordingly  Follow up IgG levels  Appreciate GI recs, surgery consult for cholecystectomy due to GB sludge  Cont Insulin sliding scale for now   Endocrine consult appreciated   Cont current meds for HTN with holding parameters   Lovenox for DVT ppx   Full code
Patient was seen and examined at bedside   Still complains of severe abdominal pain, 8/10 today, was 10/10 yesterday  Complains of nausea     Vitals stable, afebrile, no tachycardia     P/E:   In moderate distress due to pain, mildly tachypneic   AAOX3, no focal deficit   S1S2 WNL, no MRG   BL clear breath sound   Abd soft, tenderness and rebound tenderness in epigastric area, sluggish bowel sound, Davies's sign neg   BL LE no calf tenderness or edema     Labs noted, worsening leukocytosis, Hct 45, BUN/Cr 16/0.71, calcium 8.7, TG 71, HC03 23     A/P  Acute pancreatitis possibly drug induced ( Fibrate, SGLT2 inhibitors)  Uncontrolled DM  HTN  HLD       Plan:  Start Zosyn, with stat dose, discussed with Dr Ben Bell current pain management, IVF, NPO, Zofran for nausea   Monitor vitals closely  Cont Insulin sliding scale  Cont current meds for HTN with holding parameters   HIDA scan vs CT abd with contrast tomorrow   Low threshold for ICU consult   Lovenox for DVT ppx   Full code

## 2020-12-02 NOTE — CHART NOTE - NSCHARTNOTEFT_GEN_A_CORE
Patient clinically stable; Vitals reviewed stable  Echo WNL    Transthoracic Echocardiogram (12.02.20 @ 07:38)    CONCLUSIONS:  1. Trace mitral regurgitation.  2.  Trace aortic regurgitation.  3. Normal left ventricular internal dimensions and wall thicknesses.  4. Endocardium not well visualized; grossly normal left  ventricular systolic function.  5. Normal right ventricular size and function.      Plan:  Discussed with ADELINE kent; patient optimized at low to moderate risk for planned laparoscopic cholecystectomy  NPO; IV Fluids

## 2020-12-02 NOTE — PROGRESS NOTE ADULT - PROBLEM SELECTOR PLAN 1
- Pt p/w abdominal pain and elevated lipase 11,046 -> downtrended to 240  - CT Abdomen/Pelvis with IV contrast- acute interstitial pancreatitis. Mild GB wall edema.  - RUQ US shows biliary sludge, no evidence of GS, biliary dilatation   - HIDA Scan done, with 4mg morphine, negative for cholecystitis  - BISAP Score= 2 (BUN>25, Age>60), Janki score: 3  - COLE<3, UDS +'ve for opiates   - Amylase elevated (529) -> downtrended to 31    - Lipid Panel: Wnl, T, pt on statin and fenofibrate at home  - IgG subsets pending. Will hold sending- RF, HUGO, antismooth muscle antibodies, until more common etiologies ruled out   - C/w Lactated Ringer at a rate of 100 mL/h (for now)  - Close monitoring and correction of electrolyte derangements  - Zosyn D/c'ed as blood cultures were negative  - Likely 2/2 Victoza usage for DM vs. passed GB stone  - Surgery consulted as USG showed GB sludge suspicious for a passed GB stone which caused pancreatitis, pt undergoing Lap brant today after being medically cleared  - Echo showing Grossly normal LV function, EF > 55%, Trace AR, Trace MR  - F/u IgG subsets  - GI Dr. Hill, Surgery Dr. Brown

## 2020-12-02 NOTE — PROGRESS NOTE ADULT - PROBLEM SELECTOR PLAN 7
RISK                                                          Points  [] Previous VTE                                           3  [] Thrombophilia                                        2  [] Lower limb paralysis                              2   [] Current Cancer                                       2   [x] Immobilization > 24 hrs                        1  [] ICU/CCU stay > 24 hours                       1  [x] Age > 60                                                   1    sc lovenox
RISK                                                          Points  [] Previous VTE                                           3  [] Thrombophilia                                        2  [] Lower limb paralysis                              2   [] Current Cancer                                       2   [x] Immobilization > 24 hrs                        1  [] ICU/CCU stay > 24 hours                       1  [x] Age > 60                                                   1    sc lovenox held today in light of scheduled brant today

## 2020-12-02 NOTE — PROGRESS NOTE ADULT - PROBLEM SELECTOR PLAN 2
- Holding patient's home medication enalapril (reports of DIP)  - Hydralazine 75 mg TID- downtitrated to 25 mg BID, and resumed pt's home med Enalapril uptitrated from 10 mg to 20 mg (home dose 20 mg)  - BP ranging 140-150's/60-70s  - Will work on titrating off Hydralazine tomorrow 12/3

## 2020-12-02 NOTE — PROGRESS NOTE ADULT - PROBLEM SELECTOR PROBLEM 7
Hypertension
Prophylactic measure

## 2020-12-02 NOTE — PROGRESS NOTE ADULT - SUBJECTIVE AND OBJECTIVE BOX
PGY-1 Progress Note discussed with attending    PAGER #: [[phone]] TILL 5:00 PM  PLEASE CONTACT ON CALL TEAM:  - On Call Team (Please refer to Melodie) FROM 5:00 PM - 8:30PM  - Nightfloat Team FROM 8:30 -7:30 AM    CHIEF COMPLAINT & BRIEF HOSPITAL COURSE:    INTERVAL HPI/OVERNIGHT EVENTS:   MEDICATIONS  (STANDING):  atorvastatin 20 milliGRAM(s) Oral at bedtime  chlorhexidine 2% Cloths 1 Application(s) Topical daily  dextrose 40% Gel 15 Gram(s) Oral once  enalapril 20 milliGRAM(s) Oral daily  enoxaparin Injectable 40 milliGRAM(s) SubCutaneous daily  gabapentin 100 milliGRAM(s) Oral three times a day  glucagon  Injectable 1 milliGRAM(s) IntraMuscular once  hydrALAZINE 25 milliGRAM(s) Oral every 8 hours  influenza   Vaccine 0.5 milliLiter(s) IntraMuscular once  insulin lispro (ADMELOG) corrective regimen sliding scale   SubCutaneous every 6 hours  lactated ringers. 1000 milliLiter(s) (100 mL/Hr) IV Continuous <Continuous>  pantoprazole  Injectable 40 milliGRAM(s) IV Push daily  polyethylene glycol 3350 17 Gram(s) Oral daily  senna 2 Tablet(s) Oral at bedtime    MEDICATIONS  (PRN):  acetaminophen   Tablet .. 650 milliGRAM(s) Oral every 6 hours PRN Moderate Pain (4 - 6)  ondansetron Injectable 4 milliGRAM(s) IV Push every 4 hours PRN Nausea and/or Vomiting  oxyCODONE    IR 5 milliGRAM(s) Oral every 4 hours PRN Severe Pain (7 - 10)      REVIEW OF SYSTEMS:  CONSTITUTIONAL: No fever, weight loss, or fatigue  RESPIRATORY: No cough, wheezing, chills or hemoptysis; No shortness of breath  CARDIOVASCULAR: No chest pain, palpitations, dizziness, or leg swelling  GASTROINTESTINAL: No abdominal pain. No nausea, vomiting, or hematemesis; No diarrhea or constipation. No melena or hematochezia.  GENITOURINARY: No dysuria or hematuria, urinary frequency  NEUROLOGICAL: No headaches, memory loss, loss of strength, numbness, or tremors  SKIN: No itching, burning, rashes, or lesions     Vital Signs Last 24 Hrs  T(C): 36.7 (02 Dec 2020 05:35), Max: 37.2 (01 Dec 2020 13:33)  T(F): 98.1 (02 Dec 2020 05:35), Max: 98.9 (01 Dec 2020 13:33)  HR: 67 (02 Dec 2020 05:35) (67 - 78)  BP: 155/64 (02 Dec 2020 05:35) (151/56 - 155/64)  BP(mean): --  RR: 17 (02 Dec 2020 05:35) (17 - 17)  SpO2: 99% (02 Dec 2020 05:35) (96% - 99%)    PHYSICAL EXAMINATION:  GENERAL: NAD, well built  HEAD:  Atraumatic, Normocephalic  EYES:  conjunctiva and sclera clear  NECK: Supple, No JVD, Normal thyroid  CHEST/LUNG: Clear to auscultation. Clear to percussion bilaterally; No rales, rhonchi, wheezing, or rubs  HEART: Regular rate and rhythm; No murmurs, rubs, or gallops  ABDOMEN: Soft, Nontender, Nondistended; Bowel sounds present  NERVOUS SYSTEM:  Alert & Oriented X3,    EXTREMITIES:  2+ Peripheral Pulses, No clubbing, cyanosis, or edema  SKIN: warm dry                          13.7   10.17 )-----------( 310      ( 02 Dec 2020 06:30 )             43.8     12-02    137  |  99  |  8   ----------------------------<  169<H>  4.2   |  27  |  0.53    Ca    9.1      02 Dec 2020 06:30  Phos  3.5     12-02  Mg     2.0     12-02    TPro  6.0  /  Alb  2.0<L>  /  TBili  0.9  /  DBili  0.3<H>  /  AST  16  /  ALT  15  /  AlkPhos  68  12-01    LIVER FUNCTIONS - ( 01 Dec 2020 07:46 )  Alb: 2.0 g/dL / Pro: 6.0 g/dL / ALK PHOS: 68 U/L / ALT: 15 U/L DA / AST: 16 U/L / GGT: x               PT/INR - ( 02 Dec 2020 06:30 )   PT: 13.1 sec;   INR: 1.11 ratio         PTT - ( 02 Dec 2020 06:30 )  PTT:28.1 sec    CAPILLARY BLOOD GLUCOSE      RADIOLOGY & ADDITIONAL TESTS:                   PGY-1 Progress Note discussed with attending    PAGER #: [[phone]] TILL 5:00 PM  PLEASE CONTACT ON CALL TEAM:  - On Call Team (Please refer to Melodie) FROM 5:00 PM - 8:30PM  - Nightfloat Team FROM 8:30 -7:30 AM    CHIEF COMPLAINT & BRIEF HOSPITAL COURSE: Patient is 59M, Guinean speaking, lives at home with his wife, walks independently with a PMHx of HTN, HLD, DM, NIKKI, h/o Left Hydrocelectomy on 7/21/2020 who presented with a chief complaint of severe abdominal pain x 1 day. Patient states that it began around 20:00 on 11/24, after eating a dinner of steak and tomatoes. He endorsed severe 9/10 pain, constant, located mostly in the epigastric area radiating to flanks, and associated with nausea, and vomiting. He denies prior episodes of these symptoms, any recent alcohol intake, drug or smoking. He was found to have Ct findings consistent with acute interstitial pancreatitis, BISAP 2, [place] 3. RUQ US was equivocal for gallstones, and HIDA scan negative for cholecystitis/lithiasis. Currently on aggressive IV hydration, and pain control; s/p Zosyn for worsening wbc count, d/c'ed 11/28. Blood cultures negative. Advanced diet as tolerated (now pt is tolerating soft diet) with abdominal pain improving. As USG shows GB sludge, surgery consulted for lap brant this admission. Pt is medically cleared for undergoing Brant procedure today 12/2    INTERVAL HPI/OVERNIGHT EVENTS: Communicated with pt using  services #[id number], pt has mild abdominal discomfort, however overall, symptomatically feeling better from the time of admission. Planned for Lap brant later today    MEDICATIONS  (STANDING):  atorvastatin 20 milliGRAM(s) Oral at bedtime  chlorhexidine 2% Cloths 1 Application(s) Topical daily  dextrose 40% Gel 15 Gram(s) Oral once  enalapril 20 milliGRAM(s) Oral daily  enoxaparin Injectable 40 milliGRAM(s) SubCutaneous daily  gabapentin 100 milliGRAM(s) Oral three times a day  glucagon  Injectable 1 milliGRAM(s) IntraMuscular once  hydrALAZINE 25 milliGRAM(s) Oral every 8 hours  influenza   Vaccine 0.5 milliLiter(s) IntraMuscular once  insulin lispro (ADMELOG) corrective regimen sliding scale   SubCutaneous every 6 hours  lactated ringers. 1000 milliLiter(s) (100 mL/Hr) IV Continuous <Continuous>  pantoprazole  Injectable 40 milliGRAM(s) IV Push daily  polyethylene glycol 3350 17 Gram(s) Oral daily  senna 2 Tablet(s) Oral at bedtime    MEDICATIONS  (PRN):  acetaminophen   Tablet .. 650 milliGRAM(s) Oral every 6 hours PRN Moderate Pain (4 - 6)  ondansetron Injectable 4 milliGRAM(s) IV Push every 4 hours PRN Nausea and/or Vomiting  oxyCODONE    IR 5 milliGRAM(s) Oral every 4 hours PRN Severe Pain (7 - 10)    REVIEW OF SYSTEMS:  CONSTITUTIONAL: No fever, weight loss, or fatigue  RESPIRATORY: No cough, wheezing, chills or hemoptysis; No shortness of breath  CARDIOVASCULAR: No chest pain, palpitations, dizziness, or leg swelling  GASTROINTESTINAL: + abdominal discomfort. No nausea, vomiting, or hematemesis; No diarrhea or constipation. No melena or hematochezia.  GENITOURINARY: No dysuria or hematuria, urinary frequency  NEUROLOGICAL: No headaches, memory loss, loss of strength, numbness, or tremors  SKIN: No itching, burning, rashes, or lesions       Vital Signs Last 24 Hrs  T(C): 36.7 (02 Dec 2020 05:35), Max: 37.2 (01 Dec 2020 13:33)  T(F): 98.1 (02 Dec 2020 05:35), Max: 98.9 (01 Dec 2020 13:33)  HR: 67 (02 Dec 2020 05:35) (67 - 78)  BP: 155/64 (02 Dec 2020 05:35) (151/56 - 155/64)  BP(mean): --  RR: 17 (02 Dec 2020 05:35) (17 - 17)  SpO2: 99% (02 Dec 2020 05:35) (96% - 99%)    PHYSICAL EXAMINATION:  GENERAL: Pt lying in bed, not in acute distress  HEAD:  Atraumatic, Normocephalic  EYES:  conjunctiva and sclera clear  NECK: Supple, No JVD, Normal thyroid  CHEST/LUNG: Clear to auscultation.  HEART: Regular rate and rhythm; No murmurs, rubs, or gallops  ABDOMEN: Soft, mild epigastric tenderness, bowel sounds heard  NERVOUS SYSTEM:  Alert & Oriented X3,    EXTREMITIES:  2+ Peripheral Pulses, No clubbing, cyanosis, or edema  SKIN: warm dry                          13.7   10.17 )-----------( 310      ( 02 Dec 2020 06:30 )             43.8     12-02    137  |  99  |  8   ----------------------------<  169<H>  4.2   |  27  |  0.53    Ca    9.1      02 Dec 2020 06:30  Phos  3.5     12-02  Mg     2.0     12-02    TPro  6.0  /  Alb  2.0<L>  /  TBili  0.9  /  DBili  0.3<H>  /  AST  16  /  ALT  15  /  AlkPhos  68  12-01    LIVER FUNCTIONS - ( 01 Dec 2020 07:46 )  Alb: 2.0 g/dL / Pro: 6.0 g/dL / ALK PHOS: 68 U/L / ALT: 15 U/L DA / AST: 16 U/L / GGT: x               PT/INR - ( 02 Dec 2020 06:30 )   PT: 13.1 sec;   INR: 1.11 ratio         PTT - ( 02 Dec 2020 06:30 )  PTT:28.1 sec    CAPILLARY BLOOD GLUCOSE      RADIOLOGY & ADDITIONAL TESTS:

## 2020-12-02 NOTE — PROGRESS NOTE ADULT - PROBLEM SELECTOR PLAN 4
- Pt has wbc donwtrending 10k this AM, no fevers, abdominal pain improving  - Blood cultures negative   - Zosyn 3.375gm D/c'ed  - HIDA  negative for cholecystitis   - Monitor vitals for now

## 2020-12-02 NOTE — PROGRESS NOTE ADULT - ASSESSMENT
Patient is 59M, Canadian speaking, lives at home with his wife, walks independently with a PMHx of HTN, HLD, DM, NIKKI, h/o Left Hydrocelectomy on 7/21/2020 who presented with a chief complaint of severe abdominal pain x 1 day; found to have elevated lipase and acute interstitial pancreatitis on CT, due to drug induced from Victoza vs. passed gall bladder stone (US showing GB sludge), planned for Lap brant today 12/2.

## 2020-12-02 NOTE — BRIEF OPERATIVE NOTE - NSICDXBRIEFPROCEDURE_GEN_ALL_CORE_FT
PROCEDURES:  Cholecystectomy, laparoscopic, with cholangiogram 02-Dec-2020 17:19:02  Krysta Goldberg

## 2020-12-02 NOTE — PROGRESS NOTE ADULT - SUBJECTIVE AND OBJECTIVE BOX
Patient examined and chart reviewed.  Condition discussed with patient, options risks and benefits explained.  Questions answered.  Laparoscopic cholecystectomy, possible open today.  Consent signed.

## 2020-12-02 NOTE — PROGRESS NOTE ADULT - PROBLEM SELECTOR PLAN 3
- Pt taking farxiga, metformin-glyburide BD, victoza at home  - farxiga has case reports of DIP, Victoza is also associated with pancreatitis  - Hemoglobin A1c 9.2  - Monitor blood sugars, on Sliding scale for now  - Endo consult Dr. Serrano   - To start basal insulin along with oral meds on discharge with f/u   - Diet transitioned to soft, pt tolerating well (NPO for brant surgery today)  - Endocrinology f/u outpatient

## 2020-12-03 ENCOUNTER — TRANSCRIPTION ENCOUNTER (OUTPATIENT)
Age: 60
End: 2020-12-03

## 2020-12-03 VITALS
HEART RATE: 76 BPM | DIASTOLIC BLOOD PRESSURE: 71 MMHG | OXYGEN SATURATION: 98 % | SYSTOLIC BLOOD PRESSURE: 151 MMHG | RESPIRATION RATE: 16 BRPM | TEMPERATURE: 99 F

## 2020-12-03 LAB
ANION GAP SERPL CALC-SCNC: 11 MMOL/L — SIGNIFICANT CHANGE UP (ref 5–17)
BASOPHILS # BLD AUTO: 0.03 K/UL — SIGNIFICANT CHANGE UP (ref 0–0.2)
BASOPHILS NFR BLD AUTO: 0.3 % — SIGNIFICANT CHANGE UP (ref 0–2)
BUN SERPL-MCNC: 8 MG/DL — SIGNIFICANT CHANGE UP (ref 7–18)
CALCIUM SERPL-MCNC: 9 MG/DL — SIGNIFICANT CHANGE UP (ref 8.4–10.5)
CHLORIDE SERPL-SCNC: 98 MMOL/L — SIGNIFICANT CHANGE UP (ref 96–108)
CO2 SERPL-SCNC: 27 MMOL/L — SIGNIFICANT CHANGE UP (ref 22–31)
CREAT SERPL-MCNC: 0.52 MG/DL — SIGNIFICANT CHANGE UP (ref 0.5–1.3)
EOSINOPHIL # BLD AUTO: 0.1 K/UL — SIGNIFICANT CHANGE UP (ref 0–0.5)
EOSINOPHIL NFR BLD AUTO: 1 % — SIGNIFICANT CHANGE UP (ref 0–6)
GLUCOSE BLDC GLUCOMTR-MCNC: 156 MG/DL — HIGH (ref 70–99)
GLUCOSE BLDC GLUCOMTR-MCNC: 230 MG/DL — HIGH (ref 70–99)
GLUCOSE BLDC GLUCOMTR-MCNC: 257 MG/DL — HIGH (ref 70–99)
GLUCOSE SERPL-MCNC: 164 MG/DL — HIGH (ref 70–99)
HCT VFR BLD CALC: 42.1 % — SIGNIFICANT CHANGE UP (ref 39–50)
HGB BLD-MCNC: 13.1 G/DL — SIGNIFICANT CHANGE UP (ref 13–17)
IGG SERPL-MCNC: 885 MG/DL — SIGNIFICANT CHANGE UP (ref 603–1613)
IGG1 SER-MCNC: 398 MG/DL — SIGNIFICANT CHANGE UP (ref 248–810)
IGG2 SER-MCNC: 326 MG/DL — SIGNIFICANT CHANGE UP (ref 130–555)
IGG3 SER-MCNC: 40 MG/DL — SIGNIFICANT CHANGE UP (ref 15–102)
IGG4 SER-MCNC: 10 MG/DL — SIGNIFICANT CHANGE UP (ref 2–96)
IMM GRANULOCYTES NFR BLD AUTO: 1.1 % — SIGNIFICANT CHANGE UP (ref 0–1.5)
LYMPHOCYTES # BLD AUTO: 1.33 K/UL — SIGNIFICANT CHANGE UP (ref 1–3.3)
LYMPHOCYTES # BLD AUTO: 13.9 % — SIGNIFICANT CHANGE UP (ref 13–44)
MCHC RBC-ENTMCNC: 27.9 PG — SIGNIFICANT CHANGE UP (ref 27–34)
MCHC RBC-ENTMCNC: 31.1 GM/DL — LOW (ref 32–36)
MCV RBC AUTO: 89.8 FL — SIGNIFICANT CHANGE UP (ref 80–100)
MONOCYTES # BLD AUTO: 1.19 K/UL — HIGH (ref 0–0.9)
MONOCYTES NFR BLD AUTO: 12.4 % — SIGNIFICANT CHANGE UP (ref 2–14)
NEUTROPHILS # BLD AUTO: 6.84 K/UL — SIGNIFICANT CHANGE UP (ref 1.8–7.4)
NEUTROPHILS NFR BLD AUTO: 71.3 % — SIGNIFICANT CHANGE UP (ref 43–77)
NRBC # BLD: 0 /100 WBCS — SIGNIFICANT CHANGE UP (ref 0–0)
PLATELET # BLD AUTO: 312 K/UL — SIGNIFICANT CHANGE UP (ref 150–400)
POTASSIUM SERPL-MCNC: 3.9 MMOL/L — SIGNIFICANT CHANGE UP (ref 3.5–5.3)
POTASSIUM SERPL-SCNC: 3.9 MMOL/L — SIGNIFICANT CHANGE UP (ref 3.5–5.3)
RBC # BLD: 4.69 M/UL — SIGNIFICANT CHANGE UP (ref 4.2–5.8)
RBC # FLD: 14.4 % — SIGNIFICANT CHANGE UP (ref 10.3–14.5)
SODIUM SERPL-SCNC: 136 MMOL/L — SIGNIFICANT CHANGE UP (ref 135–145)
WBC # BLD: 9.6 K/UL — SIGNIFICANT CHANGE UP (ref 3.8–10.5)
WBC # FLD AUTO: 9.6 K/UL — SIGNIFICANT CHANGE UP (ref 3.8–10.5)

## 2020-12-03 PROCEDURE — 85730 THROMBOPLASTIN TIME PARTIAL: CPT

## 2020-12-03 PROCEDURE — 82746 ASSAY OF FOLIC ACID SERUM: CPT

## 2020-12-03 PROCEDURE — 81003 URINALYSIS AUTO W/O SCOPE: CPT

## 2020-12-03 PROCEDURE — 85025 COMPLETE CBC W/AUTO DIFF WBC: CPT

## 2020-12-03 PROCEDURE — 83036 HEMOGLOBIN GLYCOSYLATED A1C: CPT

## 2020-12-03 PROCEDURE — 86900 BLOOD TYPING SEROLOGIC ABO: CPT

## 2020-12-03 PROCEDURE — 82607 VITAMIN B-12: CPT

## 2020-12-03 PROCEDURE — 82977 ASSAY OF GGT: CPT

## 2020-12-03 PROCEDURE — 84145 PROCALCITONIN (PCT): CPT

## 2020-12-03 PROCEDURE — 84478 ASSAY OF TRIGLYCERIDES: CPT

## 2020-12-03 PROCEDURE — 86850 RBC ANTIBODY SCREEN: CPT

## 2020-12-03 PROCEDURE — 80053 COMPREHEN METABOLIC PANEL: CPT

## 2020-12-03 PROCEDURE — 83615 LACTATE (LD) (LDH) ENZYME: CPT

## 2020-12-03 PROCEDURE — 93005 ELECTROCARDIOGRAM TRACING: CPT

## 2020-12-03 PROCEDURE — 71045 X-RAY EXAM CHEST 1 VIEW: CPT

## 2020-12-03 PROCEDURE — 84100 ASSAY OF PHOSPHORUS: CPT

## 2020-12-03 PROCEDURE — 76000 FLUOROSCOPY <1 HR PHYS/QHP: CPT

## 2020-12-03 PROCEDURE — 82150 ASSAY OF AMYLASE: CPT

## 2020-12-03 PROCEDURE — 93306 TTE W/DOPPLER COMPLETE: CPT

## 2020-12-03 PROCEDURE — 78226 HEPATOBILIARY SYSTEM IMAGING: CPT

## 2020-12-03 PROCEDURE — 86803 HEPATITIS C AB TEST: CPT

## 2020-12-03 PROCEDURE — 74177 CT ABD & PELVIS W/CONTRAST: CPT

## 2020-12-03 PROCEDURE — 85652 RBC SED RATE AUTOMATED: CPT

## 2020-12-03 PROCEDURE — 83690 ASSAY OF LIPASE: CPT

## 2020-12-03 PROCEDURE — 80076 HEPATIC FUNCTION PANEL: CPT

## 2020-12-03 PROCEDURE — 80048 BASIC METABOLIC PNL TOTAL CA: CPT

## 2020-12-03 PROCEDURE — 82140 ASSAY OF AMMONIA: CPT

## 2020-12-03 PROCEDURE — 80307 DRUG TEST PRSMV CHEM ANLYZR: CPT

## 2020-12-03 PROCEDURE — 82330 ASSAY OF CALCIUM: CPT

## 2020-12-03 PROCEDURE — 86769 SARS-COV-2 COVID-19 ANTIBODY: CPT

## 2020-12-03 PROCEDURE — 87040 BLOOD CULTURE FOR BACTERIA: CPT

## 2020-12-03 PROCEDURE — 84484 ASSAY OF TROPONIN QUANT: CPT

## 2020-12-03 PROCEDURE — 88304 TISSUE EXAM BY PATHOLOGIST: CPT

## 2020-12-03 PROCEDURE — 99232 SBSQ HOSP IP/OBS MODERATE 35: CPT

## 2020-12-03 PROCEDURE — 83735 ASSAY OF MAGNESIUM: CPT

## 2020-12-03 PROCEDURE — 84443 ASSAY THYROID STIM HORMONE: CPT

## 2020-12-03 PROCEDURE — 99285 EMERGENCY DEPT VISIT HI MDM: CPT | Mod: 25

## 2020-12-03 PROCEDURE — 82787 IGG 1 2 3 OR 4 EACH: CPT

## 2020-12-03 PROCEDURE — 36415 COLL VENOUS BLD VENIPUNCTURE: CPT

## 2020-12-03 PROCEDURE — 85610 PROTHROMBIN TIME: CPT

## 2020-12-03 PROCEDURE — 82728 ASSAY OF FERRITIN: CPT

## 2020-12-03 PROCEDURE — 86140 C-REACTIVE PROTEIN: CPT

## 2020-12-03 PROCEDURE — 76705 ECHO EXAM OF ABDOMEN: CPT

## 2020-12-03 PROCEDURE — 87635 SARS-COV-2 COVID-19 AMP PRB: CPT

## 2020-12-03 PROCEDURE — 80061 LIPID PANEL: CPT

## 2020-12-03 PROCEDURE — 87086 URINE CULTURE/COLONY COUNT: CPT

## 2020-12-03 PROCEDURE — 83605 ASSAY OF LACTIC ACID: CPT

## 2020-12-03 PROCEDURE — 82962 GLUCOSE BLOOD TEST: CPT

## 2020-12-03 PROCEDURE — 82306 VITAMIN D 25 HYDROXY: CPT

## 2020-12-03 PROCEDURE — 84550 ASSAY OF BLOOD/URIC ACID: CPT

## 2020-12-03 PROCEDURE — 85027 COMPLETE CBC AUTOMATED: CPT

## 2020-12-03 PROCEDURE — A9537: CPT

## 2020-12-03 PROCEDURE — 99053 MED SERV 10PM-8AM 24 HR FAC: CPT

## 2020-12-03 PROCEDURE — 86901 BLOOD TYPING SEROLOGIC RH(D): CPT

## 2020-12-03 RX ORDER — DAPAGLIFLOZIN 10 MG/1
1 TABLET, FILM COATED ORAL
Qty: 0 | Refills: 0 | DISCHARGE

## 2020-12-03 RX ORDER — FENOFIBRATE,MICRONIZED 130 MG
0 CAPSULE ORAL
Qty: 0 | Refills: 1 | DISCHARGE

## 2020-12-03 RX ORDER — ENOXAPARIN SODIUM 100 MG/ML
15 INJECTION SUBCUTANEOUS
Qty: 1 | Refills: 0
Start: 2020-12-03 | End: 2020-12-16

## 2020-12-03 RX ORDER — GLYBURIDE-METFORMIN HYDROCHLORIDE 1.25; 25 MG/1; MG/1
0 TABLET ORAL
Qty: 0 | Refills: 2 | DISCHARGE

## 2020-12-03 RX ORDER — LIRAGLUTIDE 6 MG/ML
0 INJECTION SUBCUTANEOUS
Qty: 0 | Refills: 1 | DISCHARGE

## 2020-12-03 RX ORDER — ACETAMINOPHEN 500 MG
1 TABLET ORAL
Qty: 8 | Refills: 0
Start: 2020-12-03 | End: 2020-12-04

## 2020-12-03 RX ORDER — ISOPROPYL ALCOHOL, BENZOCAINE .7; .06 ML/ML; ML/ML
1 SWAB TOPICAL
Qty: 100 | Refills: 1
Start: 2020-12-03 | End: 2021-01-21

## 2020-12-03 RX ADMIN — ENOXAPARIN SODIUM 40 MILLIGRAM(S): 100 INJECTION SUBCUTANEOUS at 12:22

## 2020-12-03 RX ADMIN — Medication 25 MILLIGRAM(S): at 06:16

## 2020-12-03 RX ADMIN — GABAPENTIN 100 MILLIGRAM(S): 400 CAPSULE ORAL at 13:28

## 2020-12-03 RX ADMIN — POLYETHYLENE GLYCOL 3350 17 GRAM(S): 17 POWDER, FOR SOLUTION ORAL at 12:22

## 2020-12-03 RX ADMIN — Medication 1: at 00:04

## 2020-12-03 RX ADMIN — Medication 20 MILLIGRAM(S): at 06:16

## 2020-12-03 RX ADMIN — Medication 2: at 17:14

## 2020-12-03 RX ADMIN — Medication 3: at 12:39

## 2020-12-03 RX ADMIN — GABAPENTIN 100 MILLIGRAM(S): 400 CAPSULE ORAL at 06:18

## 2020-12-03 RX ADMIN — Medication 25 MILLIGRAM(S): at 13:28

## 2020-12-03 RX ADMIN — Medication 1: at 06:15

## 2020-12-03 RX ADMIN — PANTOPRAZOLE SODIUM 40 MILLIGRAM(S): 20 TABLET, DELAYED RELEASE ORAL at 12:22

## 2020-12-03 NOTE — PROGRESS NOTE ADULT - ASSESSMENT
Assessment  60 y/o male, S/P laparoscopic cholecystectomy POD# 1, tolerating diet       Plan  -encourage ambulation  -acetaminophen, ondansetron PRN  -advance diet, D/C later today

## 2020-12-03 NOTE — PROGRESS NOTE ADULT - PROBLEM SELECTOR PLAN 4
A1C:  9.2   Monitor FS ACHS. Will continue with sliding scale.   Victoza (home medication) d/c due to pancreatitis.   Will d/c with Lantus at bedtime, basal insulin, and oral diabetic medications.   Educate patient on insulin administration prior to d/c. Patient to be d/c on insulin.   Endocrine following: Dr. Serrano.

## 2020-12-03 NOTE — PROGRESS NOTE ADULT - PROBLEM SELECTOR PROBLEM 5
Hyperlipidemia
Renal cyst
Hyperlipidemia

## 2020-12-03 NOTE — PROGRESS NOTE ADULT - SUBJECTIVE AND OBJECTIVE BOX
Subjective  60 y/o male s/p laparoscopic cholecystectomy POD 1.  Pt reports pain only at incision sites and denies nausea, vomiting. Pt reports voiding, denies passing flatus and denies any bowel movements since surgery. Pt denies ambulating. Pt is tolerating clear liquid diet.    PAST MEDICAL & SURGICAL HISTORY:  Language barrier    Former smoker, stopped smoking in distant past    NIKKI (obstructive sleep apnea)    Gastritis    Diabetes mellitus    Hyperlipidemia    Hypertension    No significant past surgical history      Objective  MEDICATIONS  (STANDING):  atorvastatin 20 milliGRAM(s) Oral at bedtime  dextrose 40% Gel 15 Gram(s) Oral once  dextrose 40% Gel 15 Gram(s) Oral once  dextrose 5%. 1000 milliLiter(s) (50 mL/Hr) IV Continuous <Continuous>  dextrose 5%. 1000 milliLiter(s) (100 mL/Hr) IV Continuous <Continuous>  dextrose 50% Injectable 25 Gram(s) IV Push once  dextrose 50% Injectable 12.5 Gram(s) IV Push once  dextrose 50% Injectable 25 Gram(s) IV Push once  enalapril 20 milliGRAM(s) Oral daily  enoxaparin Injectable 40 milliGRAM(s) SubCutaneous daily  gabapentin 100 milliGRAM(s) Oral three times a day  glucagon  Injectable 1 milliGRAM(s) IntraMuscular once  glucagon  Injectable 1 milliGRAM(s) IntraMuscular once  hydrALAZINE 25 milliGRAM(s) Oral every 8 hours  influenza   Vaccine 0.5 milliLiter(s) IntraMuscular once  insulin lispro (ADMELOG) corrective regimen sliding scale   SubCutaneous every 6 hours  pantoprazole  Injectable 40 milliGRAM(s) IV Push daily  polyethylene glycol 3350 17 Gram(s) Oral daily  senna 2 Tablet(s) Oral at bedtime  sodium chloride 0.45%. 1000 milliLiter(s) (100 mL/Hr) IV Continuous <Continuous>    MEDICATIONS  (PRN):  acetaminophen   Tablet .. 650 milliGRAM(s) Oral every 6 hours PRN Temp greater or equal to 38C (100.4F), Mild Pain (1 - 3)  morphine  - Injectable 4 milliGRAM(s) IV Push every 4 hours PRN Severe Pain (7 - 10)  ondansetron Injectable 4 milliGRAM(s) IV Push every 4 hours PRN Nausea and/or Vomiting  oxycodone    5 mG/acetaminophen 325 mG 1 Tablet(s) Oral every 4 hours PRN Moderate Pain (4 - 6)    Vital Signs Last 24 Hrs  T(C): 36.8 (03 Dec 2020 06:05), Max: 36.8 (03 Dec 2020 06:05)  T(F): 98.3 (03 Dec 2020 06:05), Max: 98.3 (03 Dec 2020 06:05)  HR: 66 (03 Dec 2020 06:05) (65 - 81)  BP: 137/62 (03 Dec 2020 06:05) (137/62 - 158/68)  BP(mean): 84 (02 Dec 2020 19:40) (84 - 93)  RR: 18 (03 Dec 2020 06:05) (12 - 20)  SpO2: 99% (03 Dec 2020 06:05) (94% - 99%)    I&O's Detail  02 Dec 2020 07:01  -  03 Dec 2020 07:00  --------------------------------------------------------  IN:    Lactated Ringers Bolus: 700 mL  Total IN: 700 mL  OUT:  Total OUT: 0 mL  Total NET: 700 mL    CAPILLARY BLOOD GLUCOSE  POCT Blood Glucose.: 156 mg/dL (03 Dec 2020 05:46)  POCT Blood Glucose.: 181 mg/dL (02 Dec 2020 21:01)  POCT Blood Glucose.: 162 mg/dL (02 Dec 2020 17:40)  POCT Blood Glucose.: 154 mg/dL (02 Dec 2020 11:32)    Physical Exam  Pt A&Ox3 and appears well. Bowel sounds are normal, abdomen is soft, no tenderness or guarding upon palpation.      Assessment  60 y/o male, S/P laparoscopic cholecystectomy POD 1. Pt reports pain only at incision sites and denies nausea or vomiting. Pt is tolerating clear liquid diet.      Plan  -encourage ambulation  -acetaminophen, ondansetron PRN  -advance diet, D/C later today Subjective  58 y/o male s/p laparoscopic cholecystectomy POD 1.  Pt reports pain only at incision sites and denies nausea, vomiting. Pt reports voiding, denies passing flatus and denies any bowel movements since surgery. Pt denies ambulating. Pt is tolerating clear liquid diet.    PAST MEDICAL & SURGICAL HISTORY:  Language barrier    Former smoker, stopped smoking in distant past    NIKKI (obstructive sleep apnea)    Gastritis    Diabetes mellitus    Hyperlipidemia    Hypertension    No significant past surgical history      Objective  MEDICATIONS  (STANDING):  atorvastatin 20 milliGRAM(s) Oral at bedtime  dextrose 40% Gel 15 Gram(s) Oral once  dextrose 40% Gel 15 Gram(s) Oral once  dextrose 5%. 1000 milliLiter(s) (50 mL/Hr) IV Continuous <Continuous>  dextrose 5%. 1000 milliLiter(s) (100 mL/Hr) IV Continuous <Continuous>  dextrose 50% Injectable 25 Gram(s) IV Push once  dextrose 50% Injectable 12.5 Gram(s) IV Push once  dextrose 50% Injectable 25 Gram(s) IV Push once  enalapril 20 milliGRAM(s) Oral daily  enoxaparin Injectable 40 milliGRAM(s) SubCutaneous daily  gabapentin 100 milliGRAM(s) Oral three times a day  glucagon  Injectable 1 milliGRAM(s) IntraMuscular once  glucagon  Injectable 1 milliGRAM(s) IntraMuscular once  hydrALAZINE 25 milliGRAM(s) Oral every 8 hours  influenza   Vaccine 0.5 milliLiter(s) IntraMuscular once  insulin lispro (ADMELOG) corrective regimen sliding scale   SubCutaneous every 6 hours  pantoprazole  Injectable 40 milliGRAM(s) IV Push daily  polyethylene glycol 3350 17 Gram(s) Oral daily  senna 2 Tablet(s) Oral at bedtime  sodium chloride 0.45%. 1000 milliLiter(s) (100 mL/Hr) IV Continuous <Continuous>    MEDICATIONS  (PRN):  acetaminophen   Tablet .. 650 milliGRAM(s) Oral every 6 hours PRN Temp greater or equal to 38C (100.4F), Mild Pain (1 - 3)  morphine  - Injectable 4 milliGRAM(s) IV Push every 4 hours PRN Severe Pain (7 - 10)  ondansetron Injectable 4 milliGRAM(s) IV Push every 4 hours PRN Nausea and/or Vomiting  oxycodone    5 mG/acetaminophen 325 mG 1 Tablet(s) Oral every 4 hours PRN Moderate Pain (4 - 6)    Vital Signs Last 24 Hrs  T(C): 36.8 (03 Dec 2020 06:05), Max: 36.8 (03 Dec 2020 06:05)  T(F): 98.3 (03 Dec 2020 06:05), Max: 98.3 (03 Dec 2020 06:05)  HR: 66 (03 Dec 2020 06:05) (65 - 81)  BP: 137/62 (03 Dec 2020 06:05) (137/62 - 158/68)  BP(mean): 84 (02 Dec 2020 19:40) (84 - 93)  RR: 18 (03 Dec 2020 06:05) (12 - 20)  SpO2: 99% (03 Dec 2020 06:05) (94% - 99%)    I&O's Detail  02 Dec 2020 07:01  -  03 Dec 2020 07:00  --------------------------------------------------------  IN:    Lactated Ringers Bolus: 700 mL  Total IN: 700 mL  OUT:  Total OUT: 0 mL  Total NET: 700 mL    CAPILLARY BLOOD GLUCOSE  POCT Blood Glucose.: 156 mg/dL (03 Dec 2020 05:46)  POCT Blood Glucose.: 181 mg/dL (02 Dec 2020 21:01)  POCT Blood Glucose.: 162 mg/dL (02 Dec 2020 17:40)  POCT Blood Glucose.: 154 mg/dL (02 Dec 2020 11:32)    Physical Exam  General: A&Ox3 and appears well  Abdomen: Bowel sounds present in all 4 quadrants, soft, non distended, non  tenderness, no  guarding upon palpation, dressing clean dry intact

## 2020-12-03 NOTE — DISCHARGE NOTE NURSING/CASE MANAGEMENT/SOCIAL WORK - PATIENT PORTAL LINK FT
You can access the FollowMyHealth Patient Portal offered by [facility] by registering at the following website: [email or web address]. By joining [employer]s FollowMyHealth portal, you will also be able to view your health information using other applications (apps) compatible with our system.

## 2020-12-03 NOTE — PROGRESS NOTE ADULT - PROBLEM SELECTOR PROBLEM 1
Pancreatitis, acute
Left upper quadrant abdominal pain
Pancreatitis, acute

## 2020-12-03 NOTE — PROGRESS NOTE ADULT - PROBLEM SELECTOR PLAN 1
Presented with severe abdominal pain for one day.   POD 1 s/p lap brant.   Lap sites are clean, dry, and intact.   Pain medication with acetaminophen (mild pain) or percocet (moderate pain) as needed.   Patient tolerating solids and fluids without nausea and vomiting.  Lipase level down trending from 27009 (11/25) to 240 (12/1).   Amylase downtrending from 529 (11/25) to 31 (12/1).   CT Abdomen/Pelvis with IV contrast- acute interstitial pancreatitis. Mild GB wall edema.  RUQ US shows biliary sludge, no evidence of GS, biliary dilatation   HIDA Scan done11/27, with 4mg morphine, negative for cholecystitis  BISAP Score= 2 (BUN>25, Age>60), [place] score: 3  Monitor and will replete electrolytes as needed.  GI following: Dr. Hill. Surgery following: Dr. Brown.  D/c Planning today.

## 2020-12-03 NOTE — PROGRESS NOTE ADULT - SUBJECTIVE AND OBJECTIVE BOX
Interval Events:  pt feels well   s/p brant    Allergies    No Known Allergies    Intolerances      Endocrine/Metabolic Medications:  atorvastatin 20 milliGRAM(s) Oral at bedtime  dextrose 40% Gel 15 Gram(s) Oral once  dextrose 40% Gel 15 Gram(s) Oral once  dextrose 50% Injectable 25 Gram(s) IV Push once  dextrose 50% Injectable 12.5 Gram(s) IV Push once  dextrose 50% Injectable 25 Gram(s) IV Push once  glucagon  Injectable 1 milliGRAM(s) IntraMuscular once  glucagon  Injectable 1 milliGRAM(s) IntraMuscular once  insulin lispro (ADMELOG) corrective regimen sliding scale   SubCutaneous every 6 hours      Vital Signs Last 24 Hrs  T(C): 36.8 (03 Dec 2020 06:05), Max: 36.8 (03 Dec 2020 06:05)  T(F): 98.3 (03 Dec 2020 06:05), Max: 98.3 (03 Dec 2020 06:05)  HR: 66 (03 Dec 2020 06:05) (65 - 81)  BP: 137/62 (03 Dec 2020 06:05) (137/62 - 158/68)  BP(mean): 84 (02 Dec 2020 19:40) (84 - 93)  RR: 18 (03 Dec 2020 06:05) (12 - 20)  SpO2: 99% (03 Dec 2020 06:05) (94% - 99%)      PHYSICAL EXAM  All physical exam findings normal, except those marked:  General:	Alert, active, cooperative, NAD, well hydrated  .		[] Abnormal:  Neck		Normal: supple, no cervical adenopathy, no palpable thyroid  .		[] Abnormal:  Cardiovascular	Normal: regular rate, normal S1, S2, no murmurs  .		[] Abnormal:  Respiratory	Normal: no chest wall deformity, normal respiratory pattern, CTA B/L  .		[] Abnormal:  Abdominal	Normal: soft, ND, NT, bowel sounds present, no masses, no organomegaly  .		[] Abnormal:  		Normal normal genitalia, testes descended, circumcised/uncircumcised  .		Betty stage:			Breast betty:  .		Menstrual history:  .		[] Abnormal:  Extremities	Normal: FROM x4  .		[] Abnormal:  Skin		Normal: intact and not indurated, no rash, no acanthosis nigricans  .		[] Abnormal:  Neurologic	Normal: grossly intact  .		[] Abnormal:    LABS                        13.1   9.60  )-----------( 312      ( 03 Dec 2020 07:51 )             42.1                               136    |  98     |  8                   Calcium: 9.0   / iCa: x      (12-03 @ 07:51)    ----------------------------<  164       Magnesium: x                                3.9     |  27     |  0.52             Phosphorous: x          CAPILLARY BLOOD GLUCOSE      POCT Blood Glucose.: 156 mg/dL (03 Dec 2020 05:46)  POCT Blood Glucose.: 181 mg/dL (02 Dec 2020 21:01)  POCT Blood Glucose.: 162 mg/dL (02 Dec 2020 17:40)  POCT Blood Glucose.: 154 mg/dL (02 Dec 2020 11:32)        Assesment/plan      Patient is a 59 year old male Peruvian speaking from home live with wife walk independantly with a PMH of HTN, HLD, DM, NIKKI, h/o Left Hydrocelectomy on 7/21/2020  who presented with a chief complaint of abdominal pain. Found to have acute pancreatitis and elevated A1c. Pt takes victoza and oral dm meds as out pt.            Problem/Recommendation - 1:  Problem: Diabetes mellitus: poorly controlled as out pt    D/C VICTOZA out pt meds- due to pancreatitis  consider basal insulin and oral dm meds upon d/c  d/w pts wife over the phone.  fsg ac and hs     Problem/Recommendation - 2:  ·  Problem: Pancreatitis, acute: s/p cholecystectomy   tolerating diet  .

## 2020-12-03 NOTE — CHART NOTE - NSCHARTNOTEFT_GEN_A_CORE
Pt POD 0 s/p lap brant  resting comfortably  no n/v  voided    Vital Signs Last 24 Hrs  T(C): 36.5 (03 Dec 2020 00:10), Max: 36.7 (02 Dec 2020 05:35)  T(F): 97.7 (03 Dec 2020 00:10), Max: 98.1 (02 Dec 2020 05:35)  HR: 67 (03 Dec 2020 00:10) (65 - 81)  BP: 138/62 (03 Dec 2020 00:10) (138/62 - 158/68)  BP(mean): 84 (02 Dec 2020 19:40) (84 - 93)  RR: 18 (03 Dec 2020 00:10) (12 - 20)  SpO2: 96% (03 Dec 2020 00:10) (94% - 99%)    abd soft, NT, inc/dsg CDI    stable post-op

## 2020-12-03 NOTE — PROGRESS NOTE ADULT - SUBJECTIVE AND OBJECTIVE BOX
HPI:  Patient is a 59 year old male Burkinan speaking from home live with wife walk independantly with a PMH of HTN, HLD, DM, NIKKI, h/o Left Hydrocelectomy on 7/21/2020  who presented with a chief complaint of abdominal pain.  Patient states that beginning at approximately 20:00 on the evening of 11/24, after eating dinner of steak and tomatoes. patient endorsed experiencing a severe 9/10 pain, constant, non-radiating. Pt reports pain to be in epigastric and sometimes in RUQ abdominal pain that come sand goes.  Patient denies ever experiencing symptoms such as this before. Pt deneis any recent alcohol intake, drug or smokng.  (25 Nov 2020 04:59)      Patient is a 59y old  Male who presents with a chief complaint of pancreatitis (03 Dec 2020 10:22)      INTERVAL HPI/OVERNIGHT EVENTS: Patient is resting comfortably in the bed. Patient is able to tolerate solids and liquids without any issues. Patient has some incisional pain but no abdominal pain and feels better than prior to admission. He is able to ambulate without any discomfort.     T(C): 36.8 (12-03-20 @ 12:59), Max: 36.8 (12-03-20 @ 06:05)  HR: 79 (12-03-20 @ 12:59) (65 - 81)  BP: 136/65 (12-03-20 @ 12:59) (136/65 - 158/68)  RR: 16 (12-03-20 @ 12:59) (12 - 20)  SpO2: 95% (12-03-20 @ 12:59) (94% - 99%)  Wt(kg): --  I&O's Summary    02 Dec 2020 07:01  -  03 Dec 2020 07:00  --------------------------------------------------------  IN: 700 mL / OUT: 0 mL / NET: 700 mL        REVIEW OF SYSTEMS: denies fever, chills, SOB, palpitations, chest pain, abdominal pain, nausea, vomiting diarrhea, constipation, dizziness    MEDICATIONS  (STANDING):  atorvastatin 20 milliGRAM(s) Oral at bedtime  dextrose 40% Gel 15 Gram(s) Oral once  dextrose 40% Gel 15 Gram(s) Oral once  dextrose 5%. 1000 milliLiter(s) (50 mL/Hr) IV Continuous <Continuous>  dextrose 5%. 1000 milliLiter(s) (100 mL/Hr) IV Continuous <Continuous>  dextrose 50% Injectable 25 Gram(s) IV Push once  dextrose 50% Injectable 12.5 Gram(s) IV Push once  dextrose 50% Injectable 25 Gram(s) IV Push once  enalapril 20 milliGRAM(s) Oral daily  enoxaparin Injectable 40 milliGRAM(s) SubCutaneous daily  gabapentin 100 milliGRAM(s) Oral three times a day  glucagon  Injectable 1 milliGRAM(s) IntraMuscular once  glucagon  Injectable 1 milliGRAM(s) IntraMuscular once  hydrALAZINE 25 milliGRAM(s) Oral every 8 hours  influenza   Vaccine 0.5 milliLiter(s) IntraMuscular once  insulin lispro (ADMELOG) corrective regimen sliding scale   SubCutaneous every 6 hours  pantoprazole  Injectable 40 milliGRAM(s) IV Push daily  polyethylene glycol 3350 17 Gram(s) Oral daily  senna 2 Tablet(s) Oral at bedtime  sodium chloride 0.45%. 1000 milliLiter(s) (100 mL/Hr) IV Continuous <Continuous>    MEDICATIONS  (PRN):  acetaminophen   Tablet .. 650 milliGRAM(s) Oral every 6 hours PRN Temp greater or equal to 38C (100.4F), Mild Pain (1 - 3)  morphine  - Injectable 4 milliGRAM(s) IV Push every 4 hours PRN Severe Pain (7 - 10)  ondansetron Injectable 4 milliGRAM(s) IV Push every 4 hours PRN Nausea and/or Vomiting  oxycodone    5 mG/acetaminophen 325 mG 1 Tablet(s) Oral every 4 hours PRN Moderate Pain (4 - 6)      PHYSICAL EXAM:  GENERAL: NAD, well-groomed, well-developed  HEAD:  Atraumatic, Normocephalic  EYES: EOMI, PERRLA, conjunctiva and sclera clear  ENMT: No tonsillar erythema, exudates, or enlargement; Moist mucous membranes, Good dentition, No lesions  NECK: Supple, No JVD, Normal thyroid  NERVOUS SYSTEM:  Alert & Oriented X3, Good concentration; Motor Strength 5/5 B/L upper and lower extremities; DTRs 2+ intact and symmetric  CHEST/LUNG: Clear to percussion bilaterally; No rales, rhonchi, wheezing, or rubs  HEART: Regular rate and rhythm; No murmurs, rubs, or gallops  ABDOMEN: mild discomfort in the incisional site. Soft, Nontender, Nondistended; Bowel sounds present  EXTREMITIES:  2+ Peripheral Pulses, No clubbing, cyanosis, or edema  LYMPH: No lymphadenopathy noted  SKIN: No rashes or lesions  LABS:                        13.1   9.60  )-----------( 312      ( 03 Dec 2020 07:51 )             42.1     12-03    136  |  98  |  8   ----------------------------<  164<H>  3.9   |  27  |  0.52    Ca    9.0      03 Dec 2020 07:51  Phos  3.5     12-02  Mg     2.0     12-02      PT/INR - ( 02 Dec 2020 06:30 )   PT: 13.1 sec;   INR: 1.11 ratio         PTT - ( 02 Dec 2020 06:30 )  PTT:28.1 sec    CAPILLARY BLOOD GLUCOSE      POCT Blood Glucose.: 257 mg/dL (03 Dec 2020 12:30)  POCT Blood Glucose.: 156 mg/dL (03 Dec 2020 05:46)  POCT Blood Glucose.: 181 mg/dL (02 Dec 2020 21:01)  POCT Blood Glucose.: 162 mg/dL (02 Dec 2020 17:40)

## 2020-12-03 NOTE — PROGRESS NOTE ADULT - REASON FOR ADMISSION
pancreatitis

## 2020-12-03 NOTE — PROGRESS NOTE ADULT - SUBJECTIVE AND OBJECTIVE BOX
INTERVAL HPI/OVERNIGHT EVENTS:  Pt stable.   Tolerating diet.   flatus, no BM.  Minimal abdominal pain.    Vital Signs Last 24 Hrs  T(C): 36.8 (03 Dec 2020 06:05), Max: 36.8 (03 Dec 2020 06:05)  T(F): 98.3 (03 Dec 2020 06:05), Max: 98.3 (03 Dec 2020 06:05)  HR: 66 (03 Dec 2020 06:05) (65 - 81)  BP: 137/62 (03 Dec 2020 06:05) (137/62 - 158/68)  BP(mean): 84 (02 Dec 2020 19:40) (84 - 93)  RR: 18 (03 Dec 2020 06:05) (12 - 20)  SpO2: 99% (03 Dec 2020 06:05) (94% - 99%)    Physical:  Abdomen: Soft nondistended, nontender.  Port sites clean.    I&O's Summary    02 Dec 2020 07:01  -  03 Dec 2020 07:00  --------------------------------------------------------  IN: 700 mL / OUT: 0 mL / NET: 700 mL        LABS:                        13.1   9.60  )-----------( 312      ( 03 Dec 2020 07:51 )             42.1             12-03    136  |  98  |  8   ----------------------------<  164<H>  3.9   |  27  |  0.52    Ca    9.0      03 Dec 2020 07:51  Phos  3.5     12-02  Mg     2.0     12-02

## 2020-12-03 NOTE — PROGRESS NOTE ADULT - ASSESSMENT
This is a 59-year-old Tajik speaking male, who lives at home with his wife, walking independently, with history of HTN, DM (A1C: 9.2), NIKKI, and L hydrocelectomy (7/21/2020) presents with severe abdominal pain for 1 day with elevated lipase secondary to possible drug induced from Victoza vs. passed gallbladder stone (US shows gall bladder sludge) s/p laparoscopic cholecystectomy (POD 1).

## 2020-12-03 NOTE — PROGRESS NOTE ADULT - PROBLEM SELECTOR PROBLEM 3
Leukocytosis
Diabetes mellitus
Hyperlipidemia
Diabetes mellitus

## 2020-12-03 NOTE — PROGRESS NOTE ADULT - PROBLEM SELECTOR PROBLEM 4
Renal cyst
Diabetes mellitus
Hypertension
Leukocytosis
Leukocytosis

## 2020-12-03 NOTE — PROGRESS NOTE ADULT - ASSESSMENT
58 y/o male admitted with pancreatitis POD #1 lap brant.   -OOB  -Advance diet   -Pain control PRN   -DVT ppx   -D/c planning

## 2020-12-03 NOTE — PROGRESS NOTE ADULT - PROBLEM SELECTOR PROBLEM 2
Hypertension
Leukocytosis
NIKKI (obstructive sleep apnea)
Hypertension
Hypertension
Leukocytosis

## 2020-12-03 NOTE — PROGRESS NOTE ADULT - SUBJECTIVE AND OBJECTIVE BOX
INTERVAL HPI/OVERNIGHT EVENTS:  Pt seen and examined at bedside  Pt resting comfortably. No acute complaints  Denies N/V  Voiding     MEDICATIONS  (STANDING):  atorvastatin 20 milliGRAM(s) Oral at bedtime  dextrose 40% Gel 15 Gram(s) Oral once  dextrose 40% Gel 15 Gram(s) Oral once  dextrose 5%. 1000 milliLiter(s) (50 mL/Hr) IV Continuous <Continuous>  dextrose 5%. 1000 milliLiter(s) (100 mL/Hr) IV Continuous <Continuous>  dextrose 50% Injectable 25 Gram(s) IV Push once  dextrose 50% Injectable 12.5 Gram(s) IV Push once  dextrose 50% Injectable 25 Gram(s) IV Push once  enalapril 20 milliGRAM(s) Oral daily  enoxaparin Injectable 40 milliGRAM(s) SubCutaneous daily  gabapentin 100 milliGRAM(s) Oral three times a day  glucagon  Injectable 1 milliGRAM(s) IntraMuscular once  glucagon  Injectable 1 milliGRAM(s) IntraMuscular once  hydrALAZINE 25 milliGRAM(s) Oral every 8 hours  influenza   Vaccine 0.5 milliLiter(s) IntraMuscular once  insulin lispro (ADMELOG) corrective regimen sliding scale   SubCutaneous every 6 hours  pantoprazole  Injectable 40 milliGRAM(s) IV Push daily  polyethylene glycol 3350 17 Gram(s) Oral daily  senna 2 Tablet(s) Oral at bedtime  sodium chloride 0.45%. 1000 milliLiter(s) (100 mL/Hr) IV Continuous <Continuous>    MEDICATIONS  (PRN):  acetaminophen   Tablet .. 650 milliGRAM(s) Oral every 6 hours PRN Temp greater or equal to 38C (100.4F), Mild Pain (1 - 3)  morphine  - Injectable 4 milliGRAM(s) IV Push every 4 hours PRN Severe Pain (7 - 10)  ondansetron Injectable 4 milliGRAM(s) IV Push every 4 hours PRN Nausea and/or Vomiting  oxycodone    5 mG/acetaminophen 325 mG 1 Tablet(s) Oral every 4 hours PRN Moderate Pain (4 - 6)      Vital Signs Last 24 Hrs  T(C): 36.8 (03 Dec 2020 06:05), Max: 36.8 (03 Dec 2020 06:05)  T(F): 98.3 (03 Dec 2020 06:05), Max: 98.3 (03 Dec 2020 06:05)  HR: 66 (03 Dec 2020 06:05) (65 - 81)  BP: 137/62 (03 Dec 2020 06:05) (137/62 - 158/68)  BP(mean): 84 (02 Dec 2020 19:40) (84 - 93)  RR: 18 (03 Dec 2020 06:05) (12 - 20)  SpO2: 99% (03 Dec 2020 06:05) (94% - 99%)    Physical:  General: A&Ox3. NAD.  Respirations: Unlabored  Abdomen: Soft nondistended, nontender. Dressings C/D/I, appropriate incisional tenderness   Extremities: No calf tenderness/ edema     I&O's Detail    02 Dec 2020 07:01  -  03 Dec 2020 07:00  --------------------------------------------------------  IN:    Lactated Ringers Bolus: 700 mL  Total IN: 700 mL    OUT:  Total OUT: 0 mL    Total NET: 700 mL      LABS:                        13.7   10.17 )-----------( 310      ( 02 Dec 2020 06:30 )             43.8             12-02    137  |  99  |  8   ----------------------------<  169<H>  4.2   |  27  |  0.53    Ca    9.1      02 Dec 2020 06:30  Phos  3.5     12-02  Mg     2.0     12-02

## 2020-12-03 NOTE — PROGRESS NOTE ADULT - PROBLEM SELECTOR PLAN 5
CT Abdomen/Pelvis with IV contrast identified Right renal cysts as well as too small to characterize bilateral hypodensities.  May need renal ultrasound outpatient.   Nephrology following outpatient.

## 2020-12-03 NOTE — PROGRESS NOTE ADULT - PROBLEM SELECTOR PROBLEM 6
Diabetes mellitus
Prophylactic measure
Renal cyst

## 2020-12-03 NOTE — PROGRESS NOTE ADULT - PROBLEM SELECTOR PLAN 2
Blood pressure tolerating well with home medications (enalapril 20 mg daily and hydralazine 25 mg Q8H).   Continue with enalapril 20 mg daily and hydralazine 25 mg Q8H with holding parameters.   Monitor vital signs.  Echo showing Grossly normal LV function, EF > 55%, Trace AR, Trace MR.

## 2020-12-07 LAB — SURGICAL PATHOLOGY STUDY: SIGNIFICANT CHANGE UP

## 2020-12-08 PROBLEM — K81.9 CHOLECYSTITIS: Status: ACTIVE | Noted: 2020-12-08

## 2020-12-08 PROBLEM — Z78.9 OTHER SPECIFIED HEALTH STATUS: Chronic | Status: ACTIVE | Noted: 2020-11-30

## 2020-12-08 PROBLEM — Z87.891 PERSONAL HISTORY OF NICOTINE DEPENDENCE: Chronic | Status: ACTIVE | Noted: 2020-11-30

## 2020-12-10 LAB — GLUCOSE BLDC GLUCOMTR-MCNC: 159 MG/DL — HIGH (ref 70–99)

## 2020-12-14 ENCOUNTER — APPOINTMENT (OUTPATIENT)
Dept: SURGERY | Facility: CLINIC | Age: 60
End: 2020-12-14
Payer: COMMERCIAL

## 2020-12-14 VITALS
BODY MASS INDEX: 22.79 KG/M2 | WEIGHT: 197 LBS | SYSTOLIC BLOOD PRESSURE: 145 MMHG | DIASTOLIC BLOOD PRESSURE: 73 MMHG | HEIGHT: 78 IN | HEART RATE: 71 BPM | OXYGEN SATURATION: 99 % | TEMPERATURE: 96.7 F

## 2020-12-14 DIAGNOSIS — E78.00 PURE HYPERCHOLESTEROLEMIA, UNSPECIFIED: ICD-10-CM

## 2020-12-14 DIAGNOSIS — E11.9 TYPE 2 DIABETES MELLITUS W/OUT COMPLICATIONS: ICD-10-CM

## 2020-12-14 DIAGNOSIS — Z78.9 OTHER SPECIFIED HEALTH STATUS: ICD-10-CM

## 2020-12-14 DIAGNOSIS — K81.9 CHOLECYSTITIS, UNSPECIFIED: ICD-10-CM

## 2020-12-14 PROCEDURE — 99024 POSTOP FOLLOW-UP VISIT: CPT

## 2020-12-14 RX ORDER — LISINOPRIL 30 MG/1
TABLET ORAL
Refills: 0 | Status: ACTIVE | COMMUNITY

## 2020-12-14 NOTE — PHYSICAL EXAM
[Alert] : alert [Oriented to Person] : oriented to person [Oriented to Place] : oriented to place [Oriented to Time] : oriented to time [Calm] : calm [de-identified] : He  is alert, well-groomed, and cheerful.\par   [de-identified] : anicteric.  Nasal mucosa pink, septum midline. Oral mucosa pink.  Tongue midline, Pharynx without exudates.\par   [de-identified] : Neck supple. Trachea midline. Thyroid isthmus barely palpable, lobes not felt.\par

## 2020-12-14 NOTE — DATA REVIEWED
[FreeTextEntry1] : ELIESER LOPEZ                    2\par \par \par \par Surgical Final Report\par \par \par \par \par Final Diagnosis\par \par Gallbladder; laparoscopic cholecystectomy:\par - Chronic calculous cholecystitis.\par \par Verified by: Mally Brewster MD\par (Electronic Signature)\par Reported on: 12/07/20 10:36 EST, [facility],\par [address], [place] [address]\par Phone: [phone]   Fax: [phone]\par _________________________________________________________________\par \par Specimen(s) Submitted\par Gallbladder\par

## 2020-12-14 NOTE — ASSESSMENT
[FreeTextEntry1] : Mr. LOPEZ is doing well, with excellent post-operative recovery. All surgical incisions are healing well and as expected. There is no evidence of infection or complication, and he is progressing as expected. Post-operative wound care, activity, restrictions and precautions reinforced. Patient instructed to refrain from any heavy lifting greater than 10-15 pounds for at least 4 weeks post-operatively. pathology results were discussed in details.  Patient's questions and concerns addressed to patient's satisfaction.

## 2020-12-14 NOTE — PLAN
[FreeTextEntry1] : Mr. LOPEZ will follow up  if needed. Warning signs, follow up, and restrictions were discussed with the patient. \par \par follow up with PMD and GI for weight loss \par

## 2020-12-14 NOTE — HISTORY OF PRESENT ILLNESS
[de-identified] : Mr. LOPEZ  is s/p laparoscopic  cholecystectomy   on 12/02/2020. Today Mr. LOPEZ offers no complaints. patient reports no fever, chills,  or  pain. His  surgical wounds are  healing well. No signs of inflammation, infection or exudate. Patient reports good bowel. He reports decrease in his  appetite.

## 2021-04-18 ENCOUNTER — TRANSCRIPTION ENCOUNTER (OUTPATIENT)
Age: 61
End: 2021-04-18

## 2021-11-14 NOTE — PROGRESS NOTE ADULT - PROBLEM SELECTOR PROBLEM 9
[facility]  Emergency Department  Emergency Medicine Resident Sign-out     Care of Jese Welsh was assumed from Dr. David Philip Gastritis

## 2022-12-23 NOTE — DIETITIAN INITIAL EVALUATION ADULT. - PERTINENT LABORATORY DATA
11-27 Na137 mmol/L Glu 103 mg/dL<H> K+ 3.8 mmol/L Cr  0.51 mg/dL BUN 14 mg/dL 11-25 Phos 4.0 mg/dL 11-27 Alb 2.4 g/dL<L> 11-25 Chol 139 mg/dL LDL --    HDL 48 mg/dL Trig 71 mg/dL
Calm

## 2023-07-27 NOTE — H&P ADULT - PROBLEM SELECTOR PROBLEM 5
Anesthesia Evaluation     Patient summary reviewed and Nursing notes reviewed   history of anesthetic complications (Hx of spinal headache years ago):   NPO Solid Status: > 8 hours  NPO Liquid Status: > 8 hours           Airway   Mallampati: II  TM distance: >3 FB  No difficulty expected  Dental - normal exam     Pulmonary - normal exam    breath sounds clear to auscultation  (+) asthma (Tests indicate slight asthma; patient denies and does not take meds.),    ROS comment: Blood clots in lungs (lower lobe of right lung) due to possible tick bite (hospital stay of 34 days [facility]) 2021.  Cardiovascular - normal exam  Exercise tolerance: good (4-7 METS)    ECG reviewed  Rhythm: regular  Rate: normal      ROS comment: 8/22/22 ECG    HEART RATE= 64  bpm  RR Interval= 932  ms  NH Interval= 142  ms  P Horizontal Axis= -9  deg  P Front Axis= 59  deg  QRSD Interval= 87  ms  QT Interval= 361  ms  QRS Axis= -43  deg  T Wave Axis= 22  deg  - ABNORMAL ECG -  Sinus rhythm  Ventricular premature complex  Probable left atrial enlargement  Left anterior fascicular block  Low voltage, precordial leads  No change from prior tracing      Neuro/Psych  (+) headaches  GI/Hepatic/Renal/Endo    (+) thyroid problem hypothyroidism    ROS Comment: Developed C-diff while in hospital in 2021.    Musculoskeletal         ROS comment: Hx of left knee replacement  Abdominal  - normal exam   Substance History - negative use     OB/GYN negative ob/gyn ROS     Comment: Hx of Vaginismus  Vaginal atrophy, and spinal headache          Other   arthritis (Primary osteoarthritis of left knee),       Other Comment: Tick bit in 2021; hospitalized at [facility] for 34 days; diagnosed with ehrlichiosis; developed C-diff during hospital stay.                  Anesthesia Plan    ASA 2     MAC     intravenous induction     Anesthetic plan, risks, benefits, and alternatives have been provided, discussed and informed consent has been obtained with: patient and  spouse/significant other.    Use of blood products discussed with patient and spouse/significant other .    Plan discussed with CRNA.      CODE STATUS:          Hyperlipidemia

## 2024-05-29 NOTE — PROGRESS NOTE ADULT - PROBLEM/PLAN-8
1.  Persistent atrial fibrillation: This patient has longstanding persistent AF and this is likely permanent since he has been in atrial fibrillation since January 2020.  The ventricular response is controlled.  I would not recommend rhythm control strategies as this will likely be unsuccessful given the duration of this arrhythmia.    2.  Chronic diastolic CHF: The patient will temporarily increase the torsemide to 60 mg daily for the next 4 days and monitor his weight closely.  He has had a slightly elevated creatinine level but he still has significant peripheral edema.  We discussed the dosing instructions for torsemide and he will continue to monitor his weight on a daily basis.  An echocardiogram will be obtained.    3.  Sick sinus syndrome: The pacemaker function has been normal and the estimated battery longevity is 13 months.  Continue follow-up through the cardiac device clinic.      
DISPLAY PLAN FREE TEXT

## 2025-02-11 ENCOUNTER — OUTPATIENT (OUTPATIENT)
Dept: OUTPATIENT SERVICES | Facility: HOSPITAL | Age: 65
LOS: 1 days | End: 2025-02-11
Payer: MEDICARE

## 2025-02-11 VITALS
TEMPERATURE: 96 F | SYSTOLIC BLOOD PRESSURE: 176 MMHG | OXYGEN SATURATION: 100 % | DIASTOLIC BLOOD PRESSURE: 66 MMHG | HEART RATE: 62 BPM | HEIGHT: 67 IN | WEIGHT: 210.1 LBS

## 2025-02-11 DIAGNOSIS — Z90.49 ACQUIRED ABSENCE OF OTHER SPECIFIED PARTS OF DIGESTIVE TRACT: Chronic | ICD-10-CM

## 2025-02-11 DIAGNOSIS — E78.5 HYPERLIPIDEMIA, UNSPECIFIED: ICD-10-CM

## 2025-02-11 DIAGNOSIS — G47.33 OBSTRUCTIVE SLEEP APNEA (ADULT) (PEDIATRIC): ICD-10-CM

## 2025-02-11 DIAGNOSIS — N43.3 HYDROCELE, UNSPECIFIED: ICD-10-CM

## 2025-02-11 DIAGNOSIS — E11.9 TYPE 2 DIABETES MELLITUS WITHOUT COMPLICATIONS: ICD-10-CM

## 2025-02-11 DIAGNOSIS — Z01.818 ENCOUNTER FOR OTHER PREPROCEDURAL EXAMINATION: ICD-10-CM

## 2025-02-11 DIAGNOSIS — I10 ESSENTIAL (PRIMARY) HYPERTENSION: ICD-10-CM

## 2025-02-11 DIAGNOSIS — I50.9 HEART FAILURE, UNSPECIFIED: ICD-10-CM

## 2025-02-11 DIAGNOSIS — R06.09 OTHER FORMS OF DYSPNEA: ICD-10-CM

## 2025-02-11 NOTE — H&P PST ADULT - PROBLEM SELECTOR PLAN 2
Patient instructed to continue antihypertensive medication as prescribed and take the morning of surgery with just a sips of water.

## 2025-02-11 NOTE — H&P PST ADULT - NEGATIVE CARDIOVASCULAR SYMPTOMS
no chest pain/no palpitations/no dyspnea on exertion/no paroxysmal nocturnal dyspnea/no peripheral edema no chest pain/no palpitations/no paroxysmal nocturnal dyspnea/no peripheral edema

## 2025-02-11 NOTE — H&P PST ADULT - PROBLEM SELECTOR PLAN 5
Patient with hx of NIKKI on CPAP (non complaint)   Perioperative NIKKI precautions to be maintained.

## 2025-02-11 NOTE — H&P PST ADULT - NSICDXPASTMEDICALHX_GEN_ALL_CORE_FT
PAST MEDICAL HISTORY:  Diabetes mellitus     Former smoker, stopped smoking in distant past     Gastritis     Hyperlipidemia     Hypertension     Language barrier     NIKKI (obstructive sleep apnea)

## 2025-02-11 NOTE — H&P PST ADULT - PROBLEM SELECTOR PLAN 3
Current treatment regimen for diabetes - Ozempic 2mg weekly. Instructed to take last dose of Ozempic on 2/16/25.  Hold Metformin and Glyburide the morning of surgery.    Hold Jardiance 3 days before surgery.   Last Hgb A1C not known, will obtain from PCP  Fingerstick STAT AM day of surgery ordered  Follow up with PCP/Endocrinologist postoperatively.

## 2025-02-11 NOTE — H&P PST ADULT - PROBLEM SELECTOR PLAN 1
Patient scheduled for right hydrocelectomy on 2/25/25. Preop instructions given both verbal and written,  instructed to be NPO the night before and the morning of surgery. Provided with chlorhexidine 4% solution to wash for 3 days including the morning of surgery, bottle provided .Instructed to avoid aspirin and over the counter medications including vitamins and herbal medications one week before surgery. Patient verbalized understanding. Escort required  Patient is to expect a phone call day before surgery between 430-630pm, giving arrival time for surgery day.    Patient scheduled to see his PCP tomorrow for medical optimization. Medical optimization pending, report to be obtained.

## 2025-02-11 NOTE — H&P PST ADULT - NEGATIVE GENERAL GENITOURINARY SYMPTOMS
no hematuria/no renal colic/no flank pain L no hematuria/no renal colic/no flank pain L/no flank pain R

## 2025-02-11 NOTE — H&P PST ADULT - HISTORY OF PRESENT ILLNESS
64 year old male with pmhx of Diabetes Mellitus Type 2, gastritis, hypertension, NIKKI(non compliant) and hyperlipidemia communicated to via telephone with assist of [place]  services ID#[id number] Cristobal for purpose of preoperative assessment/instructions for schedule left hydrocelectomy on 7/21/2020 64 years old male with Pmhx of Diabetes Mellitus Type 2, gastritis, hypertension, NIKKI(non compliant) and hyperlipidemia and PSH of left hydrocelectomy on 7/21/2020 presents to PST today for presurgical evaluation. patient with hydrocele and is now  scheduled for right hydrocelectomy on 2/25/25. Patient scheduled to see his PCP tomorrow for medical optimization.

## 2025-02-11 NOTE — H&P PST ADULT - ASSESSMENT
59year old male with left hydrocele 64 years old male with Pmhx of Diabetes Mellitus Type 2, gastritis, hypertension, NIKKI(non compliant) and hyperlipidemia and PSH of left hydrocelectomy on 2020 presents to PST today for presurgical evaluation. patient with hydrocele and is now  scheduled for right hydrocelectomy on 25. Patient scheduled to see his PCP tomorrow for medical optimization.   CAPRINI SCORE [CLOT]Total Score [    2      ]    AGE RELATED RISK FACTORS                                                       MOBILITY RELATED FACTORS  [ ] Age 41-60 years                                            (1 Point)                  [ ] Bed rest                                                        (1 Point)  [x ] Age: 61-74 years                                           (2 Points)                 [ ] Plaster cast                                                   (2 Points)  [ ] Age= 75 years                                              (3 Points)                 [ ] Bed bound for more than 72 hours                 (2 Points)    DISEASE RELATED RISK FACTORS                                               GENDER SPECIFIC FACTORS  [ ] Edema in the lower extremities                       (1 Point)                  [ ] Pregnancy                                                     (1 Point)  [ ] Varicose veins                                               (1 Point)                  [ ] Post-partum < 6 weeks                                   (1 Point)             [ ] BMI > 25 Kg/m2                                            (1 Point)                  [ ] Hormonal therapy  or oral contraception          (1 Point)                 [ ] Sepsis (in the previous month)                        (1 Point)                  [ ] History of pregnancy complications                 (1 point)  [ ] Pneumonia or serious lung disease                                               [ ] Unexplained or recurrent                     (1 Point)           (in the previous month)                               (1 Point)  [ ] Abnormal pulmonary function test                     (1 Point)                 SURGERY RELATED RISK FACTORS  [ ] Acute myocardial infarction                              (1 Point)                 [ ]  Section                                             (1 Point)  [ ] Congestive heart failure (in the previous month)  (1 Point)               [ ] Minor surgery                                                  (1 Point)   [ ] Inflammatory bowel disease                             (1 Point)                 [ ] Arthroscopic surgery                                        (2 Points)  [ ] Central venous access                                      (2 Points)                [ ] General surgery lasting more than 45 minutes   (2 Points)       [ ] Stroke (in the previous month)                          (5 Points)               [ ] Elective arthroplasty                                         (5 Points)                                                                                                                                               HEMATOLOGY RELATED FACTORS                                                 TRAUMA RELATED RISK FACTORS  [ ] Prior episodes of VTE                                     (3 Points)                [ ] Fracture of the hip, pelvis, or leg                       (5 Points)  [ ] Positive family history for VTE                         (3 Points)                 [ ] Acute spinal cord injury (in the previous month)  (5 Points)  [ ] Prothrombin 89281 A                                     (3 Points)                 [ ] Paralysis  (less than 1 month)                             (5 Points)  [ ] Factor V Leiden                                             (3 Points)                  [ ] Multiple Trauma within 1 month                        (5 Points)  [ ] Lupus anticoagulants                                     (3 Points)                                                           [ ] Anticardiolipin antibodies                               (3 Points)                                                       [ ] High homocysteine in the blood                      (3 Points)                                             [ ] Other congenital or acquired thrombophilia      (3 Points)                                                [ ] Heparin induced thrombocytopenia                  (3 Points)                                          Total Score [    2      ]    Caprini Score 0 - 2:  Low Risk, No VTE Prophylaxis required for most patients, encourage ambulation  Caprini Score 3 - 6:  At Risk, pharmacologic VTE prophylaxis is indicated for most patients (in the absence of a contraindication)  Caprini Score Greater than or = 7:  High Risk, pharmacologic VTE prophylaxis is indicated for most patients (in the absence of a contraindication)

## 2025-02-12 PROCEDURE — G0463: CPT

## 2025-02-25 ENCOUNTER — OUTPATIENT (OUTPATIENT)
Dept: OUTPATIENT SERVICES | Facility: HOSPITAL | Age: 65
LOS: 1 days | End: 2025-02-25
Payer: MEDICARE

## 2025-02-25 ENCOUNTER — TRANSCRIPTION ENCOUNTER (OUTPATIENT)
Age: 65
End: 2025-02-25

## 2025-02-25 VITALS
HEART RATE: 58 BPM | DIASTOLIC BLOOD PRESSURE: 69 MMHG | OXYGEN SATURATION: 97 % | RESPIRATION RATE: 16 BRPM | SYSTOLIC BLOOD PRESSURE: 156 MMHG | TEMPERATURE: 98 F

## 2025-02-25 VITALS
OXYGEN SATURATION: 96 % | DIASTOLIC BLOOD PRESSURE: 66 MMHG | SYSTOLIC BLOOD PRESSURE: 137 MMHG | HEIGHT: 67 IN | TEMPERATURE: 98 F | RESPIRATION RATE: 16 BRPM | WEIGHT: 210.1 LBS | HEART RATE: 61 BPM

## 2025-02-25 DIAGNOSIS — Z90.49 ACQUIRED ABSENCE OF OTHER SPECIFIED PARTS OF DIGESTIVE TRACT: Chronic | ICD-10-CM

## 2025-02-25 DIAGNOSIS — N43.3 HYDROCELE, UNSPECIFIED: ICD-10-CM

## 2025-02-25 LAB
GLUCOSE BLDC GLUCOMTR-MCNC: 230 MG/DL — HIGH (ref 70–99)
GLUCOSE BLDC GLUCOMTR-MCNC: 247 MG/DL — HIGH (ref 70–99)

## 2025-02-25 PROCEDURE — 88302 TISSUE EXAM BY PATHOLOGIST: CPT

## 2025-02-25 PROCEDURE — 55040 REMOVAL OF HYDROCELE: CPT | Mod: RT

## 2025-02-25 PROCEDURE — 82962 GLUCOSE BLOOD TEST: CPT

## 2025-02-25 PROCEDURE — 88302 TISSUE EXAM BY PATHOLOGIST: CPT | Mod: 26

## 2025-02-25 RX ORDER — SEMAGLUTIDE 0.25 MG/.5ML
1 INJECTION, SOLUTION SUBCUTANEOUS
Refills: 0 | DISCHARGE

## 2025-02-25 RX ORDER — SODIUM CHLORIDE 9 G/1000ML
1000 INJECTION, SOLUTION INTRAVENOUS
Refills: 0 | Status: DISCONTINUED | OUTPATIENT
Start: 2025-02-25 | End: 2025-02-25

## 2025-02-25 RX ORDER — EMPAGLIFLOZIN 10 MG/1
1 TABLET, FILM COATED ORAL
Refills: 0 | DISCHARGE

## 2025-02-25 RX ORDER — HYDROMORPHONE/SOD CHLOR,ISO/PF 2 MG/10 ML
1 SYRINGE (ML) INJECTION
Refills: 0 | Status: DISCONTINUED | OUTPATIENT
Start: 2025-02-25 | End: 2025-02-25

## 2025-02-25 RX ORDER — GLIMEPIRIDE 4 MG/1
1 TABLET ORAL
Refills: 0 | DISCHARGE

## 2025-02-25 RX ORDER — FENOFIBRATE 145 MG/1
1 TABLET ORAL
Refills: 0 | DISCHARGE

## 2025-02-25 RX ORDER — PIOGLITAZONE HYDROCHLORIDE 15 MG/1
1 TABLET ORAL
Refills: 0 | DISCHARGE

## 2025-02-25 RX ORDER — ONDANSETRON HCL/PF 4 MG/2 ML
4 VIAL (ML) INJECTION ONCE
Refills: 0 | Status: DISCONTINUED | OUTPATIENT
Start: 2025-02-25 | End: 2025-02-25

## 2025-02-25 RX ORDER — FENTANYL CITRATE-0.9 % NACL/PF 100MCG/2ML
25 SYRINGE (ML) INTRAVENOUS
Refills: 0 | Status: DISCONTINUED | OUTPATIENT
Start: 2025-02-25 | End: 2025-02-25

## 2025-02-25 RX ORDER — OXYCODONE HYDROCHLORIDE 30 MG/1
5 TABLET ORAL ONCE
Refills: 0 | Status: DISCONTINUED | OUTPATIENT
Start: 2025-02-25 | End: 2025-02-25

## 2025-02-25 RX ORDER — METFORMIN HYDROCHLORIDE 1000 MG/1
1 TABLET, COATED ORAL
Refills: 0 | DISCHARGE

## 2025-02-25 RX ORDER — ACETAMINOPHEN 500 MG/5ML
650 LIQUID (ML) ORAL EVERY 6 HOURS
Refills: 0 | Status: DISCONTINUED | OUTPATIENT
Start: 2025-02-25 | End: 2025-03-11

## 2025-02-25 RX ADMIN — Medication 25 MICROGRAM(S): at 10:32

## 2025-02-25 RX ADMIN — Medication 25 MICROGRAM(S): at 10:17

## 2025-02-25 NOTE — ASU DISCHARGE PLAN (ADULT/PEDIATRIC) - FINANCIAL ASSISTANCE
[facility] provides services at a reduced cost to those who are determined to be eligible through [facility]’s financial assistance program. Information regarding [facility]’s financial assistance program can be found by going to [email or web address] or by calling [phone].

## 2025-02-25 NOTE — ASU DISCHARGE PLAN (ADULT/PEDIATRIC) - CARE PROVIDER_API CALL
Stiven Mcduffie  Urology  [address]  [place] [address]  Phone: [phone]  Fax: [phone]  Follow Up Time: 1 week

## 2025-02-25 NOTE — ASU PATIENT PROFILE, ADULT - VISION (WITH CORRECTIVE LENSES IF THE PATIENT USUALLY WEARS THEM):
Patient informed.    reading/Partially impaired: cannot see medication labels or newsprint, but can see obstacles in path, and the surrounding layout; can count fingers at arm's length

## 2025-02-25 NOTE — ASU PREOP CHECKLIST - LATEX ALLERGY
[place] ambulatory encounter  ORTHOPEDIC HISTORY AND PHYSICAL      CHIEF COMPLAINT:  Knee (Left knee pain )       HISTORY OF PRESENT ILLNESS:    See dictation below.    Medications, tobacco use, and allergies verified by nursing.    PAST MEDICAL HISTORY:    Past Medical History:   Diagnosis Date   • Arthritis    • Gastroesophageal reflux disease    • HTN (hypertension)    • Sleep apnea     does not tolerate CPAP       SURGICAL HISTORY:    Past Surgical History:   Procedure Laterality Date   • Colonoscopy diagnostic  11/14/13    Affi 5yr recall,sigmoid polyp 2018   • Colonoscopy w biopsy  9/12/2008   • Hemorhoidectomy int ext single column group  2012   • Knee surgery     • Repair of rectum,prolapse mucosa  04/18/2017    [facility]  PROCTOPLASTY FOR HEMORRHOIDS   • Testicle surgery      GROWTH BIOPSY DONE, CALCIUM DEPOSIT       FAMILY HISTORY:    Family History   Problem Relation Age of Onset   • Diabetes Father    • Hypertension Father    • Hypertension Mother    • Cancer Maternal Grandmother        SOCIAL HISTORY:    Social History     Tobacco Use   • Smoking status: Former Smoker     Packs/day: 0.00     Types: Cigars   • Smokeless tobacco: Never Used   • Tobacco comment: only socially   Substance Use Topics   • Alcohol use: Yes     Alcohol/week: 6.0 - 7.0 standard drinks     Types: 6 - 7 Standard drinks or equivalent per week     Comment: drink on weekends   • Drug use: Not Currently     Types: Marijuana     Comment: NONE SINCE AGE EARLY 40'S       MEDICATIONS:    Current Outpatient Medications   Medication Sig Dispense Refill   • oxyCODONE-acetaminophen (PERCOCET)  MG per tablet Take 1 tablet by mouth 3 times daily as needed for pain. 90 tablet 0   • testosterone 12.5 MG/ACT (1%) gel pump Apply 4 pumps daily to your shoulder 75 g 1   • spironolactone (Aldactone) 100 MG tablet Take 1 tablet by mouth daily. 90 tablet 1   • amLODIPine (NORVASC) 10 MG tablet Take 1 tablet by mouth at bedtime. 90 tablet 1   •  carvedilol (Coreg) 12.5 MG tablet Take 1 tablet by mouth 2 times daily. 90 tablet 1   • losartan (Cozaar) 100 MG tablet Take 1 tablet by mouth 2 times daily. 90 tablet 1   • atorvastatin (Lipitor) 20 MG tablet Take 1 tablet by mouth at bedtime. 90 tablet 1   • losartan (COZAAR) 100 MG tablet Take 1 tablet by mouth daily 90 tablet 0   • fluticasone (Flonase Sensimist) 27.5 MCG/SPRAY nasal spray Two sprays per nostril daily 9.1 mL 2   • azithromycin (Zithromax Z-Derek) 250 MG tablet Take two tablets by mouth on day 1 then take one tablet  daily on days 2-5 6 tablet 0   • carvedilol (COREG) 25 MG tablet Take 1 tablet by mouth twice a day. 90 tablet 1   • tamsulosin (FLOMAX) 0.4 MG Cap Take 1 capsule by mouth daily. 30 capsule 5   • sildenafil (VIAGRA) 50 MG tablet Take a-HALF to one tablet by mouth daily as needed one hour before sex 5 tablet 1   • potassium CHLORIDE (KLOR-CON) 20 MEQ packet Take 1 packet by mouth daily. (mix with liquid) 90 each 1   • fluticasone (FLONASE) 50 MCG/ACT nasal spray      • penicillin v potassium (VEETID) 500 MG tablet Take 1 tablet by mouth every 6 hours. 28 tablet 0   • aspirin 81 MG EC tablet Take 1 tablet by mouth daily. 90 tablet 1   • zolpidem (AMBIEN) 5 MG tablet Take 1 tablet by mouth nightly as needed for Sleep (for in lab sleep study). 1 tablet 0   • erythromycin (ILOTYCIN) ophthalmic ointment Place 0.25 inches into right eye nightly. 3.5 g 0   • naloxone (NARCAN) 4 MG/0.1ML nasal spray Call 911. [place] the content of 1 device into 1 nostril. May repeat with 2nd device if no response in 2-3 minutes. 2 each 0   • aspirin 325 MG EC tablet Take 1 tablet by mouth daily for 14 days. 14 tablet 0   • sildenafil (VIAGRA) 50 MG tablet Take 1 tablet by mouth as needed for Erectile Dysfunction. 10 tablet 6   • lidocaine (XYLOCAINE) 5 % ointment Apply 4-6 inches topically to the affected area daily for 12 hours. 35.44 g 1   • amLODIPine (NORVASC) 10 MG tablet Take 1 tablet by mouth daily. 30  tablet 1     Current Facility-Administered Medications   Medication Dose Route Frequency Provider Last Rate Last Admin   • dilTIAZem (CARDIZEM) injection 15 mg  15 mg Intravenous Once Zaida Velez MD           ALLERGIES:    ALLERGIES:   Allergen Reactions   • Gabapentin HIVES       REVIEW OF SYSTEMS:    Complete 14-system review of systems was performed and negative except as documented in HPI and as per the following positive findings:  Urinary frequency    OBJECTIVE:  PHYSICAL EXAM-    Vitals:    Visit Vitals  Temp 97.8 °F (36.6 °C) (Temporal)   Ht 6' (1.829 m)   Wt (!) 140.2 kg (309 lb)   BMI 41.91 kg/m²      Constitutional:  Well-developed, well-nourished male in no acute distress.  Skin:  Warm, dry, intact without rash or lesion.  No subcutaneous masses.  HEENT:  Normocephalic.  Hearing intact.  Vision intact.  Psych:  Alert & oriented x3.  Mood and insight appropriate.  CV:  Pulse is regular.  No significant pitting edema or lymphedema.   Resp:  Respiratory effort within appropriate limits.    Abdomen:  No abnormal distension.  Neuro:  No gross sensory deficits.  Spine:  No gross curvature of spine.  Appropriate mobility without instability.  No gross pelvic obliquity.  Musculoskeletal:  See dictation below.    IMAGING STUDIES:  See dictation below.    ASSESSMENT:    1. Primary osteoarthritis of left knee    2. Left knee pain, unspecified chronicity    3. Primary osteoarthritis of right knee        PLAN:    · See dictation below.  No follow-ups on file.    Orders Placed This Encounter   • XR Knee 3 vw Left and AP Standing Bilat       Instructions provided as documented in the AVS.    The patient indicated understanding of the diagnosis and agreed with the plan of care.     DICTATION:   Tony Cloud is a 50 year old male who presents for evaluation of the left knee(s).  He reports pain that has gradually worsened for at least a month.  Pain is dull, achy and severe in nature.  Pain is worse with  activity - walking, stairs, kneeling, standing, getting up from a chair, etc.  He can only walk about 1 block due to the knee pain.  Pain does awaken him at night.  He does have popping and clicking.   He has tried multiple treatments, including NSAIDs, tylenol, oxycodone, and continues to have persistent debilitating symptoms.  He reports no history of previous injuries or surgeries.    LEFT KNEE(s):  Examination of the knee reveals skin is intact.  No rashes or lesions.  There is moderate effusion present.  Knee is TTP at Medial joint line and Patellofemoral joint line.  Knee is stable to varus and valgus stress.  Knee ROM is 0-115, and tolerated with mild pain.  Strength is intact and normal.  Patella tracks normally, and is appropriately mobile.  There is patellar crepitus with motion.  There is pain in the patellofemoral joint with patellar compression. Anterior drawer and posterior drawer tests are negative.  McMurrays test causes pain at the medial joint, and a pop.  There is no significant neurovascular deficit distally on one limb vs the other.    X-rays ordered today, independently evaluated by Dr. Zamorano and reviewed with the patient.    The patient has severe osteoarthritis of the right knee, and moderate osteoarthritis of the left knee.  In addition, he had acute onset pain in the left knee that could represent intraarticular injury such as a meniscus tear.  He has been able to manage the pain with non-surgical measures.  The patient expressed a desire to continue with non-surgical management.  We discussed treatment options including NSAIDs, steroid injections, glucosamine, walking assistive devices, maintaining optimal weight, and physical therapy.  We discussed the value of cortisone injection.  Risks and benefits were discussed, and the patient elected to proceed with injection.  Kenalog 40mg was injected into the left and right knee joints with no complications. We discussed that if left knee  injection is limited, then he will call us in 4-6 weeks to discuss getting a knee MRI.  He had similar pain in the right knee a few years ago and had knee scope for meniscus tear, which worked very well.  He reports this left knee pain feels very similar.    We will plan to see him back after knee MRI.         no

## 2025-03-04 LAB — SURGICAL PATHOLOGY STUDY: SIGNIFICANT CHANGE UP

## 2025-07-22 ENCOUNTER — APPOINTMENT (OUTPATIENT)
Dept: HEMATOLOGY ONCOLOGY | Facility: CLINIC | Age: 65
End: 2025-07-22

## (undated) DEVICE — SUT VICRYL 3-0 27" SH-1 UNDYED

## (undated) DEVICE — DRAIN PENROSE .25" X 18" LATEX

## (undated) DEVICE — WARMING BLANKET UPPER ADULT

## (undated) DEVICE — SUT CHROMIC 3-0 27" P-12

## (undated) DEVICE — ELCTR GROUNDING PAD ADULT COVIDIEN

## (undated) DEVICE — PACK MINOR NO DRAPE

## (undated) DEVICE — GLV 7.5 PROTEXIS (WHITE)

## (undated) DEVICE — SUSPENSORY WITH LEG STRAPS LARGE

## (undated) DEVICE — VENODYNE/SCD SLEEVE CALF MEDIUM

## (undated) DEVICE — SUT CHROMIC 4-0 27" SH

## (undated) DEVICE — SOL IRR POUR NS 0.9% 1500ML

## (undated) DEVICE — DRAIN PENROSE .5" X 18" LATEX

## (undated) DEVICE — DRSG CURITY GAUZE SPONGE 4 X 4" 12-PLY